# Patient Record
Sex: MALE | Race: WHITE | NOT HISPANIC OR LATINO | Employment: OTHER | URBAN - NONMETROPOLITAN AREA
[De-identification: names, ages, dates, MRNs, and addresses within clinical notes are randomized per-mention and may not be internally consistent; named-entity substitution may affect disease eponyms.]

---

## 2018-02-09 ENCOUNTER — HOSPITAL ENCOUNTER (EMERGENCY)
Facility: HOSPITAL | Age: 83
Discharge: HOME | End: 2018-02-09
Attending: FAMILY MEDICINE | Admitting: FAMILY MEDICINE

## 2018-02-09 VITALS
OXYGEN SATURATION: 99 % | BODY MASS INDEX: 21.22 KG/M2 | RESPIRATION RATE: 18 BRPM | TEMPERATURE: 98.2 F | HEART RATE: 93 BPM | WEIGHT: 140 LBS | SYSTOLIC BLOOD PRESSURE: 172 MMHG | HEIGHT: 68 IN | DIASTOLIC BLOOD PRESSURE: 89 MMHG

## 2018-02-09 DIAGNOSIS — I10 ESSENTIAL HYPERTENSION: Primary | ICD-10-CM

## 2018-02-09 DIAGNOSIS — Z91.14 NONCOMPLIANCE WITH MEDICATIONS: ICD-10-CM

## 2018-02-09 LAB
ALBUMIN SERPL-MCNC: 4.4 G/DL (ref 3.4–4.8)
ALBUMIN/GLOB SERPL: 1.4 G/DL (ref 1.5–2.5)
ALP SERPL-CCNC: 57 U/L (ref 40–129)
ALT SERPL W P-5'-P-CCNC: 21 U/L (ref 10–44)
ANION GAP SERPL CALCULATED.3IONS-SCNC: 9 MMOL/L (ref 3.6–11.2)
AST SERPL-CCNC: 25 U/L (ref 10–34)
BASOPHILS # BLD AUTO: 0.04 10*3/MM3 (ref 0–0.3)
BASOPHILS NFR BLD AUTO: 0.5 % (ref 0–2)
BILIRUB SERPL-MCNC: 0.9 MG/DL (ref 0.2–1.8)
BUN BLD-MCNC: 10 MG/DL (ref 7–21)
BUN/CREAT SERPL: 8.3 (ref 7–25)
CALCIUM SPEC-SCNC: 9.2 MG/DL (ref 7.7–10)
CHLORIDE SERPL-SCNC: 108 MMOL/L (ref 99–112)
CO2 SERPL-SCNC: 23 MMOL/L (ref 24.3–31.9)
CREAT BLD-MCNC: 1.21 MG/DL (ref 0.43–1.29)
DEPRECATED RDW RBC AUTO: 54.9 FL (ref 37–54)
EOSINOPHIL # BLD AUTO: 0.07 10*3/MM3 (ref 0–0.7)
EOSINOPHIL NFR BLD AUTO: 0.8 % (ref 0–7)
ERYTHROCYTE [DISTWIDTH] IN BLOOD BY AUTOMATED COUNT: 18.7 % (ref 11.5–14.5)
GFR SERPL CREATININE-BSD FRML MDRD: 57 ML/MIN/1.73
GLOBULIN UR ELPH-MCNC: 3.1 GM/DL
GLUCOSE BLD-MCNC: 138 MG/DL (ref 70–110)
HCT VFR BLD AUTO: 54.5 % (ref 42–52)
HGB BLD-MCNC: 18.1 G/DL (ref 14–18)
IMM GRANULOCYTES # BLD: 0.02 10*3/MM3 (ref 0–0.03)
IMM GRANULOCYTES NFR BLD: 0.2 % (ref 0–0.5)
LYMPHOCYTES # BLD AUTO: 3.06 10*3/MM3 (ref 1–3)
LYMPHOCYTES NFR BLD AUTO: 36.6 % (ref 16–46)
MCH RBC QN AUTO: 28.7 PG (ref 27–33)
MCHC RBC AUTO-ENTMCNC: 33.2 G/DL (ref 33–37)
MCV RBC AUTO: 86.4 FL (ref 80–94)
MONOCYTES # BLD AUTO: 0.84 10*3/MM3 (ref 0.1–0.9)
MONOCYTES NFR BLD AUTO: 10 % (ref 0–12)
NEUTROPHILS # BLD AUTO: 4.34 10*3/MM3 (ref 1.4–6.5)
NEUTROPHILS NFR BLD AUTO: 51.9 % (ref 40–75)
OSMOLALITY SERPL CALC.SUM OF ELEC: 280.6 MOSM/KG (ref 273–305)
PLATELET # BLD AUTO: 311 10*3/MM3 (ref 130–400)
PMV BLD AUTO: 10 FL (ref 6–10)
POTASSIUM BLD-SCNC: 3.5 MMOL/L (ref 3.5–5.3)
PROT SERPL-MCNC: 7.5 G/DL (ref 6–8)
RBC # BLD AUTO: 6.31 10*6/MM3 (ref 4.7–6.1)
SODIUM BLD-SCNC: 140 MMOL/L (ref 135–153)
WBC NRBC COR # BLD: 8.37 10*3/MM3 (ref 4.5–12.5)

## 2018-02-09 PROCEDURE — 99283 EMERGENCY DEPT VISIT LOW MDM: CPT

## 2018-02-09 PROCEDURE — 25010000002 HYDRALAZINE PER 20 MG: Performed by: FAMILY MEDICINE

## 2018-02-09 PROCEDURE — 80053 COMPREHEN METABOLIC PANEL: CPT | Performed by: FAMILY MEDICINE

## 2018-02-09 PROCEDURE — 85025 COMPLETE CBC W/AUTO DIFF WBC: CPT | Performed by: FAMILY MEDICINE

## 2018-02-09 PROCEDURE — 96374 THER/PROPH/DIAG INJ IV PUSH: CPT

## 2018-02-09 PROCEDURE — 93010 ELECTROCARDIOGRAM REPORT: CPT | Performed by: INTERNAL MEDICINE

## 2018-02-09 PROCEDURE — 36415 COLL VENOUS BLD VENIPUNCTURE: CPT

## 2018-02-09 PROCEDURE — 93005 ELECTROCARDIOGRAM TRACING: CPT | Performed by: FAMILY MEDICINE

## 2018-02-09 RX ORDER — CLONIDINE HYDROCHLORIDE 0.3 MG/1
0.3 TABLET ORAL 2 TIMES DAILY
Status: ON HOLD | COMMUNITY
End: 2019-11-10

## 2018-02-09 RX ORDER — TRIAMCINOLONE ACETONIDE 1 MG/G
CREAM TOPICAL 2 TIMES DAILY
Status: ON HOLD | COMMUNITY
End: 2019-11-10

## 2018-02-09 RX ORDER — HYDRALAZINE HYDROCHLORIDE 20 MG/ML
20 INJECTION INTRAMUSCULAR; INTRAVENOUS ONCE
Status: COMPLETED | OUTPATIENT
Start: 2018-02-09 | End: 2018-02-09

## 2018-02-09 RX ORDER — ENALAPRIL MALEATE 20 MG/1
20 TABLET ORAL DAILY
Status: ON HOLD | COMMUNITY
End: 2019-11-10

## 2018-02-09 RX ORDER — SODIUM CHLORIDE 0.9 % (FLUSH) 0.9 %
10 SYRINGE (ML) INJECTION AS NEEDED
Status: DISCONTINUED | OUTPATIENT
Start: 2018-02-09 | End: 2018-02-09 | Stop reason: HOSPADM

## 2018-02-09 RX ORDER — CLONIDINE HYDROCHLORIDE 0.1 MG/1
0.3 TABLET ORAL ONCE
Status: DISCONTINUED | OUTPATIENT
Start: 2018-02-09 | End: 2018-02-09

## 2018-02-09 RX ORDER — ATENOLOL 50 MG/1
50 TABLET ORAL DAILY
Status: ON HOLD | COMMUNITY
End: 2019-11-10

## 2018-02-09 RX ORDER — AMLODIPINE BESYLATE 5 MG/1
10 TABLET ORAL ONCE
Status: COMPLETED | OUTPATIENT
Start: 2018-02-09 | End: 2018-02-09

## 2018-02-09 RX ADMIN — HYDRALAZINE HYDROCHLORIDE 20 MG: 20 INJECTION INTRAMUSCULAR; INTRAVENOUS at 14:47

## 2018-02-09 RX ADMIN — AMLODIPINE BESYLATE 10 MG: 5 TABLET ORAL at 15:53

## 2018-02-09 NOTE — ED PROVIDER NOTES
Subjective   History of Present Illness  83 y/o M here from PCP's office after he was discovered to have elevated BP. Pt has been nonadherent to medication as an outpt. Pt denies any CP, HA, or vision changes. Pt on multiple antihypertensives but it is unclear what pt's most effective regimen would be given his noncompliance. Unclear if pt has been taking all of his prescribed medications per son.   Review of Systems   Constitutional: Negative for chills, fatigue and fever.   Eyes: Negative for photophobia and visual disturbance.   Respiratory: Negative for cough, shortness of breath and wheezing.    Cardiovascular: Negative for chest pain and palpitations.        Elevated BP   Gastrointestinal: Negative for abdominal distention and abdominal pain.   Genitourinary: Negative for difficulty urinating and dysuria.   Musculoskeletal: Negative for back pain and neck pain.   Skin: Negative for color change and pallor.   Neurological: Negative for dizziness, facial asymmetry, speech difficulty and light-headedness.   Hematological: Does not bruise/bleed easily.   All other systems reviewed and are negative.      Past Medical History:   Diagnosis Date   • Abnormal CT of the abdomen 6/19/2016   • Colitis 6/19/2016   • History of transfusion    • Hyperlipidemia    • Hypertension    • Pancreatitis        Allergies   Allergen Reactions   • Penicillins        Past Surgical History:   Procedure Laterality Date   • COLONOSCOPY N/A 11/9/2016    Procedure: COLONOSCOPY CPT CODE: 26688;  Surgeon: Lilliam Wilson DO;  Location: [facility] OR;  Service:    • ENDOSCOPY N/A 6/17/2016    Procedure: ESOPHAGOGASTRODUODENOSCOPY;  Surgeon: Alex Robles III, MD;  Location: [facility] OR;  Service:    • ENDOSCOPY N/A 11/9/2016    Procedure: ESOPHAGOGASTRODUODENOSCOPY WITH BIOPSY CPT CODE: 74710;  Surgeon: Lilliam Wilson DO;  Location: [facility] OR;  Service:    • ESOPHAGEAL DILATATION         Family History   Problem Relation Age of Onset    • Stroke Mother    • Heart disease Brother        Social History     Social History   • Marital status:      Spouse name: N/A   • Number of children: N/A   • Years of education: N/A     Social History Main Topics   • Smoking status: Never Smoker   • Smokeless tobacco: Never Used   • Alcohol use No   • Drug use: No   • Sexual activity: Defer     Other Topics Concern   • None     Social History Narrative   • None           Objective   Physical Exam   Constitutional: He is oriented to person, place, and time. He appears well-developed and well-nourished. He is active.   HENT:   Head: Normocephalic and atraumatic.   Right Ear: Hearing, external ear and ear canal normal.   Left Ear: Hearing, external ear and ear canal normal.   Nose: Nose normal.   Mouth/Throat: Uvula is midline, oropharynx is clear and moist and mucous membranes are normal.   Eyes: Conjunctivae, EOM and lids are normal. Pupils are equal, round, and reactive to light.   Neck: Trachea normal, normal range of motion, full passive range of motion without pain and phonation normal. Neck supple.   Cardiovascular: Normal rate, regular rhythm and normal heart sounds.    Pulmonary/Chest: Effort normal and breath sounds normal.   Abdominal: Soft. Normal appearance.   Neurological: He is alert and oriented to person, place, and time. GCS eye subscore is 4. GCS verbal subscore is 5. GCS motor subscore is 6.   Skin: Skin is warm, dry and intact.   Psychiatric: He has a normal mood and affect. His speech is normal and behavior is normal. Cognition and memory are normal.   Nursing note and vitals reviewed.      Procedures         ED Course  ED Course   Value Comment By Time   ECG 12 Lead NSR, 86 bpm. QTc 423ms. No ST segment abnormalities concerning for STEMI. Akin Marte MD 02/09 1551      Pt asymptomatic from increased BP. Pt given dose hydralazine and norvasc. Pt instructed to restart norvasc, atenolol, and hydralazine only and return to his  PCP before restarting other medications. Unclear which medication regimen would be effective given pt's noncompliance with medication regimen. Pt instructed to return to ED if any symptoms concerning for MI or CVA.            MDM  Number of Diagnoses or Management Options  Essential hypertension: new and requires workup  Noncompliance with medications: new and requires workup     Amount and/or Complexity of Data Reviewed  Clinical lab tests: reviewed and ordered  Tests in the medicine section of CPT®: reviewed and ordered  Independent visualization of images, tracings, or specimens: yes    Risk of Complications, Morbidity, and/or Mortality  Presenting problems: high  Diagnostic procedures: high  Management options: high    Patient Progress  Patient progress: stable      Final diagnoses:   Essential hypertension   Noncompliance with medications            Akin Marte MD  02/09/18 [id number]

## 2018-11-23 ENCOUNTER — APPOINTMENT (OUTPATIENT)
Dept: LAB | Facility: HOSPITAL | Age: 83
End: 2018-11-23

## 2018-11-23 ENCOUNTER — TRANSCRIBE ORDERS (OUTPATIENT)
Dept: ADMINISTRATIVE | Facility: HOSPITAL | Age: 83
End: 2018-11-23

## 2018-11-23 DIAGNOSIS — D75.1 POLYCYTHEMIA: Primary | ICD-10-CM

## 2018-11-23 DIAGNOSIS — D72.820 PERSISTENT LYMPHOCYTOSIS: ICD-10-CM

## 2018-11-23 PROCEDURE — 85060 BLOOD SMEAR INTERPRETATION: CPT | Performed by: NURSE PRACTITIONER

## 2018-11-23 PROCEDURE — 36415 COLL VENOUS BLD VENIPUNCTURE: CPT | Performed by: NURSE PRACTITIONER

## 2018-11-23 PROCEDURE — 88185 FLOWCYTOMETRY/TC ADD-ON: CPT

## 2018-11-23 PROCEDURE — 88189 FLOWCYTOMETRY/READ 16 & >: CPT

## 2018-11-23 PROCEDURE — 88184 FLOWCYTOMETRY/ TC 1 MARKER: CPT

## 2018-11-27 LAB — REF LAB TEST METHOD: NORMAL

## 2018-11-28 LAB
CYTOLOGIST CVX/VAG CYTO: NORMAL
PATH INTERP BLD-IMP: NORMAL

## 2019-11-10 ENCOUNTER — APPOINTMENT (OUTPATIENT)
Dept: GENERAL RADIOLOGY | Facility: HOSPITAL | Age: 84
End: 2019-11-10

## 2019-11-10 ENCOUNTER — APPOINTMENT (OUTPATIENT)
Dept: ULTRASOUND IMAGING | Facility: HOSPITAL | Age: 84
End: 2019-11-10

## 2019-11-10 ENCOUNTER — HOSPITAL ENCOUNTER (INPATIENT)
Facility: HOSPITAL | Age: 84
LOS: 3 days | Discharge: NURSING, REHAB OR LONG-TERM CARE | End: 2019-11-13
Attending: FAMILY MEDICINE | Admitting: HOSPITALIST

## 2019-11-10 ENCOUNTER — APPOINTMENT (OUTPATIENT)
Dept: CT IMAGING | Facility: HOSPITAL | Age: 84
End: 2019-11-10

## 2019-11-10 DIAGNOSIS — R07.9 CHEST PAIN, UNSPECIFIED TYPE: ICD-10-CM

## 2019-11-10 DIAGNOSIS — E87.20 LACTIC ACIDOSIS: ICD-10-CM

## 2019-11-10 DIAGNOSIS — R10.9 ABDOMINAL PAIN, UNSPECIFIED ABDOMINAL LOCATION: Primary | ICD-10-CM

## 2019-11-10 DIAGNOSIS — R77.8 ELEVATED TROPONIN: ICD-10-CM

## 2019-11-10 DIAGNOSIS — R31.0 GROSS HEMATURIA: ICD-10-CM

## 2019-11-10 LAB
ALBUMIN SERPL-MCNC: 3.97 G/DL (ref 3.5–5.2)
ALBUMIN/GLOB SERPL: 1 G/DL
ALP SERPL-CCNC: 70 U/L (ref 39–117)
ALT SERPL W P-5'-P-CCNC: 22 U/L (ref 1–41)
ANION GAP SERPL CALCULATED.3IONS-SCNC: 16 MMOL/L (ref 5–15)
APTT PPP: 25.5 SECONDS (ref 23.8–36.1)
AST SERPL-CCNC: 32 U/L (ref 1–40)
BACTERIA UR QL AUTO: ABNORMAL /HPF
BASOPHILS # BLD AUTO: 0.05 10*3/MM3 (ref 0–0.2)
BASOPHILS NFR BLD AUTO: 0.8 % (ref 0–1.5)
BILIRUB SERPL-MCNC: 0.7 MG/DL (ref 0.2–1.2)
BILIRUB UR QL STRIP: NEGATIVE
BUN BLD-MCNC: 12 MG/DL (ref 8–23)
BUN/CREAT SERPL: 9.1 (ref 7–25)
CALCIUM SPEC-SCNC: 9.7 MG/DL (ref 8.6–10.5)
CHLORIDE SERPL-SCNC: 104 MMOL/L (ref 98–107)
CK SERPL-CCNC: 144 U/L (ref 20–200)
CLARITY UR: CLEAR
CO2 SERPL-SCNC: 22 MMOL/L (ref 22–29)
COLOR UR: YELLOW
CREAT BLD-MCNC: 1.32 MG/DL (ref 0.76–1.27)
D-LACTATE SERPL-SCNC: 2.1 MMOL/L (ref 0.5–2)
D-LACTATE SERPL-SCNC: 3.6 MMOL/L (ref 0.5–2)
DEPRECATED RDW RBC AUTO: 47.8 FL (ref 37–54)
EOSINOPHIL # BLD AUTO: 0.05 10*3/MM3 (ref 0–0.4)
EOSINOPHIL NFR BLD AUTO: 0.8 % (ref 0.3–6.2)
ERYTHROCYTE [DISTWIDTH] IN BLOOD BY AUTOMATED COUNT: 14.4 % (ref 12.3–15.4)
GFR SERPL CREATININE-BSD FRML MDRD: 51 ML/MIN/1.73
GLOBULIN UR ELPH-MCNC: 3.8 GM/DL
GLUCOSE BLD-MCNC: 131 MG/DL (ref 65–99)
GLUCOSE UR STRIP-MCNC: NEGATIVE MG/DL
HCT VFR BLD AUTO: 49.9 % (ref 37.5–51)
HGB BLD-MCNC: 17 G/DL (ref 13–17.7)
HGB UR QL STRIP.AUTO: ABNORMAL
HOLD SPECIMEN: NORMAL
HYALINE CASTS UR QL AUTO: ABNORMAL /LPF
IMM GRANULOCYTES # BLD AUTO: 0.02 10*3/MM3 (ref 0–0.05)
IMM GRANULOCYTES NFR BLD AUTO: 0.3 % (ref 0–0.5)
INR PPP: 0.92 (ref 0.9–1.1)
KETONES UR QL STRIP: NEGATIVE
LEUKOCYTE ESTERASE UR QL STRIP.AUTO: NEGATIVE
LYMPHOCYTES # BLD AUTO: 1.86 10*3/MM3 (ref 0.7–3.1)
LYMPHOCYTES NFR BLD AUTO: 30.3 % (ref 19.6–45.3)
MAGNESIUM SERPL-MCNC: 2 MG/DL (ref 1.6–2.4)
MCH RBC QN AUTO: 30.7 PG (ref 26.6–33)
MCHC RBC AUTO-ENTMCNC: 34.1 G/DL (ref 31.5–35.7)
MCV RBC AUTO: 90.1 FL (ref 79–97)
MONOCYTES # BLD AUTO: 0.72 10*3/MM3 (ref 0.1–0.9)
MONOCYTES NFR BLD AUTO: 11.7 % (ref 5–12)
NEUTROPHILS # BLD AUTO: 3.43 10*3/MM3 (ref 1.7–7)
NEUTROPHILS NFR BLD AUTO: 56.1 % (ref 42.7–76)
NITRITE UR QL STRIP: NEGATIVE
NRBC BLD AUTO-RTO: 0 /100 WBC (ref 0–0.2)
NT-PROBNP SERPL-MCNC: 6150 PG/ML (ref 5–1800)
PH UR STRIP.AUTO: 7.5 [PH] (ref 5–8)
PLATELET # BLD AUTO: 290 10*3/MM3 (ref 140–450)
PMV BLD AUTO: 11.2 FL (ref 6–12)
POTASSIUM BLD-SCNC: 3.3 MMOL/L (ref 3.5–5.2)
PROT SERPL-MCNC: 7.8 G/DL (ref 6–8.5)
PROT UR QL STRIP: ABNORMAL
PROTHROMBIN TIME: 12.8 SECONDS (ref 11–15.4)
RBC # BLD AUTO: 5.54 10*6/MM3 (ref 4.14–5.8)
RBC # UR: ABNORMAL /HPF
REF LAB TEST METHOD: ABNORMAL
SODIUM BLD-SCNC: 142 MMOL/L (ref 136–145)
SP GR UR STRIP: 1.02 (ref 1–1.03)
SQUAMOUS #/AREA URNS HPF: ABNORMAL /HPF
TROPONIN T SERPL-MCNC: 0.04 NG/ML (ref 0–0.03)
TROPONIN T SERPL-MCNC: 0.04 NG/ML (ref 0–0.03)
TSH SERPL DL<=0.05 MIU/L-ACNC: 5.23 UIU/ML (ref 0.27–4.2)
UROBILINOGEN UR QL STRIP: ABNORMAL
WBC NRBC COR # BLD: 6.13 10*3/MM3 (ref 3.4–10.8)
WBC UR QL AUTO: ABNORMAL /HPF

## 2019-11-10 PROCEDURE — 99223 1ST HOSP IP/OBS HIGH 75: CPT | Performed by: INTERNAL MEDICINE

## 2019-11-10 PROCEDURE — 99222 1ST HOSP IP/OBS MODERATE 55: CPT | Performed by: INTERNAL MEDICINE

## 2019-11-10 PROCEDURE — 85610 PROTHROMBIN TIME: CPT | Performed by: FAMILY MEDICINE

## 2019-11-10 PROCEDURE — 71045 X-RAY EXAM CHEST 1 VIEW: CPT

## 2019-11-10 PROCEDURE — 36415 COLL VENOUS BLD VENIPUNCTURE: CPT

## 2019-11-10 PROCEDURE — 85025 COMPLETE CBC W/AUTO DIFF WBC: CPT | Performed by: FAMILY MEDICINE

## 2019-11-10 PROCEDURE — P9612 CATHETERIZE FOR URINE SPEC: HCPCS

## 2019-11-10 PROCEDURE — 93010 ELECTROCARDIOGRAM REPORT: CPT | Performed by: INTERNAL MEDICINE

## 2019-11-10 PROCEDURE — 80053 COMPREHEN METABOLIC PANEL: CPT | Performed by: FAMILY MEDICINE

## 2019-11-10 PROCEDURE — 93005 ELECTROCARDIOGRAM TRACING: CPT | Performed by: FAMILY MEDICINE

## 2019-11-10 PROCEDURE — 74176 CT ABD & PELVIS W/O CONTRAST: CPT

## 2019-11-10 PROCEDURE — 94799 UNLISTED PULMONARY SVC/PX: CPT

## 2019-11-10 PROCEDURE — 93005 ELECTROCARDIOGRAM TRACING: CPT | Performed by: INTERNAL MEDICINE

## 2019-11-10 PROCEDURE — 83735 ASSAY OF MAGNESIUM: CPT | Performed by: FAMILY MEDICINE

## 2019-11-10 PROCEDURE — 84484 ASSAY OF TROPONIN QUANT: CPT | Performed by: FAMILY MEDICINE

## 2019-11-10 PROCEDURE — 81001 URINALYSIS AUTO W/SCOPE: CPT | Performed by: FAMILY MEDICINE

## 2019-11-10 PROCEDURE — 85730 THROMBOPLASTIN TIME PARTIAL: CPT | Performed by: FAMILY MEDICINE

## 2019-11-10 PROCEDURE — 83880 ASSAY OF NATRIURETIC PEPTIDE: CPT | Performed by: FAMILY MEDICINE

## 2019-11-10 PROCEDURE — 25010000002 HYDROMORPHONE PER 4 MG: Performed by: FAMILY MEDICINE

## 2019-11-10 PROCEDURE — 76870 US EXAM SCROTUM: CPT

## 2019-11-10 PROCEDURE — 99285 EMERGENCY DEPT VISIT HI MDM: CPT

## 2019-11-10 PROCEDURE — 83605 ASSAY OF LACTIC ACID: CPT | Performed by: FAMILY MEDICINE

## 2019-11-10 PROCEDURE — 84443 ASSAY THYROID STIM HORMONE: CPT | Performed by: FAMILY MEDICINE

## 2019-11-10 PROCEDURE — 87040 BLOOD CULTURE FOR BACTERIA: CPT | Performed by: FAMILY MEDICINE

## 2019-11-10 PROCEDURE — 82550 ASSAY OF CK (CPK): CPT | Performed by: FAMILY MEDICINE

## 2019-11-10 RX ORDER — PANTOPRAZOLE SODIUM 40 MG/1
40 TABLET, DELAYED RELEASE ORAL NIGHTLY
Status: CANCELLED | OUTPATIENT
Start: 2019-11-11

## 2019-11-10 RX ORDER — SODIUM CHLORIDE 0.9 % (FLUSH) 0.9 %
10 SYRINGE (ML) INJECTION AS NEEDED
Status: DISCONTINUED | OUTPATIENT
Start: 2019-11-10 | End: 2019-11-13 | Stop reason: HOSPADM

## 2019-11-10 RX ORDER — PANTOPRAZOLE SODIUM 40 MG/1
40 TABLET, DELAYED RELEASE ORAL NIGHTLY
COMMUNITY

## 2019-11-10 RX ORDER — HYDROMORPHONE HYDROCHLORIDE 1 MG/ML
0.5 INJECTION, SOLUTION INTRAMUSCULAR; INTRAVENOUS; SUBCUTANEOUS ONCE
Status: COMPLETED | OUTPATIENT
Start: 2019-11-10 | End: 2019-11-10

## 2019-11-10 RX ORDER — ATORVASTATIN CALCIUM 40 MG/1
40 TABLET, FILM COATED ORAL DAILY
Status: CANCELLED | OUTPATIENT
Start: 2019-11-11

## 2019-11-10 RX ORDER — ATORVASTATIN CALCIUM 40 MG/1
40 TABLET, FILM COATED ORAL DAILY
COMMUNITY
End: 2020-09-11

## 2019-11-10 RX ORDER — NITROGLYCERIN 0.4 MG/1
0.4 TABLET SUBLINGUAL
Status: DISCONTINUED | OUTPATIENT
Start: 2019-11-10 | End: 2019-11-13 | Stop reason: HOSPADM

## 2019-11-10 RX ORDER — CLOPIDOGREL BISULFATE 75 MG/1
75 TABLET ORAL DAILY
COMMUNITY

## 2019-11-10 RX ORDER — THIAMINE MONONITRATE (VIT B1) 100 MG
100 TABLET ORAL DAILY
COMMUNITY
End: 2020-08-07 | Stop reason: ALTCHOICE

## 2019-11-10 RX ORDER — SODIUM CHLORIDE 0.9 % (FLUSH) 0.9 %
10 SYRINGE (ML) INJECTION EVERY 12 HOURS SCHEDULED
Status: DISCONTINUED | OUTPATIENT
Start: 2019-11-11 | End: 2019-11-13 | Stop reason: HOSPADM

## 2019-11-10 RX ORDER — HYDROXYZINE HYDROCHLORIDE 25 MG/1
25 TABLET, FILM COATED ORAL NIGHTLY
Status: CANCELLED | OUTPATIENT
Start: 2019-11-11

## 2019-11-10 RX ORDER — ASPIRIN 81 MG/1
81 TABLET ORAL DAILY
Status: CANCELLED | OUTPATIENT
Start: 2019-11-11

## 2019-11-10 RX ORDER — HEPARIN SODIUM 5000 [USP'U]/ML
5000 INJECTION, SOLUTION INTRAVENOUS; SUBCUTANEOUS EVERY 12 HOURS SCHEDULED
Status: DISCONTINUED | OUTPATIENT
Start: 2019-11-11 | End: 2019-11-13 | Stop reason: HOSPADM

## 2019-11-10 RX ORDER — THIAMINE MONONITRATE (VIT B1) 100 MG
100 TABLET ORAL DAILY
Status: CANCELLED | OUTPATIENT
Start: 2019-11-11

## 2019-11-10 RX ORDER — HYDROXYZINE HYDROCHLORIDE 25 MG/1
25 TABLET, FILM COATED ORAL NIGHTLY
COMMUNITY

## 2019-11-10 RX ORDER — ASPIRIN 325 MG
325 TABLET ORAL ONCE
Status: COMPLETED | OUTPATIENT
Start: 2019-11-10 | End: 2019-11-10

## 2019-11-10 RX ORDER — CLOPIDOGREL BISULFATE 75 MG/1
75 TABLET ORAL DAILY
Status: CANCELLED | OUTPATIENT
Start: 2019-11-11

## 2019-11-10 RX ORDER — ASPIRIN 81 MG/1
81 TABLET ORAL DAILY
COMMUNITY

## 2019-11-10 RX ADMIN — ASPIRIN 325 MG: 325 TABLET ORAL at 18:22

## 2019-11-10 RX ADMIN — HYDROMORPHONE HYDROCHLORIDE 0.5 MG: 1 INJECTION, SOLUTION INTRAMUSCULAR; INTRAVENOUS; SUBCUTANEOUS at 19:55

## 2019-11-10 RX ADMIN — HYDROMORPHONE HYDROCHLORIDE 0.5 MG: 1 INJECTION, SOLUTION INTRAMUSCULAR; INTRAVENOUS; SUBCUTANEOUS at 18:28

## 2019-11-10 RX ADMIN — SODIUM CHLORIDE, POTASSIUM CHLORIDE, SODIUM LACTATE AND CALCIUM CHLORIDE 1000 ML: 600; 310; 30; 20 INJECTION, SOLUTION INTRAVENOUS at 19:44

## 2019-11-10 RX ADMIN — METOPROLOL TARTRATE 25 MG: 25 TABLET, FILM COATED ORAL at 20:57

## 2019-11-10 NOTE — ED PROVIDER NOTES
Subjective   Patient is an 86-year-old male presents the emergency department from the nursing home for groin pain unspecified laterality and lower abdominal pain.  The patient is a very poor historian he seems to be unable to communicate his symptoms.  He is able to tell me that his groin is very sore and when I examined the area he is got a large lump above his left pubic bone.  The patient states that this is the area that is hurting him and is been there for a few days.  He has not had any nausea, vomiting or diarrhea.  He also mentions that he has some chest pain.  He is unable to tell me how long he has been having chest pain.  The patient reportedly has not had any fever, chills, diarrhea or any additional symptoms at this time.  It is unsure how long this abnormality in his abdomen has been there.  The patient denies any history of hernia to his knowledge.  No other symptoms are reported by the nursing home or from the patient.            Review of Systems   Constitutional: Negative for activity change, appetite change, chills, diaphoresis, fatigue and fever.   HENT: Negative for congestion, postnasal drip, rhinorrhea, sinus pressure, sinus pain, sneezing and sore throat.    Eyes: Negative for photophobia, pain, discharge, redness and itching.   Respiratory: Negative for apnea, cough, choking, chest tightness and shortness of breath.    Cardiovascular: Positive for chest pain. Negative for palpitations and leg swelling.   Gastrointestinal: Positive for abdominal pain. Negative for abdominal distention, constipation, diarrhea, nausea and vomiting.   Endocrine: Negative for cold intolerance and heat intolerance.   Genitourinary: Positive for testicular pain. Negative for difficulty urinating and dysuria.   Musculoskeletal: Negative for arthralgias and back pain.   Skin: Negative for color change and pallor.   Neurological: Negative for dizziness, facial asymmetry and headaches.   Psychiatric/Behavioral:  Negative for agitation, behavioral problems and confusion.       Past Medical History:   Diagnosis Date   • Abnormal CT of the abdomen 6/19/2016   • Colitis 6/19/2016   • History of transfusion    • Hyperlipidemia    • Hypertension    • Pancreatitis        Allergies   Allergen Reactions   • Penicillins        Past Surgical History:   Procedure Laterality Date   • COLONOSCOPY N/A 11/9/2016    Procedure: COLONOSCOPY CPT CODE: 34151;  Surgeon: Lilliam Wilson DO;  Location: [facility] OR;  Service:    • ENDOSCOPY N/A 6/17/2016    Procedure: ESOPHAGOGASTRODUODENOSCOPY;  Surgeon: Alex Robles III, MD;  Location: [facility] OR;  Service:    • ENDOSCOPY N/A 11/9/2016    Procedure: ESOPHAGOGASTRODUODENOSCOPY WITH BIOPSY CPT CODE: 61950;  Surgeon: Lilliam Wilson DO;  Location: [facility] OR;  Service:    • ESOPHAGEAL DILATATION         Family History   Problem Relation Age of Onset   • Stroke Mother    • Heart disease Brother        Social History     Socioeconomic History   • Marital status:      Spouse name: Not on file   • Number of children: Not on file   • Years of education: Not on file   • Highest education level: Not on file   Tobacco Use   • Smoking status: Never Smoker   • Smokeless tobacco: Never Used   Substance and Sexual Activity   • Alcohol use: No   • Drug use: No   • Sexual activity: Defer           Objective   Physical Exam   Constitutional: He appears well-developed and well-nourished.  Non-toxic appearance. He does not appear ill. No distress.   Elderly and frail in appearance   HENT:   Head: Normocephalic and atraumatic.   Eyes: EOM are normal. Pupils are equal, round, and reactive to light.   Neck: Normal range of motion. Neck supple. No hepatojugular reflux and no JVD present. No tracheal deviation present. No thyromegaly present.   Cardiovascular: Normal rate, regular rhythm, intact distal pulses and normal pulses.  No extrasystoles are present. PMI is not displaced. Exam reveals no gallop,  no S3, no S4, no distant heart sounds and no friction rub.   No murmur heard.   No systolic murmur is present.   No diastolic murmur is present.  Pulmonary/Chest: Effort normal and breath sounds normal. No accessory muscle usage or stridor. No tachypnea. No respiratory distress. He has no decreased breath sounds. He has no wheezes. He has no rhonchi. He has no rales.   Abdominal: Soft. He exhibits no distension, no ascites and no mass. Bowel sounds are decreased. There is no splenomegaly or hepatomegaly. There is no tenderness. There is no rebound and no guarding.   There is a red, swollen area above the patient's left pubic bone.  It is very tender to palpation.  It appears to be a nonreducible hernia.   Musculoskeletal:        Right lower leg: Normal. He exhibits no tenderness and no edema.        Left lower leg: Normal. He exhibits no tenderness and no edema.   Lymphadenopathy:     He has no cervical adenopathy.   Neurological: He is alert.   Skin: Skin is warm and dry. Capillary refill takes less than 2 seconds.   Psychiatric: He has a normal mood and affect. His behavior is normal. His mood appears not anxious. He is not agitated.   Nursing note and vitals reviewed.      Procedures           ED Course  ED Course as of Nov 10 2030   Sun Nov 10, 2019   1824 I spoke with the cardiologist on-call, Dr. Alexander, who advises doing a repeat EKG as there seems to be missed place lead in V2.  We will give the patient an aspirin and repeat the EKG.  Dr. Alarcon look at the repeat EKG and we will discuss plan of action from there.  Patient at this time has no chest pain, and states that he feels fine, he has no complaints and vital signs are stable.  [EG]   1913 Repeat EKG discussed with Dr. Alexander.  At this point the plan is to treat medically and conservatively/  [EG]   1915 Discussed CT findings with Dr. Leal who agrees to consult on this patient.  [EG]      ED Course User Index  [EG] Selina Boyer DO                   MDM  Number of Diagnoses or Management Options  Abdominal pain, unspecified abdominal location: new and requires workup  Chest pain, unspecified type: new and requires workup  Elevated troponin: new and requires workup  Lactic acidosis: new and requires workup     Amount and/or Complexity of Data Reviewed  Clinical lab tests: ordered and reviewed  Tests in the radiology section of CPT®: ordered and reviewed  Tests in the medicine section of CPT®: ordered and reviewed  Decide to obtain previous medical records or to obtain history from someone other than the patient: yes  Discuss the patient with other providers: yes  Independent visualization of images, tracings, or specimens: yes    Risk of Complications, Morbidity, and/or Mortality  Presenting problems: high  Diagnostic procedures: high    Critical Care  Total time providing critical care: < 30 minutes    Patient Progress  Patient progress: stable      Final diagnoses:   Abdominal pain, unspecified abdominal location   Chest pain, unspecified type   Lactic acidosis   Elevated troponin              Selina Boyer DO  11/10/19 2030

## 2019-11-11 ENCOUNTER — APPOINTMENT (OUTPATIENT)
Dept: CARDIOLOGY | Facility: HOSPITAL | Age: 84
End: 2019-11-11

## 2019-11-11 ENCOUNTER — APPOINTMENT (OUTPATIENT)
Dept: GENERAL RADIOLOGY | Facility: HOSPITAL | Age: 84
End: 2019-11-11

## 2019-11-11 LAB
ALBUMIN SERPL-MCNC: 3.06 G/DL (ref 3.5–5.2)
ALBUMIN/GLOB SERPL: 1 G/DL
ALP SERPL-CCNC: 53 U/L (ref 39–117)
ALT SERPL W P-5'-P-CCNC: 16 U/L (ref 1–41)
ANION GAP SERPL CALCULATED.3IONS-SCNC: 11.3 MMOL/L (ref 5–15)
AST SERPL-CCNC: 24 U/L (ref 1–40)
BASOPHILS # BLD AUTO: 0.05 10*3/MM3 (ref 0–0.2)
BASOPHILS NFR BLD AUTO: 0.6 % (ref 0–1.5)
BH CV ECHO MEAS - AO MAX PG: 4.1 MMHG
BH CV ECHO MEAS - AO MEAN PG: 2.1 MMHG
BH CV ECHO MEAS - AO V2 MAX: 101.8 CM/SEC
BH CV ECHO MEAS - AO V2 MEAN: 66.7 CM/SEC
BH CV ECHO MEAS - AO V2 VTI: 23.5 CM
BH CV ECHO MEAS - BSA(HAYCOCK): 1.6 M^2
BH CV ECHO MEAS - BSA: 1.7 M^2
BH CV ECHO MEAS - BZI_BMI: 19.6 KILOGRAMS/M^2
BH CV ECHO MEAS - BZI_METRIC_HEIGHT: 170.2 CM
BH CV ECHO MEAS - BZI_METRIC_WEIGHT: 56.7 KG
BH CV ECHO MEAS - EDV(MOD-SP4): 69 ML
BH CV ECHO MEAS - EF(MOD-SP4): 63.8 %
BH CV ECHO MEAS - ESV(MOD-SP4): 25 ML
BH CV ECHO MEAS - LV DIASTOLIC VOL/BSA (35-75): 41.7 ML/M^2
BH CV ECHO MEAS - LV SYSTOLIC VOL/BSA (12-30): 15.1 ML/M^2
BH CV ECHO MEAS - LVLD AP4: 7.6 CM
BH CV ECHO MEAS - LVLS AP4: 7.2 CM
BH CV ECHO MEAS - MV A MAX VEL: 107.1 CM/SEC
BH CV ECHO MEAS - MV E MAX VEL: 52.8 CM/SEC
BH CV ECHO MEAS - MV E/A: 0.49
BH CV ECHO MEAS - RAP SYSTOLE: 10 MMHG
BH CV ECHO MEAS - RVSP: 35.2 MMHG
BH CV ECHO MEAS - SI(MOD-SP4): 26.6 ML/M^2
BH CV ECHO MEAS - SV(MOD-SP4): 44 ML
BH CV ECHO MEAS - TR MAX VEL: 250.8 CM/SEC
BILIRUB SERPL-MCNC: 0.7 MG/DL (ref 0.2–1.2)
BUN BLD-MCNC: 10 MG/DL (ref 8–23)
BUN/CREAT SERPL: 8.6 (ref 7–25)
CALCIUM SPEC-SCNC: 8.7 MG/DL (ref 8.6–10.5)
CHLORIDE SERPL-SCNC: 108 MMOL/L (ref 98–107)
CHOLEST SERPL-MCNC: 90 MG/DL (ref 0–200)
CO2 SERPL-SCNC: 24.7 MMOL/L (ref 22–29)
CREAT BLD-MCNC: 1.16 MG/DL (ref 0.76–1.27)
D-LACTATE SERPL-SCNC: 2.3 MMOL/L (ref 0.5–2)
D-LACTATE SERPL-SCNC: 2.5 MMOL/L (ref 0.5–2)
D-LACTATE SERPL-SCNC: 2.5 MMOL/L (ref 0.5–2)
DEPRECATED RDW RBC AUTO: 49.7 FL (ref 37–54)
EOSINOPHIL # BLD AUTO: 0.11 10*3/MM3 (ref 0–0.4)
EOSINOPHIL NFR BLD AUTO: 1.3 % (ref 0.3–6.2)
ERYTHROCYTE [DISTWIDTH] IN BLOOD BY AUTOMATED COUNT: 14.6 % (ref 12.3–15.4)
GFR SERPL CREATININE-BSD FRML MDRD: 60 ML/MIN/1.73
GLOBULIN UR ELPH-MCNC: 2.9 GM/DL
GLUCOSE BLD-MCNC: 97 MG/DL (ref 65–99)
HCT VFR BLD AUTO: 44.1 % (ref 37.5–51)
HDLC SERPL-MCNC: 33 MG/DL (ref 40–60)
HGB BLD-MCNC: 14.8 G/DL (ref 13–17.7)
IMM GRANULOCYTES # BLD AUTO: 0.02 10*3/MM3 (ref 0–0.05)
IMM GRANULOCYTES NFR BLD AUTO: 0.2 % (ref 0–0.5)
LDLC SERPL CALC-MCNC: 45 MG/DL (ref 0–100)
LDLC/HDLC SERPL: 1.35 {RATIO}
LIPASE SERPL-CCNC: 252 U/L (ref 13–60)
LYMPHOCYTES # BLD AUTO: 3.05 10*3/MM3 (ref 0.7–3.1)
LYMPHOCYTES NFR BLD AUTO: 37 % (ref 19.6–45.3)
MCH RBC QN AUTO: 31 PG (ref 26.6–33)
MCHC RBC AUTO-ENTMCNC: 33.6 G/DL (ref 31.5–35.7)
MCV RBC AUTO: 92.5 FL (ref 79–97)
MONOCYTES # BLD AUTO: 1.03 10*3/MM3 (ref 0.1–0.9)
MONOCYTES NFR BLD AUTO: 12.5 % (ref 5–12)
NEUTROPHILS # BLD AUTO: 3.98 10*3/MM3 (ref 1.7–7)
NEUTROPHILS NFR BLD AUTO: 48.4 % (ref 42.7–76)
NRBC BLD AUTO-RTO: 0 /100 WBC (ref 0–0.2)
PLATELET # BLD AUTO: 231 10*3/MM3 (ref 140–450)
PMV BLD AUTO: 10.9 FL (ref 6–12)
POTASSIUM BLD-SCNC: 3.3 MMOL/L (ref 3.5–5.2)
POTASSIUM BLD-SCNC: 3.5 MMOL/L (ref 3.5–5.2)
PROT SERPL-MCNC: 6 G/DL (ref 6–8.5)
RBC # BLD AUTO: 4.77 10*6/MM3 (ref 4.14–5.8)
SODIUM BLD-SCNC: 144 MMOL/L (ref 136–145)
T4 FREE SERPL-MCNC: 0.98 NG/DL (ref 0.93–1.7)
TRIGL SERPL-MCNC: 62 MG/DL (ref 0–150)
TROPONIN T SERPL-MCNC: 0.04 NG/ML (ref 0–0.03)
TROPONIN T SERPL-MCNC: 0.05 NG/ML (ref 0–0.03)
TROPONIN T SERPL-MCNC: 0.06 NG/ML (ref 0–0.03)
VLDLC SERPL-MCNC: 12.4 MG/DL
WBC NRBC COR # BLD: 8.24 10*3/MM3 (ref 3.4–10.8)

## 2019-11-11 PROCEDURE — 84439 ASSAY OF FREE THYROXINE: CPT | Performed by: INTERNAL MEDICINE

## 2019-11-11 PROCEDURE — 80061 LIPID PANEL: CPT | Performed by: HOSPITALIST

## 2019-11-11 PROCEDURE — 93010 ELECTROCARDIOGRAM REPORT: CPT | Performed by: INTERNAL MEDICINE

## 2019-11-11 PROCEDURE — 83605 ASSAY OF LACTIC ACID: CPT | Performed by: HOSPITALIST

## 2019-11-11 PROCEDURE — 74018 RADEX ABDOMEN 1 VIEW: CPT | Performed by: RADIOLOGY

## 2019-11-11 PROCEDURE — 99231 SBSQ HOSP IP/OBS SF/LOW 25: CPT | Performed by: NURSE PRACTITIONER

## 2019-11-11 PROCEDURE — 85025 COMPLETE CBC W/AUTO DIFF WBC: CPT | Performed by: INTERNAL MEDICINE

## 2019-11-11 PROCEDURE — 25010000003 POTASSIUM CHLORIDE 10 MEQ/100ML SOLUTION: Performed by: HOSPITALIST

## 2019-11-11 PROCEDURE — 99232 SBSQ HOSP IP/OBS MODERATE 35: CPT | Performed by: HOSPITALIST

## 2019-11-11 PROCEDURE — 83690 ASSAY OF LIPASE: CPT | Performed by: HOSPITALIST

## 2019-11-11 PROCEDURE — 25010000002 HEPARIN (PORCINE) PER 1000 UNITS: Performed by: INTERNAL MEDICINE

## 2019-11-11 PROCEDURE — 25010000002 HYDROMORPHONE PER 4 MG: Performed by: HOSPITALIST

## 2019-11-11 PROCEDURE — 93306 TTE W/DOPPLER COMPLETE: CPT | Performed by: INTERNAL MEDICINE

## 2019-11-11 PROCEDURE — 84484 ASSAY OF TROPONIN QUANT: CPT | Performed by: INTERNAL MEDICINE

## 2019-11-11 PROCEDURE — 84132 ASSAY OF SERUM POTASSIUM: CPT | Performed by: HOSPITALIST

## 2019-11-11 PROCEDURE — 93005 ELECTROCARDIOGRAM TRACING: CPT | Performed by: INTERNAL MEDICINE

## 2019-11-11 PROCEDURE — 74018 RADEX ABDOMEN 1 VIEW: CPT

## 2019-11-11 PROCEDURE — 93306 TTE W/DOPPLER COMPLETE: CPT

## 2019-11-11 PROCEDURE — 25010000002 HYDROMORPHONE PER 4 MG

## 2019-11-11 PROCEDURE — 80053 COMPREHEN METABOLIC PANEL: CPT | Performed by: INTERNAL MEDICINE

## 2019-11-11 PROCEDURE — 25810000003 SODIUM CHLORIDE 0.9 % WITH KCL 20 MEQ 20-0.9 MEQ/L-% SOLUTION: Performed by: INTERNAL MEDICINE

## 2019-11-11 PROCEDURE — 51703 INSERT BLADDER CATH COMPLEX: CPT | Performed by: UROLOGY

## 2019-11-11 PROCEDURE — 25010000002 MORPHINE PER 10 MG: Performed by: HOSPITALIST

## 2019-11-11 PROCEDURE — 99222 1ST HOSP IP/OBS MODERATE 55: CPT | Performed by: SURGERY

## 2019-11-11 RX ORDER — HYDROXYZINE HYDROCHLORIDE 25 MG/1
25 TABLET, FILM COATED ORAL NIGHTLY
Status: DISCONTINUED | OUTPATIENT
Start: 2019-11-12 | End: 2019-11-13 | Stop reason: HOSPADM

## 2019-11-11 RX ORDER — BISACODYL 10 MG
10 SUPPOSITORY, RECTAL RECTAL DAILY
Status: DISCONTINUED | OUTPATIENT
Start: 2019-11-11 | End: 2019-11-13 | Stop reason: HOSPADM

## 2019-11-11 RX ORDER — POTASSIUM CHLORIDE 7.45 MG/ML
10 INJECTION INTRAVENOUS
Status: COMPLETED | OUTPATIENT
Start: 2019-11-11 | End: 2019-11-11

## 2019-11-11 RX ORDER — HYDROMORPHONE HYDROCHLORIDE 1 MG/ML
0.5 INJECTION, SOLUTION INTRAMUSCULAR; INTRAVENOUS; SUBCUTANEOUS ONCE
Status: COMPLETED | OUTPATIENT
Start: 2019-11-11 | End: 2019-11-11

## 2019-11-11 RX ORDER — THIAMINE MONONITRATE (VIT B1) 100 MG
100 TABLET ORAL DAILY
Status: DISCONTINUED | OUTPATIENT
Start: 2019-11-11 | End: 2019-11-13 | Stop reason: HOSPADM

## 2019-11-11 RX ORDER — CLOPIDOGREL BISULFATE 75 MG/1
75 TABLET ORAL DAILY
Status: DISCONTINUED | OUTPATIENT
Start: 2019-11-11 | End: 2019-11-13 | Stop reason: HOSPADM

## 2019-11-11 RX ORDER — HYDROMORPHONE HCL 110MG/55ML
PATIENT CONTROLLED ANALGESIA SYRINGE INTRAVENOUS
Status: COMPLETED
Start: 2019-11-11 | End: 2019-11-11

## 2019-11-11 RX ORDER — ASPIRIN 81 MG/1
81 TABLET ORAL DAILY
Status: DISCONTINUED | OUTPATIENT
Start: 2019-11-11 | End: 2019-11-13 | Stop reason: HOSPADM

## 2019-11-11 RX ORDER — PANTOPRAZOLE SODIUM 40 MG/1
40 TABLET, DELAYED RELEASE ORAL NIGHTLY
Status: DISCONTINUED | OUTPATIENT
Start: 2019-11-11 | End: 2019-11-13 | Stop reason: HOSPADM

## 2019-11-11 RX ORDER — LIDOCAINE HYDROCHLORIDE 20 MG/ML
JELLY TOPICAL ONCE
Status: DISCONTINUED | OUTPATIENT
Start: 2019-11-11 | End: 2019-11-11

## 2019-11-11 RX ORDER — SODIUM CHLORIDE AND POTASSIUM CHLORIDE 150; 900 MG/100ML; MG/100ML
75 INJECTION, SOLUTION INTRAVENOUS CONTINUOUS
Status: DISCONTINUED | OUTPATIENT
Start: 2019-11-11 | End: 2019-11-11

## 2019-11-11 RX ORDER — FINASTERIDE 5 MG/1
5 TABLET, FILM COATED ORAL DAILY
Status: DISCONTINUED | OUTPATIENT
Start: 2019-11-11 | End: 2019-11-13 | Stop reason: HOSPADM

## 2019-11-11 RX ORDER — LIDOCAINE HYDROCHLORIDE 20 MG/ML
JELLY TOPICAL ONCE
Status: COMPLETED | OUTPATIENT
Start: 2019-11-11 | End: 2019-11-11

## 2019-11-11 RX ORDER — MORPHINE SULFATE 2 MG/ML
0.5 INJECTION, SOLUTION INTRAMUSCULAR; INTRAVENOUS EVERY 4 HOURS PRN
Status: DISCONTINUED | OUTPATIENT
Start: 2019-11-11 | End: 2019-11-13 | Stop reason: HOSPADM

## 2019-11-11 RX ORDER — TAMSULOSIN HYDROCHLORIDE 0.4 MG/1
0.4 CAPSULE ORAL DAILY
Status: DISCONTINUED | OUTPATIENT
Start: 2019-11-11 | End: 2019-11-13 | Stop reason: HOSPADM

## 2019-11-11 RX ORDER — ATORVASTATIN CALCIUM 40 MG/1
40 TABLET, FILM COATED ORAL DAILY
Status: DISCONTINUED | OUTPATIENT
Start: 2019-11-11 | End: 2019-11-13 | Stop reason: HOSPADM

## 2019-11-11 RX ADMIN — POTASSIUM CHLORIDE 10 MEQ: 7.46 INJECTION, SOLUTION INTRAVENOUS at 16:45

## 2019-11-11 RX ADMIN — HYDROMORPHONE HYDROCHLORIDE 1 MG: 2 INJECTION, SOLUTION INTRAMUSCULAR; INTRAVENOUS; SUBCUTANEOUS at 16:04

## 2019-11-11 RX ADMIN — POTASSIUM CHLORIDE 10 MEQ: 7.46 INJECTION, SOLUTION INTRAVENOUS at 14:28

## 2019-11-11 RX ADMIN — HEPARIN SODIUM 5000 UNITS: 5000 INJECTION INTRAVENOUS; SUBCUTANEOUS at 20:09

## 2019-11-11 RX ADMIN — POTASSIUM CHLORIDE 10 MEQ: 7.46 INJECTION, SOLUTION INTRAVENOUS at 12:02

## 2019-11-11 RX ADMIN — PANTOPRAZOLE SODIUM 40 MG: 40 TABLET, DELAYED RELEASE ORAL at 20:09

## 2019-11-11 RX ADMIN — POTASSIUM CHLORIDE 10 MEQ: 7.46 INJECTION, SOLUTION INTRAVENOUS at 15:24

## 2019-11-11 RX ADMIN — TAMSULOSIN HYDROCHLORIDE 0.4 MG: 0.4 CAPSULE ORAL at 17:10

## 2019-11-11 RX ADMIN — POTASSIUM CHLORIDE AND SODIUM CHLORIDE 50 ML/HR: 900; 150 INJECTION, SOLUTION INTRAVENOUS at 01:42

## 2019-11-11 RX ADMIN — MORPHINE SULFATE 0.5 MG: 2 INJECTION, SOLUTION INTRAMUSCULAR; INTRAVENOUS at 13:25

## 2019-11-11 RX ADMIN — LIDOCAINE HYDROCHLORIDE: 20 JELLY TOPICAL at 15:36

## 2019-11-11 RX ADMIN — MORPHINE SULFATE 0.5 MG: 2 INJECTION, SOLUTION INTRAMUSCULAR; INTRAVENOUS at 22:23

## 2019-11-11 RX ADMIN — FINASTERIDE 5 MG: 5 TABLET, FILM COATED ORAL at 17:10

## 2019-11-11 RX ADMIN — HEPARIN SODIUM 5000 UNITS: 5000 INJECTION INTRAVENOUS; SUBCUTANEOUS at 01:42

## 2019-11-11 RX ADMIN — SODIUM CHLORIDE, PRESERVATIVE FREE 10 ML: 5 INJECTION INTRAVENOUS at 20:09

## 2019-11-11 RX ADMIN — METOPROLOL TARTRATE 12.5 MG: 25 TABLET, FILM COATED ORAL at 20:09

## 2019-11-11 RX ADMIN — HYDROMORPHONE HYDROCHLORIDE 0.5 MG: 1 INJECTION, SOLUTION INTRAMUSCULAR; INTRAVENOUS; SUBCUTANEOUS at 14:34

## 2019-11-11 RX ADMIN — Medication 10 MG: at 11:53

## 2019-11-11 NOTE — H&P
Hospitalist History and Physical    Patient Identification:  Name: Demarcus Pastor  Age/Sex: 86 y.o. male  :  1933  MRN: [id number]         Primary Care Physician: Marcus Adames MD    Chief Complaint   Patient presents with   • Chest Pain   • Groin Pain       History of Present Illness  Patient is a 86 y.o. 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    Apparently, [place] to the emergency department notes, the patient was very sore in his groin upon presentation.  The patient reportedly told the emergency department physician that he had left groin pain for a few days.  Otherwise the patient did not have any other complaints at that time.  Later on during the patient's emergency department visit, nursing staff reported that the patient complained of chest pain.    On my exam, the patient is resting comfortably in bed.  History is limited by his speech difficulties.  The patient currently denies chest pain.  He denies any abdominal pain and/or groin pain.  He denies recent cough.  He denies recent shortness of air.  He denies any recent vomiting.    In the emergency department, patient had a CT scan of the abdomen and pelvis without contrast that revealed bilateral fat-containing inguinal hernias.  There was no evidence for incarceration or strangulation.  He was also found to have some multiple air-fluid levels with prominent small bowel loops thought to be related to an ileus versus early or low-grade bowel obstruction.  It was also found to have sigmoid diverticulosis.  There was biliary air noted that was thought to be related to a prior sphincterotomy.  Patient is status post cholecystectomy.  Patient also had an  ultrasound of his testicles which revealed that both testicles were normal.  He had a 2.7 cm simple right epididymal head cyst.  And was noted small left-sided fat-containing inguinal hernia that appeared stable from 2016.  Patient had a lactic acid level of 3.6 upon arrival to the emergency department.  The initial troponin T level was 0.045.  proBNP was 6150.  CMP was remarkable for potassium of 3.3, creatinine 1.32 and an anion gap of 16.  TSH was mildly elevated at 5.230.  CBC was unremarkable with the exception of hemoglobin of 17 and hematocrit of 49.9.    The patient noted to have EKGs which revealed sinus rhythm with T wave changes mostly in the lateral leads.  Patient was evaluated by cardiology in the emergency department recommended an echocardiogram in the morning with ongoing medical management.    Patient has been admitted to the telemetry unit for further evaluation and management.    Present during visit: BREE Shah    Past History:  Past Medical History:   Diagnosis Date   • Abnormal CT of the abdomen 6/19/2016   • Colitis 6/19/2016   • CVA (cerebral vascular accident) (CMS/HCC)    • History of transfusion    • Hyperlipidemia    • Hypertension    • Pancreatitis      Past Surgical History:   Procedure Laterality Date   • COLONOSCOPY N/A 11/9/2016    Procedure: COLONOSCOPY CPT CODE: 93095;  Surgeon: Lilliam Wilson DO;  Location: [facility] OR;  Service:    • ENDOSCOPY N/A 6/17/2016    Procedure: ESOPHAGOGASTRODUODENOSCOPY;  Surgeon: Alex Robles III, MD;  Location: [facility] OR;  Service:    • ENDOSCOPY N/A 11/9/2016    Procedure: ESOPHAGOGASTRODUODENOSCOPY WITH BIOPSY CPT CODE: 72742;  Surgeon: Lilliam Wilson DO;  Location: [facility] OR;  Service:    • ESOPHAGEAL DILATATION       Family History   Problem Relation Age of Onset   • Stroke Mother    • Heart disease Brother      Social History     Tobacco Use   • Smoking status: Never Smoker   • Smokeless tobacco: Never Used   Substance Use Topics    • Alcohol use: No   • Drug use: No     Medications Prior to Admission   Medication Sig Dispense Refill Last Dose   • aspirin 81 MG EC tablet Take 81 mg by mouth Daily.   11/10/2019 at 0800   • atorvastatin (LIPITOR) 40 MG tablet Take 40 mg by mouth Daily.   11/9/2019 at 2000   • clopidogrel (PLAVIX) 75 MG tablet Take 75 mg by mouth Daily.   11/10/2019 at 0800   • hydrOXYzine (ATARAX) 25 MG tablet Take 25 mg by mouth Every Night.   11/9/2019 at 2000   • metoprolol tartrate (LOPRESSOR) 25 MG tablet Take 12.5 mg by mouth 2 (Two) Times a Day.   11/10/2019 at 1600   • pantoprazole (PROTONIX) 40 MG EC tablet Take 40 mg by mouth Every Night.   11/9/2019 at 2000   • sacubitril-valsartan (ENTRESTO) 24-26 MG tablet Take 1 tablet by mouth Daily.   11/10/2019 at 0800   • thiamine (VITAMIN B-1) 100 MG tablet Take 100 mg by mouth Daily.   11/10/2019 at 0800     Allergies: Penicillins    Review of Systems:  Review of Systems   Respiratory: Negative for shortness of breath.    Cardiovascular: Negative for chest pain.   Gastrointestinal: Negative for abdominal pain and vomiting.   Genitourinary: Negative for testicular pain.     Complete ROS difficult to obtain due to expressive aphasia.    Vital Signs  Temp:  [97.9 °F (36.6 °C)] 97.9 °F (36.6 °C)  Heart Rate:  [73-95] 95  Resp:  [20-22] 20  BP: (149-158)/() 152/100  Body mass index is 21.93 kg/m².    Physical Exam:  Physical Exam   Constitutional: He appears well-developed and well-nourished. No distress. Nasal cannula in place.   Thin, chronically ill-appearing and in no acute distress.   HENT:   Head: Normocephalic and atraumatic.   Mouth/Throat: Oropharynx is clear and moist.   Eyes: Conjunctivae and EOM are normal.   Neck: Neck supple. No tracheal deviation present.   Cardiovascular: Normal rate and regular rhythm. Exam reveals no gallop and no friction rub.   No murmur heard.  Pulmonary/Chest: Breath sounds normal. No respiratory distress. He has no wheezes. He has no  rales.   Abdominal: Soft. Bowel sounds are normal. He exhibits no distension. There is no hepatomegaly. There is no tenderness. There is no guarding. A hernia is present. Hernia confirmed positive in the left inguinal area (Not reducible, not painful to palpation.).   Musculoskeletal: Normal range of motion. He exhibits no tenderness.   Neurological: He is alert.   Right hand contracture.     Skin: Skin is warm and dry. No rash noted. There is erythema (Bilateral feet and distal extremities; likely due to crossing his legs.).   Psychiatric: He has a normal mood and affect.      Results Review:    Results from last 7 days   Lab Units 11/10/19  1724   WBC 10*3/mm3 6.13   HEMOGLOBIN g/dL 17.0   HEMATOCRIT % 49.9   PLATELETS 10*3/mm3 290     Results from last 7 days   Lab Units 11/10/19  1724   SODIUM mmol/L 142   POTASSIUM mmol/L 3.3*   CHLORIDE mmol/L 104   CO2 mmol/L 22.0   BUN mg/dL 12   CREATININE mg/dL 1.32*   CALCIUM mg/dL 9.7   GLUCOSE mg/dL 131*     Results from last 7 days   Lab Units 11/10/19  1724   BILIRUBIN mg/dL 0.7   ALK PHOS U/L 70   AST (SGOT) U/L 32   ALT (SGPT) U/L 22         Results from last 7 days   Lab Units 11/10/19  1724   MAGNESIUM mg/dL 2.0     Results from last 7 days   Lab Units 11/10/19  1940 11/10/19  1724   CK TOTAL U/L  --  144   TROPONIN T ng/mL 0.037* 0.045*     Results from last 7 days   Lab Units 11/10/19  1724   INR  0.92       Imaging:    I have personally reviewed the EKG. Multiple EKGs reviewed; SR with prolonged QTc, mild ST elevation in anterior leads; T wave abnormalities noted in the lateral leads.    CT images reviewed; left inguinal hernia noted; otherwise no acute abnormalities noted.    Imaging Results (Most Recent)     Procedure Component Value Units Date/Time    US Scrotum & Testicles [[id number]] Collected:  11/10/19 1916     Updated:  11/10/19 1918    Narrative:       US Scrotum and Contents    INDICATION:  Left-sided groin and scrotal pain. Suspected left-sided  hernia    TECHNIQUE:   Sonographic evaluation of the scrotum. Color and pulsed wave doppler ultrasound was used to assess testicular vasculature.    COMPARISON:   CT abdomen and pelvis 11/7/2016 and 11/10/2019    FINDINGS:  The right testicle is 2.6 x 4.1 x 2.5 cm. It is normal in appearance with some dilated rete testes visible. There is a cyst superior to the testicle consistent with an epididymal cyst. It measures 2.7 cm in diameter. This is a simple cyst. There is  normal flow in the right testicle. Left testicle measures 2.2 x 3.8 x 3.1 cm and has normal flow and normal echotexture. The left groin was evaluated and some hypoechoic longitudinally oriented material is visible. The CT scan done approximately same  time may show some fat within the inguinal canal hernia. No bowel involvement is noted.      Impression:       Both testicles are normal    2.7 cm simple right epididymal head cyst    The ultrasound suggests that there is some fatty material in the inguinal canal with different echotexture than the adjacent fatty tissue and on the CT scan there may be a small amount of fat extending down the inguinal canal to the patient may have a  small fat-containing inguinal hernia which appears unchanged from the 2016 CT scan.    Please note that the initial images on the study are incorrectly labeled right groin when they actually represent the left groin according to the technologist. She is going to re-labeled those images    Signer Name: Oleg Garcia MD   Signed: 11/10/2019 7:16 PM   Workstation Name: [email or web address]    [facility] Chest 1 View [[id number]] Collected:  11/10/19 1839     Updated:  11/10/19 1842    Narrative:       CR Chest 1 Vw    INDICATION:   Chest pain     COMPARISON:    Chest x-ray 11/7/2016    FINDINGS:  Single portable AP view(s) of the chest.  Postoperative changes left upper quadrant. Cardiac silhouette size is top normal. Atherosclerotic carotid vascular  calcification noted at the aortic knob. The thoracic aorta is at least ectatic and the patient is  mildly progressed from the prior study. There is some bibasilar atelectasis and mild prominence of interstitial markings in general. No pneumothorax.      Impression:         1. Ectasia of the thoracic aorta mildly progressed from prior study.  2. Mild bibasilar atelectasis and prominence of interstitial markings in general.    Signer Name: Dorothy Coronado MD   Signed: 11/10/2019 6:39 PM   Workstation Name: [email or web address]    [facility]    CT Abdomen Pelvis Without Contrast [[id number]] Collected:  11/10/19 1838     Updated:  11/10/19 1840    Narrative:       INDICATION:   Concern for strangulated inguinal hernia. Patient is unable to provide a history.    TECHNIQUE:   CT of the abdomen and pelvis without contrast. Coronal and sagittal reconstructions were obtained.  Radiation dose reduction techniques included automated exposure control or exposure modulation based on body size. Radiation audit for number of CT and  nuclear cardiology exams performed in the last year: 0.      COMPARISON:   Abdomen pelvis CT scan 11/2/2016.    FINDINGS:  Lung bases: Dependent atelectasis. There are again densities left lung base/chest wall consistent with old surgical clips. This is unchanged and results in some artifact. There are coronary artery calcifications. There is a small hiatal hernia.    Abdomen: Lack of intravenous contrast media limits the study.    There is a streak artifact since the patient is unable to raise his arms. Intrahepatic biliary air is present as is air in the common bile duct. Please correlate for history of sphincterotomy. The patient is postcholecystectomy. The noncontrast liver is  otherwise grossly unremarkable allowing for the artifact from the arms. There is apparently been a splenectomy previously. The right adrenal gland is unremarkable. There is stable thickening of the left adrenal  gland. There is no intrarenal calculus or  hydronephrosis. There are vascular calcifications involving the abdominal aorta with ectasia but no abdominal aortic aneurysm. 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  Noncontrast pancreas is not well seen and likely atrophic distally.    Visualized appendix is unremarkable.    There are multiple air-fluid levels within prominent small bowel loops but there is no definite transition point. Etiology not clear. This could reflect mild ileus. Early or low-grade obstruction not excluded. Follow-up recommended.    There are degenerative changes in the lumbar spine. There is no percutaneously drainable fluid collection or free intraperitoneal air appreciated.    Pelvis: Prostate gland is large measuring about 6 cm diameter. There are subtle densities dependently in the bladder concerning for small stones. Stones were also seen previously.    There is a large amount of gas and stool in the rectum and there is sigmoid diverticulosis. There is no CT evidence for acute diverticulitis.     There are fat-containing inguinal hernias bilaterally. There is no bowel appreciated within either the right or left inguinal hernia. Sigmoid colon protrudes to the orifice of the left inguinal hernia area      Impression:         1. There are fat-containing inguinal hernias bilaterally. There is no evidence for incarcerated bowel.  2. Multiple air-fluid levels within prominent small bowel loops. No transition point is appreciated. This is quite nonspecific and likely related to ileus. Early or low-grade obstruction not excluded. Follow-up recommended.  3. Please note the study is limited by technical factors and lack of contrast media. Prostate gland is enlarged and small bladder calculi are again seen.  5. Patient's post cholecystectomy and there is biliary air probably due to prior sphincterotomy. Please  confirm clinically.  6. Post splenectomy.  7. Sigmoid diverticulosis without evidence for diverticulitis.  8. Atherosclerotic vascular disease.        Signer Name: Dorothy Coronado MD   Signed: 11/10/2019 6:38 PM   Workstation Name: [email or web address]    [facility]          Assessment/Plan     -Reported abdominal pain/left groin pain that may be related to an inguinal hernia: No strangulation or ulceration noted per CT imaging.  She is currently nothing by mouth.  I will place him on gentle IV fluids and as needed pain and nausea medication.  General surgery was contacted from the emergency department; will make an official consultation for further evaluation.    -SIRS criteria with lactic acidosis but no obvious source of infection: Will repeat lactic acid level after IV fluids. Will hold on IV antibiotics. Repeat CBC and lactic acid level again in am. Monitor temperature curve.     -Mild troponin T elevation with nonspecific EKG changes: Patient seen by cardiology in the emergency department.  She has received aspirin.  Patient has been placed on telemetry.  Continue to trend his troponin T levels.  Obtain an echocardiogram.  Currently awaiting patient's home medication list.    -Mild TSH elevation: Obtain free T4.    -History of cerebrovascular accident with expressive aphasia: Supportive care.  Currently awaiting patient's home medication list.  Patient is on a puréed diet at the nursing home with no milk or tea.    -Elevated anion gap: Patient currently n.p.o.  Continue gentle IV fluid hydration.  Repeat chemistry panel in a.m.    -Elevated proBNP level: Clinically patient does not appear volume overloaded.  No documented history of CHF.  I will order an echocardiogram in a.m.    DVT prophylaxis: Subcutaneous heparin    Estimated Length of Stay: > 2 MNs    Code: DNR per review of nursing home records    Patient is considered to be a high risk patient due to: Advanced age, history of CVA and  hypertension EKG changes and lactic acidosis    I discussed the patients findings and my recommendations with patient and nursing staff    Mallorie Oliver DO  11/11/19  12:01 AM

## 2019-11-11 NOTE — CONSULTS
Consults    Patient Identification:    Name:  Demarcus Pastor  Age:  86 y.o.  Sex:  male  :  1933  MRN:  [id number]  Visit Number:  [id number]  Primary care provider:  Marcus Adames MD    Chief complaint:   Rt groin pain  Abnormal ekg  St elevation in anterior leads    History of presenting illness:   Transferred fron NH due to groin pain  Pt has inguinal hernia history  Awake ,alert  .Denies any chest pain  ---------------------------------------------------------------------------------------------------------------------  ROS: All systems reviewed and negative except noted above.  ---------------------------------------------------------------------------------------------------------------------   Past History:   Family History   Problem Relation Age of Onset   • Stroke Mother    • Heart disease Brother      Past Medical History:   Diagnosis Date   • Abnormal CT of the abdomen 2016   • Colitis 2016   • History of transfusion    • Hyperlipidemia    • Hypertension    • Pancreatitis      Past Surgical History:   Procedure Laterality Date   • COLONOSCOPY N/A 2016    Procedure: COLONOSCOPY CPT CODE: 59568;  Surgeon: Lilliam Wilson DO;  Location: [facility];  Service:    • ENDOSCOPY N/A 2016    Procedure: ESOPHAGOGASTRODUODENOSCOPY;  Surgeon: Alex Robles III, MD;  Location: [facility] OR;  Service:    • ENDOSCOPY N/A 2016    Procedure: ESOPHAGOGASTRODUODENOSCOPY WITH BIOPSY CPT CODE: 01831;  Surgeon: Lilliam Wilson DO;  Location: [facility];  Service:    • ESOPHAGEAL DILATATION       Social History     Socioeconomic History   • Marital status:      Spouse name: Not on file   • Number of children: Not on file   • Years of education: Not on file   • Highest education level: Not on file   Tobacco Use   • Smoking status: Never Smoker   • Smokeless tobacco: Never Used   Substance and Sexual Activity   • Alcohol use: No   • Drug use: No   • Sexual activity: Defer      ---------------------------------------------------------------------------------------------------------------------   Allergies:  Penicillins  ---------------------------------------------------------------------------------------------------------------------   Prior to Admission Medications     Prescriptions Last Dose Informant Patient Reported? Taking?    amLODIPine (NORVASC) 10 MG tablet  Pharmacy Yes No    Take 10 mg by mouth daily.    atenolol (TENORMIN) 50 MG tablet   Yes No    Take 50 mg by mouth Daily.    carbamide peroxide (DEBROX) 6.5 % otic solution   Yes No    5 drops 2 (Two) Times a Day.    carbidopa-levodopa (SINEMET)  MG per tablet  Pharmacy Yes No    Take 1 tablet by mouth 2 (two) times a day.    CloNIDine (CATAPRES) 0.3 MG tablet   Yes No    Take 0.3 mg by mouth 2 (Two) Times a Day.    enalapril (VASOTEC) 20 MG tablet   Yes No    Take 20 mg by mouth Daily.    ferrous sulfate 325 (65 FE) MG tablet   No No    Take 1 tablet by mouth 2 (Two) Times a Day With Meals.    finasteride (PROSCAR) 5 MG tablet  Pharmacy No No    Take 1 tablet by mouth Daily.    hydrALAZINE (APRESOLINE) 25 MG tablet  Pharmacy Yes No    Take 25 mg by mouth 4 (four) times a day.    Omega-3 Fatty Acids (FISH OIL) 1000 MG capsule capsule   Yes No    Take  by mouth Daily With Breakfast.    pantoprazole (PROTONIX) 40 MG EC tablet   No No    Take 1 tablet by mouth 2 (Two) Times a Day Before Meals. For stomach    tamsulosin (FLOMAX) 0.4 MG capsule 24 hr capsule  Pharmacy No No    Take 1 capsule by mouth Every Night.    Patient taking differently:  Take 1 capsule by mouth Daily.    triamcinolone (KENALOG) 0.1 % cream   Yes No    Apply  topically 2 (Two) Times a Day.        [facility] Meds:        ---------------------------------------------------------------------------------------------------------------------   Vital Signs:  Temp:  [97.9 °F (36.6 °C)] 97.9 °F (36.6 °C)  Heart Rate:  [73-90] 90  Resp:  [20-22] 20  BP:  (149-158)/() 149/115      11/10/19  1721   Weight: 63.5 kg (140 lb)     Body mass index is 21.93 kg/m².  ---------------------------------------------------------------------------------------------------------------------   Physical exam:   Constitutional:  well-nourished.  No respiratory distress.      HENT:  Head: Normocephalic and atraumatic.  Mouth:  Moist mucous membranes.    Eyes:  Conjunctivae and EOM are normal.  Pupils are equal, round, and reactive to light.  No scleral icterus.  Neck:  Neck supple.  No JVD present.    Cardiovascular:  Normal rate, regular rhythm and normal heart sounds with no murmur.  Pulmonary/Chest:  No respiratory distress, no wheezes, no crackles, with normal breath sounds and good air movement.  Abdominal:  Soft.  Bowel sounds are normal.  No distension and no tenderness.   Musculoskeletal:  No edema, no tenderness, and no deformity.  No red or swollen joints anywhere.    Neurological:  Alert and oriented to person, place, and time.  No cranial nerve deficit.  No tongue deviation.  No facial droop.  No slurred speech.   Skin:  Skin is warm and dry.  No rash noted.  No pallor.      Peripheral vascular:  No edema and strong pulses on all 4 extremities.    ---------------------------------------------------------------------------------------------------------------------   EKG: nsr  Telemetry: nsr  I have personally looked at both the EKG and the telemetry strips.  Echo:     ---------------------------------------------------------------------------------------------------------------------   Results from last 7 days   Lab Units 11/10/19  1741 11/10/19  1724   LACTATE mmol/L 3.6*  --    WBC 10*3/mm3  --  6.13   HEMOGLOBIN g/dL  --  17.0   HEMATOCRIT %  --  49.9   MCV fL  --  90.1   MCHC g/dL  --  34.1   PLATELETS 10*3/mm3  --  290   INR   --  0.92         Results from last 7 days   Lab Units 11/10/19  1724   SODIUM mmol/L 142   POTASSIUM mmol/L 3.3*   MAGNESIUM mg/dL 2.0    CHLORIDE mmol/L 104   CO2 mmol/L 22.0   BUN mg/dL 12   CREATININE mg/dL 1.32*   EGFR IF NONAFRICN AM mL/min/1.73 51*   CALCIUM mg/dL 9.7   GLUCOSE mg/dL 131*   ALBUMIN g/dL 3.97   BILIRUBIN mg/dL 0.7   ALK PHOS U/L 70   AST (SGOT) U/L 32   ALT (SGPT) U/L 22   Estimated Creatinine Clearance: 36.1 mL/min (A) (by C-G formula based on SCr of 1.32 mg/dL (H)).  No results found for: AMMONIA  Results from last 7 days   Lab Units 11/10/19  1940 11/10/19  1724   CK TOTAL U/L  --  144   TROPONIN T ng/mL 0.037* 0.045*     Results from last 7 days   Lab Units 11/10/19  1724   PROBNP pg/mL 6,150.0*     No results found for: HGBA1C  Lab Results   Component Value Date    TSH 5.230 (H) 11/10/2019     No results found for: PREGTESTUR, PREGSERUM, HCG, HCGQUANT  Pain Management Panel     Pain Management Panel Latest Ref Rng & Units 1/13/2014    AMPHETAMINES SCREEN, URINE NEGATIVE Negative    BARBITURATES SCREEN NEGATIVE Negative    BENZODIAZEPINE SCREEN, URINE NEGATIVE Negative    COCAINE SCREEN, URINE NEGATIVE Negative    METHADONE SCREEN, URINE NEGATIVE Negative                          ---------------------------------------------------------------------------------------------------------------------   Imaging Results (Last 7 Days)     Procedure Component Value Units Date/Time    US Scrotum & Testicles [[id number]] Collected:  11/10/19 1916     Updated:  11/10/19 1918    Narrative:       US Scrotum and Contents    INDICATION:  Left-sided groin and scrotal pain. Suspected left-sided hernia    TECHNIQUE:   Sonographic evaluation of the scrotum. Color and pulsed wave doppler ultrasound was used to assess testicular vasculature.    COMPARISON:   CT abdomen and pelvis 11/7/2016 and 11/10/2019    FINDINGS:  The right testicle is 2.6 x 4.1 x 2.5 cm. It is normal in appearance with some dilated rete testes visible. There is a cyst superior to the testicle consistent with an epididymal cyst. It measures 2.7 cm in diameter. This is a  simple cyst. There is  normal flow in the right testicle. Left testicle measures 2.2 x 3.8 x 3.1 cm and has normal flow and normal echotexture. The left groin was evaluated and some hypoechoic longitudinally oriented material is visible. The CT scan done approximately same  time may show some fat within the inguinal canal hernia. No bowel involvement is noted.      Impression:       Both testicles are normal    2.7 cm simple right epididymal head cyst    The ultrasound suggests that there is some fatty material in the inguinal canal with different echotexture than the adjacent fatty tissue and on the CT scan there may be a small amount of fat extending down the inguinal canal to the patient may have a  small fat-containing inguinal hernia which appears unchanged from the 2016 CT scan.    Please note that the initial images on the study are incorrectly labeled right groin when they actually represent the left groin according to the technologist. She is going to re-labeled those images    Signer Name: Oleg Garcia MD   Signed: 11/10/2019 7:16 PM   Workstation Name: [email or web address]    [facility]    XR Chest 1 View [[id number]] Collected:  11/10/19 1839     Updated:  11/10/19 1842    Narrative:       CR Chest 1 Vw    INDICATION:   Chest pain     COMPARISON:    Chest x-ray 11/7/2016    FINDINGS:  Single portable AP view(s) of the chest.  Postoperative changes left upper quadrant. Cardiac silhouette size is top normal. Atherosclerotic carotid vascular calcification noted at the aortic knob. The thoracic aorta is at least ectatic and the patient is  mildly progressed from the prior study. There is some bibasilar atelectasis and mild prominence of interstitial markings in general. No pneumothorax.      Impression:         1. Ectasia of the thoracic aorta mildly progressed from prior study.  2. Mild bibasilar atelectasis and prominence of interstitial markings in general.    Signer Name: Dorothy Coronado MD    Signed: 11/10/2019 6:39 PM   Workstation Name: [email or web address]    [facility] [place]    CT Abdomen Pelvis Without Contrast [[id number]] Collected:  11/10/19 1838     Updated:  11/10/19 1840    Narrative:       INDICATION:   Concern for strangulated inguinal hernia. Patient is unable to provide a history.    TECHNIQUE:   CT of the abdomen and pelvis without contrast. Coronal and sagittal reconstructions were obtained.  Radiation dose reduction techniques included automated exposure control or exposure modulation based on body size. Radiation audit for number of CT and  nuclear cardiology exams performed in the last year: 0.      COMPARISON:   Abdomen pelvis CT scan 11/2/2016.    FINDINGS:  Lung bases: Dependent atelectasis. There are again densities left lung base/chest wall consistent with old surgical clips. This is unchanged and results in some artifact. There are coronary artery calcifications. There is a small hiatal hernia.    Abdomen: Lack of intravenous contrast media limits the study.    There is a streak artifact since the patient is unable to raise his arms. Intrahepatic biliary air is present as is air in the common bile duct. Please correlate for history of sphincterotomy. The patient is postcholecystectomy. The noncontrast liver is  otherwise grossly unremarkable allowing for the artifact from the arms. There is apparently been a splenectomy previously. The right adrenal gland is unremarkable. There is stable thickening of the left adrenal gland. There is no intrarenal calculus or  hydronephrosis. There are vascular calcifications involving the abdominal aorta with ectasia but no abdominal aortic aneurysm. 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  Noncontrast pancreas is not well seen and likely atrophic distally.    Visualized appendix is unremarkable.    There are multiple air-fluid levels within prominent  small bowel loops but there is no definite transition point. Etiology not clear. This could reflect mild ileus. Early or low-grade obstruction not excluded. Follow-up recommended.    There are degenerative changes in the lumbar spine. There is no percutaneously drainable fluid collection or free intraperitoneal air appreciated.    Pelvis: Prostate gland is large measuring about 6 cm diameter. There are subtle densities dependently in the bladder concerning for small stones. Stones were also seen previously.    There is a large amount of gas and stool in the rectum and there is sigmoid diverticulosis. There is no CT evidence for acute diverticulitis.     There are fat-containing inguinal hernias bilaterally. There is no bowel appreciated within either the right or left inguinal hernia. Sigmoid colon protrudes to the orifice of the left inguinal hernia area      Impression:         1. There are fat-containing inguinal hernias bilaterally. There is no evidence for incarcerated bowel.  2. Multiple air-fluid levels within prominent small bowel loops. No transition point is appreciated. This is quite nonspecific and likely related to ileus. Early or low-grade obstruction not excluded. Follow-up recommended.  3. Please note the study is limited by technical factors and lack of contrast media. Prostate gland is enlarged and small bladder calculi are again seen.  5. Patient's post cholecystectomy and there is biliary air probably due to prior sphincterotomy. Please confirm clinically.  6. Post splenectomy.  7. Sigmoid diverticulosis without evidence for diverticulitis.  8. Atherosclerotic vascular disease.        Signer Name: Dorothy Coronado MD   Signed: 11/10/2019 6:38 PM   Workstation Name: [email or web address]    [facility] [place]        ----------------------------------------------------------------------------------------------------------------------  Assessment:   Trace positive troponin  Elevated lactic acid  St  elevation with Q wave in anterior leads with no active c/p,old MI  PT is DNR  Inguinal hernia      Plan:   ECHO in am  Cont home meds  Medical management  D/w ER physician  Thank you for the consult.    Fran Rivera MD  11/10/19  8:31 PM

## 2019-11-11 NOTE — PLAN OF CARE
Problem: Fall Risk (Adult)  Goal: Identify Related Risk Factors and Signs and Symptoms  Outcome: Ongoing (interventions implemented as appropriate)    Goal: Absence of Fall  Outcome: Ongoing (interventions implemented as appropriate)      Problem: Skin Injury Risk (Adult)  Goal: Identify Related Risk Factors and Signs and Symptoms  Outcome: Ongoing (interventions implemented as appropriate)    Goal: Skin Health and Integrity  Outcome: Ongoing (interventions implemented as appropriate)      Problem: Pain, Chronic (Adult)  Goal: Identify Related Risk Factors and Signs and Symptoms  Outcome: Ongoing (interventions implemented as appropriate)    Goal: Acceptable Pain/Comfort Level and Functional Ability  Outcome: Ongoing (interventions implemented as appropriate)

## 2019-11-11 NOTE — PROGRESS NOTES
"   LOS: 1 day     Name: Demarcus Pastor  Age/Sex: 86 y.o. male  :  1933        PCP: Marcus Adames MD    Active Problems:    Abdominal pain      Admission Information: Demarcus Pastor is a 86 y.o. male nursing home resident with a past medical history significant for CVA with expressive aphasia, systolic heart failure, hypertension and hyperlipidemia.  He presented to the ED with report of abdominal pain and groin pain.     Chief Complaint: Follow up elevated troponin and abnormal EKG    Interval history: Patient was seen and examined.  He answers \"yes and no\" questions easily.  He denies chest pain.  He denies past medical history of coronary artery disease.  He denies seeing a cardiologist regularly.    Subjective     Review of Systems   Constitution: Negative for weakness and malaise/fatigue.   Cardiovascular: Negative for chest pain, dyspnea on exertion, irregular heartbeat, leg swelling, near-syncope, orthopnea, palpitations, paroxysmal nocturnal dyspnea and syncope.   Respiratory: Negative for shortness of breath.    Hematologic/Lymphatic: Does not bruise/bleed easily.   Neurological: Negative for dizziness and light-headedness.       Vital Signs  Vital Signs (last 72 hrs)        07  -   0659 700  -  11/10 0659 11/10 0700  -   0659 700  -   1739   Most Recent    Temp (°F)     96.9 -  98       97.6 (36.4)    Heart Rate     54 -  95      90     90    Resp     18 -         18    BP     100/62 -  162/98      126/84     126/84    SpO2 (%)     94 -  98      97     97        Temp:  [96.9 °F (36.1 °C)-98 °F (36.7 °C)] 97.6 °F (36.4 °C)  Heart Rate:  [54-95] 90  Resp:  [18-] 18  BP: (100-162)/() 126/84  Body mass index is 19.71 kg/m².      Intake/Output Summary (Last 24 hours) at 2019 173  Last data filed at 2019 0900  Gross per 24 hour   Intake --   Output 200 ml   Net -200 ml       Physical Exam   Constitutional: He is oriented to person, place, and " time. He appears well-developed and well-nourished.   Neck: No JVD present. Carotid bruit is not present.   Cardiovascular: Normal rate and regular rhythm.   No lower extremity edema   Pulmonary/Chest: Effort normal and breath sounds normal. No respiratory distress. He has no wheezes. He has no rales.   Abdominal: Soft. Bowel sounds are normal. He exhibits no distension. There is tenderness.   Neurological: He is alert and oriented to person, place, and time.   Skin: Skin is warm and dry.   Psychiatric: He has a normal mood and affect. Cognition and memory are normal.   Vitals reviewed.      Telemetry:  Sinus 70s       Results Review:     Results from last 7 days   Lab Units 11/11/19  0106 11/10/19  1724   WBC 10*3/mm3 8.24 6.13   HEMOGLOBIN g/dL 14.8 17.0   PLATELETS 10*3/mm3 231 290     Results from last 7 days   Lab Units 11/11/19  0106 11/10/19  1724   SODIUM mmol/L 144 142   POTASSIUM mmol/L 3.3* 3.3*   CHLORIDE mmol/L 108* 104   CO2 mmol/L 24.7 22.0   BUN mg/dL 10 12   CREATININE mg/dL 1.16 1.32*   CALCIUM mg/dL 8.7 9.7   GLUCOSE mg/dL 97 131*     Results from last 7 days   Lab Units 11/11/19  1042 11/11/19  0730 11/11/19  0106 11/10/19  1940 11/10/19  1724   CK TOTAL U/L  --   --   --   --  144   TROPONIN T ng/mL 0.058* 0.050* 0.041* 0.037* 0.045*       Results from last 7 days   Lab Units 11/10/19  1724   INR  0.92       I reviewed the patient's new clinical results.  I reviewed the patient's new imaging results and agree with the interpretation.  I personally viewed and interpreted the patient's EKG/Telemetry data      Medication Review:     aspirin 81 mg Oral Daily   atorvastatin 40 mg Oral Daily   bisacodyl 10 mg Rectal Daily   clopidogrel 75 mg Oral Daily   finasteride 5 mg Oral Daily   heparin (porcine) 5,000 Units Subcutaneous Q12H   [START ON 11/12/2019] hydrOXYzine 25 mg Oral Nightly   metoprolol tartrate 12.5 mg Oral BID   pantoprazole 40 mg Oral Nightly   potassium chloride 10 mEq Intravenous Q1H    sacubitril-valsartan 1 tablet Oral Daily   sodium chloride 10 mL Intravenous Q12H   tamsulosin 0.4 mg Oral Daily   thiamine 100 mg Oral Daily       sodium chloride 0.9 % with KCl 20 mEq 75 mL/hr Last Rate: 75 mL/hr (11/11/19 1153)       Assessment:  1. Elevated troponin  2. ST elevation with Q waves in anterior leads, without reciprocal changes, representative of an old MI  3. History of CAD  4. Ischemic cardiomyopathy with LVEF of 36 to 40% per echocardiogram of 11/11/2019, appears compensated  5. Inguinal hernia      Recommendations:  1. Patient did have mild elevation of troponins however they do appear to be flat and are likely representative of ischemic demand versus acute kidney injury, patient's admitting creatinine was 1.32.  EKG showed ST elevation and Q waves in the anterior leads with no reciprocal changes and no active chest pain, this is likely chronic and due to an old MI.  Echocardiogram shows decreased LVEF of 36 to 40% and akinetic left ventricle.  There is no earlier echocardiogram for comparison.  This does not seem to be a new finding for the patient as he is on Entresto already.  Although not noted in the record, the patient likely has history of coronary artery disease or perhaps even a past diagnosis/treatment of MI, he is on both aspirin and Plavix at home.    2. Recommend medical management.  Patient is on guideline directed medical therapy for coronary artery disease and ischemic cardiomyopathy including aspirin, Plavix, atorvastatin, Lopressor, and Entresto.  3. With regard to patient's diastolic heart failure, he does appear compensated at this time.  BNP elevation may be falsely elevated due to elevated creatinine.  Will continue to monitor patient and adjust medications as needed.  His creatinine has now recovered.      I discussed the patients findings and my recommendations with patient and family    Sofya Damon, APRN  11/11/19  5:39 PM    Addendum:

## 2019-11-11 NOTE — PROGRESS NOTES
[facility] HOSPITALIST PROGRESS NOTE     Patient Identification:  Name:  Demarcus Pastor  Age:  86 y.o.  Sex:  male  :  1933  MRN:  [id number]  Visit Number:  [id number]  ROOM: [address] [place] [address]     Primary Care Provider:  Marcus Adames MD    Length of stay:  1    Subjective        Chief Compliant Follow up for the abdominal pain    Patient seen and examined with the RN Sal at the bedside. Patient currently complaints of abdominal pain. History is limited because of the aphasia. Answers mostly in yes and no. Denies chest pain. Had dulcolax supp and had BM after that. Also noted to have urinary rentention so urology consulted and the portillo catheter placed. Afebrile and no events overnight.       Objective     Current Hospital Meds:  aspirin 81 mg Oral Daily   atorvastatin 40 mg Oral Daily   bisacodyl 10 mg Rectal Daily   clopidogrel 75 mg Oral Daily   finasteride 5 mg Oral Daily   heparin (porcine) 5,000 Units Subcutaneous Q12H   [START ON 2019] hydrOXYzine 25 mg Oral Nightly   metoprolol tartrate 12.5 mg Oral BID   pantoprazole 40 mg Oral Nightly   potassium chloride 10 mEq Intravenous Q1H   sacubitril-valsartan 1 tablet Oral Daily   sodium chloride 10 mL Intravenous Q12H   tamsulosin 0.4 mg Oral Daily   thiamine 100 mg Oral Daily     sodium chloride 0.9 % with KCl 20 mEq 75 mL/hr Last Rate: 75 mL/hr (19 1153)     ----------------------------------------------------------------------------------------------------------------------  Vital Signs:  Temp:  [96.9 °F (36.1 °C)-98 °F (36.7 °C)] 97.6 °F (36.4 °C)  Heart Rate:  [54-95] 90  Resp:  [18-22] 18  BP: (100-162)/() 126/84  SpO2:  [94 %-98 %] 97 %  on  Flow (L/min):  [2] 2;   Device (Oxygen Therapy): nasal cannula  Body mass index is 19.71 kg/m².    Wt Readings from Last 3 Encounters:   19 57 kg (125 lb 9.6 oz)   18 63.5 kg (140 lb)   16 58 kg (127 lb 12.8 oz)       Intake/Output Summary (Last 24 hours) at  11/11/2019 1743  Last data filed at 11/11/2019 0900  Gross per 24 hour   Intake --   Output 200 ml   Net -200 ml     Diet Clear Liquid  ----------------------------------------------------------------------------------------------------------------------  Physical exam:  General: Comfortable, Not in distress.  Well-developed and well-nourished.   HENT:  Head:  Normocephalic and atraumatic.  Mouth:  Moist mucous membranes.    Eyes:  Conjunctivae and EOM are normal.  Pupils are equal, round, and reactive to light.  No scleral icterus.    Neck:  Neck supple.  No JVD present.    Cardiovascular:  Normal rate, regular rhythm with no murmur.  Pulmonary/Chest:  No respiratory distress, no wheezes, no crackles, with normal breath sounds and good air movement.  Abdomen:  Soft.  Bowel sounds are normal.  No distension and mild diffuse tenderness.   Musculoskeletal:  No edema, no tenderness, and no deformity.  No red or swollen joints anywhere.    Neurological:  Alert.  No cranial nerve deficit. No focal deficits. No facial droop.  Aphasia +  Skin:  Skin is warm and dry. No rash noted. No pallor.   Peripheral vascular:  pulses in all 4 extremities with no clubbing, no cyanosis, no edema.  Genitourinary: portillo +  ----------------------------------------------------------------------------------------------------------------------  ----------------------------------------------------------------------------------------------------------------------Results from last 7 days   Lab Units 11/11/19  1607 11/11/19  1042 11/11/19  0106 11/10/19  2208  11/10/19  1724   LACTATE mmol/L 2.3* 2.5*  --  2.1*   < >  --    WBC 10*3/mm3  --   --  8.24  --   --  6.13   HEMOGLOBIN g/dL  --   --  14.8  --   --  17.0   HEMATOCRIT %  --   --  44.1  --   --  49.9   MCV fL  --   --  92.5  --   --  90.1   MCHC g/dL  --   --  33.6  --   --  34.1   PLATELETS 10*3/mm3  --   --  231  --   --  290   INR   --   --   --   --   --  0.92    < > = values in this  interval not displayed.     Results from last 7 days   Lab Units 11/11/19  0106 11/10/19  1724   SODIUM mmol/L 144 142   POTASSIUM mmol/L 3.3* 3.3*   MAGNESIUM mg/dL  --  2.0   CHLORIDE mmol/L 108* 104   CO2 mmol/L 24.7 22.0   BUN mg/dL 10 12   CREATININE mg/dL 1.16 1.32*   EGFR IF NONAFRICN AM mL/min/1.73 60* 51*   CALCIUM mg/dL 8.7 9.7   GLUCOSE mg/dL 97 131*   ALBUMIN g/dL 3.06* 3.97   BILIRUBIN mg/dL 0.7 0.7   ALK PHOS U/L 53 70   AST (SGOT) U/L 24 32   ALT (SGPT) U/L 16 22   Estimated Creatinine Clearance: 36.9 mL/min (by C-G formula based on SCr of 1.16 mg/dL).  Results from last 7 days   Lab Units 11/11/19  1042 11/11/19  0730 11/11/19  0106  11/10/19  1724   CK TOTAL U/L  --   --   --   --  144   TROPONIN T ng/mL 0.058* 0.050* 0.041*   < > 0.045*    < > = values in this interval not displayed.     No results found for: HGBA1C, POCGLU  No results found for: AMMONIA  Results from last 7 days   Lab Units 11/11/19  0730   CHOLESTEROL mg/dL 90   TRIGLYCERIDES mg/dL 62   HDL CHOL mg/dL 33*   LDL CHOL mg/dL 45     Results from last 7 days   Lab Units 11/10/19  2037   NITRITE UA  Negative   WBC UA /HPF 0-2   BACTERIA UA /HPF None Seen   SQUAM EPITHEL UA /HPF 0-2             I have personally looked at the labs and they are summarized above.  ----------------------------------------------------------------------------------------------------------------------  Imaging Results (Last 24 Hours)     Procedure Component Value Units Date/Time    XR Abdomen KUB [[id number]] Updated:  11/11/19 1438    US Scrotum & Testicles [[id number]] Collected:  11/10/19 1916     Updated:  11/10/19 1918    Narrative:       US Scrotum and Contents    INDICATION:  Left-sided groin and scrotal pain. Suspected left-sided hernia    TECHNIQUE:   Sonographic evaluation of the scrotum. Color and pulsed wave doppler ultrasound was used to assess testicular vasculature.    COMPARISON:   CT abdomen and pelvis 11/7/2016 and 11/10/2019    FINDINGS:  The  right testicle is 2.6 x 4.1 x 2.5 cm. It is normal in appearance with some dilated rete testes visible. There is a cyst superior to the testicle consistent with an epididymal cyst. It measures 2.7 cm in diameter. This is a simple cyst. There is  normal flow in the right testicle. Left testicle measures 2.2 x 3.8 x 3.1 cm and has normal flow and normal echotexture. The left groin was evaluated and some hypoechoic longitudinally oriented material is visible. The CT scan done approximately same  time may show some fat within the inguinal canal hernia. No bowel involvement is noted.      Impression:       Both testicles are normal    2.7 cm simple right epididymal head cyst    The ultrasound suggests that there is some fatty material in the inguinal canal with different echotexture than the adjacent fatty tissue and on the CT scan there may be a small amount of fat extending down the inguinal canal to the patient may have a  small fat-containing inguinal hernia which appears unchanged from the 2016 CT scan.    Please note that the initial images on the study are incorrectly labeled right groin when they actually represent the left groin according to the technologist. She is going to re-labeled those images    Signer Name: Oleg Garcia MD   Signed: 11/10/2019 7:16 PM   Workstation Name: [facility]    [facility]    XR Chest 1 View [[id number]] Collected:  11/10/19 1839     Updated:  11/10/19 1842    Narrative:       CR Chest 1 Vw    INDICATION:   Chest pain     COMPARISON:    Chest x-ray 11/7/2016    FINDINGS:  Single portable AP view(s) of the chest.  Postoperative changes left upper quadrant. Cardiac silhouette size is top normal. Atherosclerotic carotid vascular calcification noted at the aortic knob. The thoracic aorta is at least ectatic and the patient is  mildly progressed from the prior study. There is some bibasilar atelectasis and mild prominence of interstitial markings in general. No  pneumothorax.      Impression:         1. Ectasia of the thoracic aorta mildly progressed from prior study.  2. Mild bibasilar atelectasis and prominence of interstitial markings in general.    Signer Name: Dorothy Coronado MD   Signed: 11/10/2019 6:39 PM   Workstation Name: [email or web address]    [facility]    CT Abdomen Pelvis Without Contrast [[id number]] Collected:  11/10/19 1838     Updated:  11/10/19 1840    Narrative:       INDICATION:   Concern for strangulated inguinal hernia. Patient is unable to provide a history.    TECHNIQUE:   CT of the abdomen and pelvis without contrast. Coronal and sagittal reconstructions were obtained.  Radiation dose reduction techniques included automated exposure control or exposure modulation based on body size. Radiation audit for number of CT and  nuclear cardiology exams performed in the last year: 0.      COMPARISON:   Abdomen pelvis CT scan 11/2/2016.    FINDINGS:  Lung bases: Dependent atelectasis. There are again densities left lung base/chest wall consistent with old surgical clips. This is unchanged and results in some artifact. There are coronary artery calcifications. There is a small hiatal hernia.    Abdomen: Lack of intravenous contrast media limits the study.    There is a streak artifact since the patient is unable to raise his arms. Intrahepatic biliary air is present as is air in the common bile duct. Please correlate for history of sphincterotomy. The patient is postcholecystectomy. The noncontrast liver is  otherwise grossly unremarkable allowing for the artifact from the arms. There is apparently been a splenectomy previously. The right adrenal gland is unremarkable. There is stable thickening of the left adrenal gland. There is no intrarenal calculus or  hydronephrosis. There are vascular calcifications involving the abdominal aorta with ectasia but no abdominal aortic aneurysm. 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  Noncontrast pancreas is not well seen and likely atrophic distally.    Visualized appendix is unremarkable.    There are multiple air-fluid levels within prominent small bowel loops but there is no definite transition point. Etiology not clear. This could reflect mild ileus. Early or low-grade obstruction not excluded. Follow-up recommended.    There are degenerative changes in the lumbar spine. There is no percutaneously drainable fluid collection or free intraperitoneal air appreciated.    Pelvis: Prostate gland is large measuring about 6 cm diameter. There are subtle densities dependently in the bladder concerning for small stones. Stones were also seen previously.    There is a large amount of gas and stool in the rectum and there is sigmoid diverticulosis. There is no CT evidence for acute diverticulitis.     There are fat-containing inguinal hernias bilaterally. There is no bowel appreciated within either the right or left inguinal hernia. Sigmoid colon protrudes to the orifice of the left inguinal hernia area      Impression:         1. There are fat-containing inguinal hernias bilaterally. There is no evidence for incarcerated bowel.  2. Multiple air-fluid levels within prominent small bowel loops. No transition point is appreciated. This is quite nonspecific and likely related to ileus. Early or low-grade obstruction not excluded. Follow-up recommended.  3. Please note the study is limited by technical factors and lack of contrast media. Prostate gland is enlarged and small bladder calculi are again seen.  5. Patient's post cholecystectomy and there is biliary air probably due to prior sphincterotomy. Please confirm clinically.  6. Post splenectomy.  7. Sigmoid diverticulosis without evidence for diverticulitis.  8. Atherosclerotic vascular disease.        Signer Name: Dorothy Coronado MD   Signed: 11/10/2019 6:38 PM   Workstation Name:  [facility]    [facility] have personally reviewed the radiology images and read the final radiology report.    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 continue with ASA, plavix statin and lopressor. Cardiology consulted. 2D echo with EF of 36-40%, likely chronic since on entresto at home. Chart review done and no PCP or consultation note found. Check lipids profile.     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    Abdominal pain likely multifactorial because of Ileus and urinary retention.For Ileus, will get xray KUB and on dulcolax supp.  Inguinal Hernia, no work up needed now. Had bm.     Acute Urinary retention with BPH, s/p Bajwa. Urology consulted and started on proscar.     Lactic acidosis, monitor. Trending down. Etiology unknown. DC IVF since EF of 36% and elevated BNP. Afebrile and VSS. UA negative.     Hypokalemia, replace and monitor.     History of cerebrovascular accident with expressive aphasia, continue with ASA, plavix and statin.     DIET: Clear liquid diet.     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

## 2019-11-11 NOTE — NURSING NOTE
Consult received for pressure injury  No evidence of pressure injury this am.  Wound to left ear is clean and dry   Dry intact brown scab to left ankle  Pressure injury prevention is in place.

## 2019-11-11 NOTE — ED NOTES
Patient has complained of left lower side pain today from the nursing home. Upon arrival patient had started to have chest pain. Patient has large swollen area to lower left side with tenderness. Patient has a history of strokes and expressive  Aphasia. Patient is alert and aware at this time.     Santo Cummings RN  11/10/19 1906       Santo Cummings RN  11/10/19 1914

## 2019-11-11 NOTE — CONSULTS
Referring Provider:  Marcus Adames MD       Chief complaint abdominal pain    Subjective     Patient is a 86 y.o. male presents with abdominal pain and expressive a aphasia.  Patient is unable to give an adequate history due to his stroke caused expressive aphasia.  The nurses were unable to get a catheter in and the patient was scanned for over 200 cc.  Urological consultation was obtained for this patient of retention.      Review of Systems   Pertinent items are noted in HPI, all other systems reviewed and negative    History  Past Medical History:   Diagnosis Date   • Abnormal CT of the abdomen 6/19/2016   • Colitis 6/19/2016   • CVA (cerebral vascular accident) (CMS/HCC)    • History of transfusion    • Hyperlipidemia    • Hypertension    • Pancreatitis      Past Surgical History:   Procedure Laterality Date   • COLONOSCOPY N/A 11/9/2016    Procedure: COLONOSCOPY CPT CODE: 06199;  Surgeon: Lilliam Wilson DO;  Location: [facility] OR;  Service:    • ENDOSCOPY N/A 6/17/2016    Procedure: ESOPHAGOGASTRODUODENOSCOPY;  Surgeon: Alex Robles III, MD;  Location: [facility] OR;  Service:    • ENDOSCOPY N/A 11/9/2016    Procedure: ESOPHAGOGASTRODUODENOSCOPY WITH BIOPSY CPT CODE: 35994;  Surgeon: Lilliam Wilson DO;  Location: [facility] OR;  Service:    • ESOPHAGEAL DILATATION       Family History   Problem Relation Age of Onset   • Stroke Mother    • Heart disease Brother      Social History     Tobacco Use   • Smoking status: Never Smoker   • Smokeless tobacco: Never Used   Substance Use Topics   • Alcohol use: No   • Drug use: No     Medications Prior to Admission   Medication Sig Dispense Refill Last Dose   • aspirin 81 MG EC tablet Take 81 mg by mouth Daily.   11/10/2019 at 0800   • atorvastatin (LIPITOR) 40 MG tablet Take 40 mg by mouth Daily.   11/9/2019 at 2000   • clopidogrel (PLAVIX) 75 MG tablet Take 75 mg by mouth Daily.   11/10/2019 at 0800   • hydrOXYzine (ATARAX) 25 MG tablet Take 25 mg by  mouth Every Night.   11/9/2019 at 2000   • metoprolol tartrate (LOPRESSOR) 25 MG tablet Take 12.5 mg by mouth 2 (Two) Times a Day.   11/10/2019 at 1600   • pantoprazole (PROTONIX) 40 MG EC tablet Take 40 mg by mouth Every Night.   11/9/2019 at 2000   • sacubitril-valsartan (ENTRESTO) 24-26 MG tablet Take 1 tablet by mouth Daily.   11/10/2019 at 0800   • thiamine (VITAMIN B-1) 100 MG tablet Take 100 mg by mouth Daily.   11/10/2019 at 0800     Allergies:  Penicillins    Objective     Vital Signs  Temp:  [96.9 °F (36.1 °C)-98 °F (36.7 °C)] 97.6 °F (36.4 °C)  Heart Rate:  [54-95] 90  Resp:  [18-22] 18  BP: (100-162)/() 126/84    Physical Exam:    General Appearance alert, fatigued, cooperative, cachectic and Chronically ill  Head normocephalic, without obvious abnormality and atraumatic  Eyes lids and lashes normal, conjunctivae and sclerae normal, no icterus, no pallor, corneas clear and PERRLA  Neck no adenopathy, supple, trachea midline, no thyromegaly, no carotid bruit and no JVD  Lungs clear to auscultation, respirations regular, respirations even and respirations unlabored  Heart regular rhythm & normal rate, normal S1, S2, no murmur, no gallop, no rub and no click  Abdomen Bilateral inguinal hernias that are reducible and slightly tender  Male Genitalia circumcised  Rectal Sensation Normal, Tone Normal and enlarged  Skin no bleeding, bruising or rash    Results Review:    I reviewed the patient's new clinical results.    Assessment/Plan       Abdominal pain  Procedure the patient had Urojet lidocaine used as a local anesthetic and a 16 Estonian straight catheter was placed without difficulty and drainage bag connected.    I would leave the catheter they placed.  In place until the general surgical problem problems have been addressed.    I discussed the patients findings and my recommendations with patient and nursing staff.     Austin Francisco MD  11/11/19  4:03 PM

## 2019-11-11 NOTE — PLAN OF CARE
Problem: Patient Care Overview  Goal: Plan of Care Review  Outcome: Ongoing (interventions implemented as appropriate)    Goal: Individualization and Mutuality  Outcome: Ongoing (interventions implemented as appropriate)    Goal: Discharge Needs Assessment  Outcome: Ongoing (interventions implemented as appropriate)    Goal: Interprofessional Rounds/Family Conf  Outcome: Ongoing (interventions implemented as appropriate)      Problem: Fall Risk (Adult)  Goal: Identify Related Risk Factors and Signs and Symptoms  Outcome: Ongoing (interventions implemented as appropriate)    Goal: Absence of Fall  Outcome: Ongoing (interventions implemented as appropriate)      Problem: Skin Injury Risk (Adult)  Goal: Identify Related Risk Factors and Signs and Symptoms  Outcome: Ongoing (interventions implemented as appropriate)    Goal: Skin Health and Integrity  Outcome: Ongoing (interventions implemented as appropriate)      Problem: Pain, Chronic (Adult)  Goal: Identify Related Risk Factors and Signs and Symptoms  Outcome: Ongoing (interventions implemented as appropriate)    Goal: Acceptable Pain/Comfort Level and Functional Ability  Outcome: Ongoing (interventions implemented as appropriate)

## 2019-11-11 NOTE — CONSULTS
Consults    Patient Care Team:  Marcus Adames MD as PCP - General (Internal Medicine)    Chief complaint: bilateral inguinal hernias    Subjective     History of Present Illness He is a 87 yo male nursing home resident who is not able to give much history. Apparently he was brought in due to abdominal pain and groin pain. He has had a CVA in the past with some neurologic deficits, and has also had some chest pain this visit. He is to get a stress test this morning. He currently has no abdominal pain nausea or vomiting. He had a CT showing bilateral inguinal hernias with fat in them. He has some diverticulosis as well. His lactated initially was 3.6 but is down to 2.1 today. His troponin T was 0.037. ProBNP was 6,150. CBC was normal.     Review of Systems   Constitutional: Negative for activity change, appetite change, chills and fever.   HENT: Negative for congestion, drooling and rhinorrhea.    Eyes: Negative for pain, discharge and visual disturbance.   Respiratory: Negative for apnea and shortness of breath.    Cardiovascular: Positive for chest pain. Negative for leg swelling.   Gastrointestinal: Positive for abdominal pain. Negative for abdominal distention, anal bleeding, diarrhea, rectal pain and vomiting.   Genitourinary: Negative for difficulty urinating, dysuria and genital sores.   Musculoskeletal: Positive for gait problem. Negative for arthralgias and neck pain.   Neurological: Positive for speech difficulty. Negative for seizures.   Psychiatric/Behavioral: Positive for confusion. Negative for agitation and behavioral problems.        Past Medical History:   Diagnosis Date   • Abnormal CT of the abdomen 6/19/2016   • Colitis 6/19/2016   • CVA (cerebral vascular accident) (CMS/[facility]ly [facility])    • History of transfusion    • Hyperlipidemia    • Hypertension    • Pancreatitis    ,   Past Surgical History:   Procedure Laterality Date   • COLONOSCOPY N/A 11/9/2016    Procedure: COLONOSCOPY CPT CODE: 21465;   Surgeon: Lilliam Wilson DO;  Location:  [facility];  Service:    • ENDOSCOPY N/A 6/17/2016    Procedure: ESOPHAGOGASTRODUODENOSCOPY;  Surgeon: Alex Robles III, MD;  Location:  [facility];  Service:    • ENDOSCOPY N/A 11/9/2016    Procedure: ESOPHAGOGASTRODUODENOSCOPY WITH BIOPSY CPT CODE: 49890;  Surgeon: Lilliam Wilson DO;  Location:  [facility];  Service:    • ESOPHAGEAL DILATATION     ,   Family History   Problem Relation Age of Onset   • Stroke Mother    • Heart disease Brother    ,   Social History     Tobacco Use   • Smoking status: Never Smoker   • Smokeless tobacco: Never Used   Substance Use Topics   • Alcohol use: No   • Drug use: No   ,   Medications Prior to Admission   Medication Sig Dispense Refill Last Dose   • aspirin 81 MG EC tablet Take 81 mg by mouth Daily.   11/10/2019 at 0800   • atorvastatin (LIPITOR) 40 MG tablet Take 40 mg by mouth Daily.   11/9/2019 at 2000   • clopidogrel (PLAVIX) 75 MG tablet Take 75 mg by mouth Daily.   11/10/2019 at 0800   • hydrOXYzine (ATARAX) 25 MG tablet Take 25 mg by mouth Every Night.   11/9/2019 at 2000   • metoprolol tartrate (LOPRESSOR) 25 MG tablet Take 12.5 mg by mouth 2 (Two) Times a Day.   11/10/2019 at 1600   • pantoprazole (PROTONIX) 40 MG EC tablet Take 40 mg by mouth Every Night.   11/9/2019 at 2000   • sacubitril-valsartan (ENTRESTO) 24-26 MG tablet Take 1 tablet by mouth Daily.   11/10/2019 at 0800   • thiamine (VITAMIN B-1) 100 MG tablet Take 100 mg by mouth Daily.   11/10/2019 at 0800   , Scheduled Meds:    aspirin 81 mg Oral Daily   atorvastatin 40 mg Oral Daily   clopidogrel 75 mg Oral Daily   heparin (porcine) 5,000 Units Subcutaneous Q12H   [START ON 11/12/2019] hydrOXYzine 25 mg Oral Nightly   metoprolol tartrate 12.5 mg Oral BID   pantoprazole 40 mg Oral Nightly   sacubitril-valsartan 1 tablet Oral Daily   sodium chloride 10 mL Intravenous Q12H   thiamine 100 mg Oral Daily   , Continuous Infusions:    sodium chloride 0.9 % with KCl  20 mEq 50 mL/hr Last Rate: 50 mL/hr (11/11/19 0142)   , PRN Meds:  nitroglycerin  •  [COMPLETED] Insert peripheral IV **AND** sodium chloride  •  sodium chloride and Allergies:  Penicillins    Objective      Vital Signs  Temp:  [96.9 °F (36.1 °C)-98 °F (36.7 °C)] 97.6 °F (36.4 °C)  Heart Rate:  [54-95] 55  Resp:  [18-22] 18  BP: (100-162)/() 100/62    Physical Exam   Constitutional: He is oriented to person, place, and time. He appears well-developed. He does not appear ill. No distress.       HENT:   Head: Normocephalic. Head is without laceration. Hair is normal.   Right Ear: Hearing and ear canal normal.   Left Ear: Hearing and ear canal normal.   Nose: Nose normal. No sinus tenderness. No epistaxis. Right sinus exhibits no maxillary sinus tenderness and no frontal sinus tenderness. Left sinus exhibits no maxillary sinus tenderness and no frontal sinus tenderness.   Eyes: Conjunctivae and lids are normal. Pupils are equal, round, and reactive to light.   Neck: Normal range of motion. No JVD present. No tracheal tenderness present. No tracheal deviation present. No thyroid mass and no thyromegaly present.   Cardiovascular: Normal rate and regular rhythm. Exam reveals no gallop.   No murmur heard.  Pulmonary/Chest: Effort normal and breath sounds normal. No stridor. He has no wheezes. He exhibits no tenderness.   Abdominal: Soft. Bowel sounds are normal. He exhibits no distension, no ascites and no mass. There is tenderness. There is no rebound and no guarding. A hernia is present.   Musculoskeletal: He exhibits no edema or deformity.   Lymphadenopathy:     He has no cervical adenopathy.     He has no axillary adenopathy.        Right: No inguinal and no supraclavicular adenopathy present.        Left: No inguinal and no supraclavicular adenopathy present.   Neurological: He is alert and oriented to person, place, and time. He exhibits normal muscle tone.   Skin: Skin is warm, dry and intact. No rash noted.  No erythema. No pallor.   Psychiatric: He has a normal mood and affect. His behavior is normal. Thought content normal.   Vitals reviewed.      Results Review:    I reviewed the patient's new clinical results.  I reviewed the patient's new imaging results and agree with the interpretation.  I reviewed the patient's other test results and agree with the interpretation        Assessment/Plan bilateral inguinal hernias that are not incarcerated or emergent. Given his elevated BNP and possible cardiac issues that needs evalluated thuroughly first. His risk for surgery is significant due to the cardiac issues.      Abdominal pain      Assessment & Plan    I discussed the patients findings and my recommendations with patient, nursing staff and consulting provider    Flex Clark MD  11/11/19  9:07 AM    Time:

## 2019-11-12 ENCOUNTER — APPOINTMENT (OUTPATIENT)
Dept: GENERAL RADIOLOGY | Facility: HOSPITAL | Age: 84
End: 2019-11-12

## 2019-11-12 LAB
ANION GAP SERPL CALCULATED.3IONS-SCNC: 11.5 MMOL/L (ref 5–15)
BASOPHILS # BLD AUTO: 0.07 10*3/MM3 (ref 0–0.2)
BASOPHILS NFR BLD AUTO: 0.8 % (ref 0–1.5)
BUN BLD-MCNC: 11 MG/DL (ref 8–23)
BUN/CREAT SERPL: 10.6 (ref 7–25)
CALCIUM SPEC-SCNC: 8.4 MG/DL (ref 8.6–10.5)
CHLORIDE SERPL-SCNC: 110 MMOL/L (ref 98–107)
CO2 SERPL-SCNC: 17.5 MMOL/L (ref 22–29)
CREAT BLD-MCNC: 1.04 MG/DL (ref 0.76–1.27)
D-LACTATE SERPL-SCNC: 0.9 MMOL/L (ref 0.5–2)
DEPRECATED RDW RBC AUTO: 51 FL (ref 37–54)
EOSINOPHIL # BLD AUTO: 0.05 10*3/MM3 (ref 0–0.4)
EOSINOPHIL NFR BLD AUTO: 0.6 % (ref 0.3–6.2)
ERYTHROCYTE [DISTWIDTH] IN BLOOD BY AUTOMATED COUNT: 14.8 % (ref 12.3–15.4)
GFR SERPL CREATININE-BSD FRML MDRD: 68 ML/MIN/1.73
GLUCOSE BLD-MCNC: 94 MG/DL (ref 65–99)
HCT VFR BLD AUTO: 42.9 % (ref 37.5–51)
HGB BLD-MCNC: 14.2 G/DL (ref 13–17.7)
IMM GRANULOCYTES # BLD AUTO: 0.03 10*3/MM3 (ref 0–0.05)
IMM GRANULOCYTES NFR BLD AUTO: 0.4 % (ref 0–0.5)
LYMPHOCYTES # BLD AUTO: 2.07 10*3/MM3 (ref 0.7–3.1)
LYMPHOCYTES NFR BLD AUTO: 24.3 % (ref 19.6–45.3)
MAGNESIUM SERPL-MCNC: 2.2 MG/DL (ref 1.6–2.4)
MCH RBC QN AUTO: 30.9 PG (ref 26.6–33)
MCHC RBC AUTO-ENTMCNC: 33.1 G/DL (ref 31.5–35.7)
MCV RBC AUTO: 93.5 FL (ref 79–97)
MONOCYTES # BLD AUTO: 1 10*3/MM3 (ref 0.1–0.9)
MONOCYTES NFR BLD AUTO: 11.8 % (ref 5–12)
NEUTROPHILS # BLD AUTO: 5.29 10*3/MM3 (ref 1.7–7)
NEUTROPHILS NFR BLD AUTO: 62.1 % (ref 42.7–76)
NRBC BLD AUTO-RTO: 0 /100 WBC (ref 0–0.2)
NT-PROBNP SERPL-MCNC: 5901 PG/ML (ref 5–1800)
PLATELET # BLD AUTO: 225 10*3/MM3 (ref 140–450)
PMV BLD AUTO: 11.4 FL (ref 6–12)
POTASSIUM BLD-SCNC: 4.2 MMOL/L (ref 3.5–5.2)
RBC # BLD AUTO: 4.59 10*6/MM3 (ref 4.14–5.8)
SODIUM BLD-SCNC: 139 MMOL/L (ref 136–145)
WBC NRBC COR # BLD: 8.51 10*3/MM3 (ref 3.4–10.8)

## 2019-11-12 PROCEDURE — 83605 ASSAY OF LACTIC ACID: CPT | Performed by: HOSPITALIST

## 2019-11-12 PROCEDURE — 25010000002 HEPARIN (PORCINE) PER 1000 UNITS: Performed by: INTERNAL MEDICINE

## 2019-11-12 PROCEDURE — 25010000002 MORPHINE PER 10 MG: Performed by: HOSPITALIST

## 2019-11-12 PROCEDURE — 85025 COMPLETE CBC W/AUTO DIFF WBC: CPT | Performed by: HOSPITALIST

## 2019-11-12 PROCEDURE — 94799 UNLISTED PULMONARY SVC/PX: CPT

## 2019-11-12 PROCEDURE — 80048 BASIC METABOLIC PNL TOTAL CA: CPT | Performed by: HOSPITALIST

## 2019-11-12 PROCEDURE — 83880 ASSAY OF NATRIURETIC PEPTIDE: CPT | Performed by: HOSPITALIST

## 2019-11-12 PROCEDURE — 74018 RADEX ABDOMEN 1 VIEW: CPT | Performed by: RADIOLOGY

## 2019-11-12 PROCEDURE — 83735 ASSAY OF MAGNESIUM: CPT | Performed by: HOSPITALIST

## 2019-11-12 PROCEDURE — 99232 SBSQ HOSP IP/OBS MODERATE 35: CPT | Performed by: HOSPITALIST

## 2019-11-12 PROCEDURE — 99232 SBSQ HOSP IP/OBS MODERATE 35: CPT | Performed by: NURSE PRACTITIONER

## 2019-11-12 PROCEDURE — 74018 RADEX ABDOMEN 1 VIEW: CPT

## 2019-11-12 PROCEDURE — 99231 SBSQ HOSP IP/OBS SF/LOW 25: CPT | Performed by: SURGERY

## 2019-11-12 PROCEDURE — 25010000002 FUROSEMIDE PER 20 MG: Performed by: HOSPITALIST

## 2019-11-12 RX ORDER — FUROSEMIDE 10 MG/ML
20 INJECTION INTRAMUSCULAR; INTRAVENOUS ONCE
Status: COMPLETED | OUTPATIENT
Start: 2019-11-12 | End: 2019-11-12

## 2019-11-12 RX ADMIN — SODIUM CHLORIDE, PRESERVATIVE FREE 10 ML: 5 INJECTION INTRAVENOUS at 21:13

## 2019-11-12 RX ADMIN — MORPHINE SULFATE 0.5 MG: 2 INJECTION, SOLUTION INTRAMUSCULAR; INTRAVENOUS at 16:51

## 2019-11-12 RX ADMIN — Medication 100 MG: at 07:57

## 2019-11-12 RX ADMIN — TAMSULOSIN HYDROCHLORIDE 0.4 MG: 0.4 CAPSULE ORAL at 07:57

## 2019-11-12 RX ADMIN — SACUBITRIL AND VALSARTAN 1 TABLET: 24; 26 TABLET, FILM COATED ORAL at 07:57

## 2019-11-12 RX ADMIN — MORPHINE SULFATE 0.5 MG: 2 INJECTION, SOLUTION INTRAMUSCULAR; INTRAVENOUS at 21:12

## 2019-11-12 RX ADMIN — METOPROLOL TARTRATE 12.5 MG: 25 TABLET, FILM COATED ORAL at 21:12

## 2019-11-12 RX ADMIN — MORPHINE SULFATE 0.5 MG: 2 INJECTION, SOLUTION INTRAMUSCULAR; INTRAVENOUS at 13:01

## 2019-11-12 RX ADMIN — HEPARIN SODIUM 5000 UNITS: 5000 INJECTION INTRAVENOUS; SUBCUTANEOUS at 21:12

## 2019-11-12 RX ADMIN — FUROSEMIDE 20 MG: 10 INJECTION, SOLUTION INTRAMUSCULAR; INTRAVENOUS at 11:17

## 2019-11-12 RX ADMIN — HEPARIN SODIUM 5000 UNITS: 5000 INJECTION INTRAVENOUS; SUBCUTANEOUS at 07:56

## 2019-11-12 RX ADMIN — METOPROLOL TARTRATE 12.5 MG: 25 TABLET, FILM COATED ORAL at 07:58

## 2019-11-12 RX ADMIN — FINASTERIDE 5 MG: 5 TABLET, FILM COATED ORAL at 07:57

## 2019-11-12 RX ADMIN — MORPHINE SULFATE 0.5 MG: 2 INJECTION, SOLUTION INTRAMUSCULAR; INTRAVENOUS at 08:12

## 2019-11-12 RX ADMIN — CLOPIDOGREL 75 MG: 75 TABLET, FILM COATED ORAL at 07:57

## 2019-11-12 RX ADMIN — ASPIRIN 81 MG: 81 TABLET, COATED ORAL at 07:57

## 2019-11-12 RX ADMIN — Medication 10 MG: at 07:56

## 2019-11-12 RX ADMIN — HYDROXYZINE HYDROCHLORIDE 25 MG: 25 TABLET, FILM COATED ORAL at 21:12

## 2019-11-12 RX ADMIN — PANTOPRAZOLE SODIUM 40 MG: 40 TABLET, DELAYED RELEASE ORAL at 21:12

## 2019-11-12 RX ADMIN — MORPHINE SULFATE 0.5 MG: 2 INJECTION, SOLUTION INTRAMUSCULAR; INTRAVENOUS at 02:04

## 2019-11-12 RX ADMIN — ATORVASTATIN CALCIUM 40 MG: 40 TABLET, FILM COATED ORAL at 07:57

## 2019-11-12 NOTE — PROGRESS NOTES
Inguinal hernias    He cannot provide any history, but he did have the eccho yesterday. He had some pain yesterday and it did improve after a portillo catheter was placed. However, he apparently has been pulling on it and it has a little blood. His hernias are soft and it is difficult to tell if his discomfort is from them or something in his abdomen like his bladder or colon. The hernias are completely reduced and have no indication of incarceration. He is tolerating his liquid diet and is advancing today. I think it would be best to follow up outpatient for his hernias as they do not seem to be the obvious problem now.

## 2019-11-12 NOTE — PROGRESS NOTES
LOS: 2 days     Name: Demarcus Pastor  Age/Sex: 86 y.o. male  :  1933        PCP: Marcus Adames MD    Active Problems:    Abdominal pain      Admission Information: Demarcus Pastor is a 86 y.o. male with a past medical history significant for CVA with expressive aphasia, systolic heart failure, hypertension and hyperlipidemia.  He presented to the ED with report of abdominal pain and groin pain.     Chief Complaint:  follow-up elevated troponin, abnormal EKG, and CHF elevated    Interval history: Patient was seen and examined.  He denies chest pain, palpitations.  Patient is not currently wearing O2.      Subjective     Review of Systems   Constitution: Negative for weakness and malaise/fatigue.   Cardiovascular: Negative for chest pain, dyspnea on exertion, irregular heartbeat, leg swelling, near-syncope, orthopnea, palpitations, paroxysmal nocturnal dyspnea and syncope.   Respiratory: Negative for shortness of breath.    Hematologic/Lymphatic: Does not bruise/bleed easily.   Neurological: Negative for dizziness and light-headedness.       Vital Signs  Vital Signs (last 72 hrs)        07  -  11/10 0659 11/10 0700  -   0659  07  -   0659  07  -   0940   Most Recent    Temp (°F)   96.9 -  98    98.2 -  98.8       98.4 (36.9)    Heart Rate   54 -  95    69 -  90       82    Resp   18 -  22    18 -  20       20    BP   100/62 -  162/98    126/84 -  151/78       129/60    SpO2 (%)   94 -  98    97 -  99       98        Temp:  [98.2 °F (36.8 °C)-98.8 °F (37.1 °C)] 98.4 °F (36.9 °C)  Heart Rate:  [69-90] 82  Resp:  [18-20] 20  BP: (126-151)/(60-84) 129/60  Body mass index is 18.87 kg/m².      Intake/Output Summary (Last 24 hours) at 2019 0940  Last data filed at 2019 0839  Gross per 24 hour   Intake 360 ml   Output 675 ml   Net -315 ml       Physical Exam   Constitutional: He appears well-developed and well-nourished.   Neck: No JVD present. Carotid bruit is not  present.   Cardiovascular: Normal rate and regular rhythm.   No lower extremity edema   Pulmonary/Chest: Effort normal and breath sounds normal. No respiratory distress. He has no wheezes. He has no rales.   Abdominal: Soft. Bowel sounds are normal. He exhibits no distension. There is no tenderness.   Neurological: He is alert.   Skin: Skin is warm and dry.   Psychiatric: He has a normal mood and affect. Cognition and memory are normal.   Vitals reviewed.      Telemetry:  Sinus 60-70s       Results Review:     Results from last 7 days   Lab Units 11/12/19  0327 11/11/19  0106 11/10/19  1724   WBC 10*3/mm3 8.51 8.24 6.13   HEMOGLOBIN g/dL 14.2 14.8 17.0   PLATELETS 10*3/mm3 225 231 290     Results from last 7 days   Lab Units 11/12/19  0327 11/11/19  2051 11/11/19  0106 11/10/19  1724   SODIUM mmol/L 139  --  144 142   POTASSIUM mmol/L 4.2 3.5 3.3* 3.3*   CHLORIDE mmol/L 110*  --  108* 104   CO2 mmol/L 17.5*  --  24.7 22.0   BUN mg/dL 11  --  10 12   CREATININE mg/dL 1.04  --  1.16 1.32*   CALCIUM mg/dL 8.4*  --  8.7 9.7   GLUCOSE mg/dL 94  --  97 131*     Results from last 7 days   Lab Units 11/11/19  1042 11/11/19  0730 11/11/19  0106 11/10/19  1940 11/10/19  1724   CK TOTAL U/L  --   --   --   --  144   TROPONIN T ng/mL 0.058* 0.050* 0.041* 0.037* 0.045*       Results from last 7 days   Lab Units 11/10/19  1724   INR  0.92       I reviewed the patient's new clinical results.  I reviewed the patient's new imaging results and agree with the interpretation.  I personally viewed and interpreted the patient's EKG/Telemetry data      Medication Review:     aspirin 81 mg Oral Daily   atorvastatin 40 mg Oral Daily   bisacodyl 10 mg Rectal Daily   clopidogrel 75 mg Oral Daily   finasteride 5 mg Oral Daily   heparin (porcine) 5,000 Units Subcutaneous Q12H   hydrOXYzine 25 mg Oral Nightly   metoprolol tartrate 12.5 mg Oral BID   pantoprazole 40 mg Oral Nightly   sacubitril-valsartan 1 tablet Oral Daily   sodium chloride 10  mL Intravenous Q12H   tamsulosin 0.4 mg Oral Daily   thiamine 100 mg Oral Daily          Assessment:  1. Elevated troponin, likely secondary to creatinine elevation.  2. 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    3. History of CAD, stable  4. Ischemic cardiomyopathy with LVEF of 36 to 40% per echocardiogram of 11/11/2019.  Stable.  Compensated.  Patient is on Entresto  5. Inguinal hernia, management per surgery.      Recommendations:  1. Patient is stable from a cardiac standpoint.  He should continue guideline directed medical management for coronary artery disease and ischemic cardiomyopathy.  These medications include aspirin, Plavix, atorvastatin, Lopressor and Entresto.  2. We will sign off, if needed further please call.    I discussed the patients findings and my recommendations with patient and family    Sofya Damon, APRN  11/12/19  9:40 AM    Addendum:

## 2019-11-12 NOTE — PLAN OF CARE
Problem: Patient Care Overview  Goal: Plan of Care Review  Outcome: Ongoing (interventions implemented as appropriate)    Goal: Individualization and Mutuality  Outcome: Ongoing (interventions implemented as appropriate)    Goal: Discharge Needs Assessment  Outcome: Ongoing (interventions implemented as appropriate)    Goal: Interprofessional Rounds/Family Conf  Outcome: Ongoing (interventions implemented as appropriate)      Problem: Fall Risk (Adult)  Goal: Identify Related Risk Factors and Signs and Symptoms  Outcome: Ongoing (interventions implemented as appropriate)      Problem: Skin Injury Risk (Adult)  Goal: Identify Related Risk Factors and Signs and Symptoms  Outcome: Ongoing (interventions implemented as appropriate)      Problem: Pain, Chronic (Adult)  Goal: Identify Related Risk Factors and Signs and Symptoms  Outcome: Ongoing (interventions implemented as appropriate)

## 2019-11-12 NOTE — PROGRESS NOTES
[facility] HOSPITALIST PROGRESS NOTE     Patient Identification:  Name:  Demarcus Pastor  Age:  86 y.o.  Sex:  male  :  1933  MRN:  [id number]  Visit Number:  [id number]  ROOM: [address] [place] [address]     Primary Care Provider:  Marcus Adames MD    Length of stay:  2    Subjective        Chief Compliant Follow up for the abdominal pain    Patient seen and examined with the RN Sal at the bedside. Patient currently awake, alert and comfortable. Denies abdominal pain, chest pain and shortness of breath. No nausea and vomiting noted. History is limited because of the aphasia. Answers mostly in yes and no. Denies chest pain. Picking on the portillo catheter. Noted to have hematuria, improving.  Afebrile and no events overnight. On 2L NC.       Objective     Current Hospital Meds:    aspirin 81 mg Oral Daily   atorvastatin 40 mg Oral Daily   bisacodyl 10 mg Rectal Daily   clopidogrel 75 mg Oral Daily   finasteride 5 mg Oral Daily   heparin (porcine) 5,000 Units Subcutaneous Q12H   hydrOXYzine 25 mg Oral Nightly   metoprolol tartrate 12.5 mg Oral BID   pantoprazole 40 mg Oral Nightly   sacubitril-valsartan 1 tablet Oral Daily   sodium chloride 10 mL Intravenous Q12H   tamsulosin 0.4 mg Oral Daily   thiamine 100 mg Oral Daily      ----------------------------------------------------------------------------------------------------------------------  Vital Signs:  Temp:  [98.2 °F (36.8 °C)-98.8 °F (37.1 °C)] 98.5 °F (36.9 °C)  Heart Rate:  [69-97] 97  Resp:  [18-20] 19  BP: (123-151)/(60-78) 127/75  SpO2:  [97 %-99 %] 98 %  on  Flow (L/min):  [2] 2;   Device (Oxygen Therapy): nasal cannula  Body mass index is 18.87 kg/m².    Wt Readings from Last 3 Encounters:   19 54.5 kg (120 lb 3.2 oz)   18 63.5 kg (140 lb)   16 58 kg (127 lb 12.8 oz)       Intake/Output Summary (Last 24 hours) at 2019 1436  Last data filed at 2019 0839  Gross per 24 hour   Intake 360 ml   Output 675 ml   Net -315  ml     Diet Soft Texture; Ground  ----------------------------------------------------------------------------------------------------------------------  Physical exam:  General: Comfortable, Not in distress.  Well-developed and well-nourished.   HENT:  Head:  Normocephalic and atraumatic.  Mouth:  Moist mucous membranes.    Eyes:  Conjunctivae and EOM are normal.  Pupils are equal, round, and reactive to light.  No scleral icterus.    Neck:  Neck supple.  No JVD present.  trachea midline.   Cardiovascular:  Normal rate, regular rhythm with no murmur.  Pulmonary/Chest:  No respiratory distress, no wheezes, no crackles, with coarse breath sounds and good air movement.  Abdomen:  Soft.  Bowel sounds are normal.  No distension and no tenderness.   Musculoskeletal:  No edema, no tenderness, and no deformity.  No red or swollen joints anywhere.    Neurological:  Alert.  No cranial nerve deficit. No focal deficits. No facial droop.  Aphasia +  Skin:  Skin is warm and dry. No rash noted. No pallor.   Peripheral vascular:  pulses in all 4 extremities with no clubbing, no cyanosis, no edema.  Genitourinary: portillo + with hematuria  ----------------------------------------------------------------------------------------------------------------------  ----------------------------------------------------------------------------------------------------------------------  Results from last 7 days   Lab Units 11/12/19  0327 11/11/19  2051 11/11/19  1607  11/11/19  0106  11/10/19  1724   LACTATE mmol/L 0.9 2.5* 2.3*   < >  --    < >  --    WBC 10*3/mm3 8.51  --   --   --  8.24  --  6.13   HEMOGLOBIN g/dL 14.2  --   --   --  14.8  --  17.0   HEMATOCRIT % 42.9  --   --   --  44.1  --  49.9   MCV fL 93.5  --   --   --  92.5  --  90.1   MCHC g/dL 33.1  --   --   --  33.6  --  34.1   PLATELETS 10*3/mm3 225  --   --   --  231  --  290   INR   --   --   --   --   --   --  0.92    < > = values in this interval not displayed.     Results  from last 7 days   Lab Units 11/12/19  0327 11/11/19  2051 11/11/19  0106 11/10/19  1724   SODIUM mmol/L 139  --  144 142   POTASSIUM mmol/L 4.2 3.5 3.3* 3.3*   MAGNESIUM mg/dL 2.2  --   --  2.0   CHLORIDE mmol/L 110*  --  108* 104   CO2 mmol/L 17.5*  --  24.7 22.0   BUN mg/dL 11  --  10 12   CREATININE mg/dL 1.04  --  1.16 1.32*   EGFR IF NONAFRICN AM mL/min/1.73 68  --  60* 51*   CALCIUM mg/dL 8.4*  --  8.7 9.7   GLUCOSE mg/dL 94  --  97 131*   ALBUMIN g/dL  --   --  3.06* 3.97   BILIRUBIN mg/dL  --   --  0.7 0.7   ALK PHOS U/L  --   --  53 70   AST (SGOT) U/L  --   --  24 32   ALT (SGPT) U/L  --   --  16 22   Estimated Creatinine Clearance: 39.3 mL/min (by C-G formula based on SCr of 1.04 mg/dL).  Results from last 7 days   Lab Units 11/11/19  1042 11/11/19  0730 11/11/19  0106  11/10/19  1724   CK TOTAL U/L  --   --   --   --  144   TROPONIN T ng/mL 0.058* 0.050* 0.041*   < > 0.045*    < > = values in this interval not displayed.     No results found for: HGBA1C, POCGLU  No results found for: AMMONIA  Results from last 7 days   Lab Units 11/11/19  0730   CHOLESTEROL mg/dL 90   TRIGLYCERIDES mg/dL 62   HDL CHOL mg/dL 33*   LDL CHOL mg/dL 45     Results from last 7 days   Lab Units 11/10/19  2037   NITRITE UA  Negative   WBC UA /HPF 0-2   BACTERIA UA /HPF None Seen   SQUAM EPITHEL UA /HPF 0-2             I have personally looked at the labs and they are summarized above.  ----------------------------------------------------------------------------------------------------------------------  Imaging Results (Last 24 Hours)     Procedure Component Value Units Date/Time    XR Abdomen KUB [[id number]] Collected:  11/12/19 1136     Updated:  11/12/19 1145    Narrative:       EXAMINATION: XR ABDOMEN KUB-      TECHNIQUE: Single KUB     CLINICAL INDICATION:     abdominal pain; R10.9-Unspecified abdominal  pain; R07.9-Chest pain, unspecified; E87.2-Acidosis; R79.89-Other  specified abnormal findings of blood chemistry       COMPARISON:    11/11/2019     FINDINGS:    There are no calcifications projecting over the kidneys or along the  expected course of the ureters.  Bowel gas pattern is nonspecific and nonobstructive in appearance.   Scattered fecal material seen throughout colon.  The bony structures are unremarkable.  No definite radiographic evidence of soft tissue mass.  Bajwa catheter projects in the region of the lower pelvis.       Impression:       Nonobstructive bowel gas pattern. Gas is seen to level of rectum.     This report was finalized on 11/12/2019 11:36 AM by Dr. Best Sosa MD.       XR Abdomen KUB [[id number]] Collected:  11/11/19 1822     Updated:  11/11/19 1825    Narrative:       EXAMINATION: XR ABDOMEN KUB-      TECHNIQUE: Single KUB     CLINICAL INDICATION:     Ileus; R10.9-Unspecified abdominal pain;  R07.9-Chest pain, unspecified; E87.2-Acidosis; R79.89-Other specified  abnormal findings of blood chemistry      COMPARISON:    None available.     FINDINGS:    There are no calcifications projecting over the kidneys or along the  expected course of the ureters.  Bowel gas pattern is nonspecific and nonobstructive in appearance.   Scattered fecal material seen throughout colon.  The bony structures are unremarkable.  No definite radiographic evidence of soft tissue mass.  Bajwa catheter projects in the lower pelvis.       Impression:       Nonobstructive bowel gas pattern. Scattered fecal material is noted.     This report was finalized on 11/11/2019 6:23 PM by Dr. Best Soas MD.           I have personally reviewed the radiology images and read the final radiology report.    Assessment & Plan      Assessment:  Mild Elevated troponin  CMP with EF of 36-40%  Abdominal pain  Ileus  Acute Urinary retention with BPH  Lactic acidosis  Hypokalemia  Inguinal Hernia  History of cerebrovascular accident with expressive aphasia    Plan:  Mild Elevated troponin, troponin level trended and flat trend. continue with  ASA, plavix statin and lopressor. Cardiology on board. No further work up since no chest pain. 2D echo with EF of 36-40%, likely chronic since on entresto at home. Chart review done and no PCP or consultation note found.     CMP with EF of 36-40%, likely ischemic since the patient is on plavix at home. continue with lopressor and Entresto. No previous echo available to compare. BNP elevated so will do one dose of iv lasix. Not on diuretics at home.     Abdominal pain likely multifactorial because of Ileus and urinary retention. Resolved. Ileus resolved on the repeat xray KUB today. on dulcolax supp.  Had bm. Inguinal Hernia, no work up needed now per Dr. Clark.     Acute Urinary retention with BPH, s/p Bajwa. Urology consulted and started on proscar. Hematuria noted. Hgb stable. Monitor.    Lactic acidosis, resolved. Etiology unknown. Afebrile and VSS. UA negative.     Hypokalemia, resolved.     History of cerebrovascular accident with expressive aphasia, continue with ASA, plavix and statin.     DIET: advance to soft diet.  AAT. Up with assistance.     DC to NH in am if stable.     Management and the plans discussed in detail with the patient and and the RN    The patient is high risk due to the following diagnoses/reasons:    Mild Elevated troponin, CMP with EF of 36-40%, Abdominal pain  Ileus, Acute Urinary retention with BPH, Lactic acidosis    Nabila Lassiter MD  11/12/19  2:36 PM

## 2019-11-12 NOTE — PROGRESS NOTES
Discharge Planning Assessment   Drew     Patient Name: Demarcus Pastor  MRN: [id number]  Today's Date: 11/12/2019    Admit Date: 11/10/2019        Discharge Plan     Row Name 11/12/19 1608       Plan    Plan  SS [facility] with [facility] who stated pt's bed was available and could be readmitted in am.  Pt's son aware and agreeable.  SS will follow up in am.         Destination      Service Provider Request Status Selected Services Address Phone Number Fax Number    [facility] Pending - Request Sent N/A [address] [place] [id number] [phone]           KAILEE Ho

## 2019-11-12 NOTE — DISCHARGE PLACEMENT REQUEST
"Laurel Pastor (86 y.o. Male)     Date of Birth Social Security Number Address Home Phone MRN    05/11/1933  [address] [place] [address] [phone] [id number]    Church Marital Status          Ep[employer]        Admission Date Admission Type Admitting Provider Attending Provider Department, Room/Bed    11/10/19 Emergency Mallorie Oliver DO Srinivas, Priyanka, MD [address], 3302/1S    Discharge Date Discharge Disposition Discharge Destination                       Attending Provider:  Nabila Lassiter MD    Allergies:  Penicillins    Isolation:  None   Infection:  None   Code Status:  No CPR    Ht:  170 cm (66.93\")   Wt:  54.5 kg (120 lb 3.2 oz)    Admission Cmt:  None   Principal Problem:  None                Active Insurance as of 11/10/2019     Primary Coverage     Payor Plan Insurance Group Employer/Plan Group    MEDICARE MEDICARE A & B      Payor Plan Address Payor Plan Phone Number Payor Plan Fax Number Effective Dates    [address] [phone]  5/1/1998 - None Entered    [place] [address]       Subscriber Name Subscriber Birth Date Member ID       LAUREL PASTOR 5/11/1933 [id number]                 Emergency Contacts      (Rel.) Home Phone Work Phone Mobile Phone    Vj Pastor (Son) [phone] -- [phone]            Emergency Contact Information     Name Relation Home Work Mobile    Vj Pastor Son [phone]          Insurance Information                MEDICARE/MEDICARE A & B Phone: [phone]    Subscriber: Laurel Pastor Subscriber#: [id number]    Group#:  Precert#:           Treatment Team     Provider Relationship Specialty Contact    Nabila Lassiter MD Attending -- [phone]    Artis Lan MD Consulting Physician Interventional Cardiology [phone]    Flex Clark MD Consulting Physician, Surgeon General Surgery [phone]    Crystal Marshall Respiratory Therapist -- [phone]    Sal Mclaughlin, " RN Registered Nurse --     Lien Booker Patient Care Technician --     Mj Huerta MD Consulting Physician Urology [phone]    Kanchan Howell Patient Care Technician --           Problem List           Codes Noted - Resolved       Hospital    Abdominal pain ICD-10-CM: R10.9  ICD-9-CM: 789.00 11/10/2019 - Present       Non-Hospital    Anemia ICD-10-CM: D64.9  ICD-9-CM: 285.9 11/10/2016 - Present    Iron deficiency anemia ICD-10-CM: D50.9  ICD-9-CM: 280.9 2016 - Present    Anemia due to vitamin B12 deficiency ICD-10-CM: D51.9  ICD-9-CM: 281.1 2016 - Present    Hypokalemia ICD-10-CM: E87.6  ICD-9-CM: 276.8 2016 - Present    Late onset 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 (CMS/HCC) ICD-10-CM: G30.1, F02.80  ICD-9-CM: 331.0, 294.10 2016 - Present    Malnutrition (CMS/HCC) ICD-10-CM: E46  ICD-9-CM: 263.9 2016 - Present    Colitis ICD-10-CM: K52.9  ICD-9-CM: 558.9 2016 - Present    Abnormal CT of the abdomen ICD-10-CM: R93.5  ICD-9-CM: 793.6 2016 - Present    Elevated LFTs ICD-10-CM: R94.5  ICD-9-CM: 790.6 2016 - Present    Dysphagia ICD-10-CM: R13.10  ICD-9-CM: 787.20 2016 - Present    Esophageal stricture ICD-10-CM: K22.2  ICD-9-CM: 530.3 6/15/2016 - Present    Hypertension ICD-10-CM: I10  ICD-9-CM: 401.9 6/15/2016 - Present    Chronic back pain ICD-10-CM: M54.9, G89.29  ICD-9-CM: 724.5, 338.29 6/15/2016 - Present    Weight loss ICD-10-CM: R63.4  ICD-9-CM: 783.21 6/15/2016 - Present    Dementia (CMS/HCC) ICD-10-CM: F03.90  ICD-9-CM: 294.20 6/15/2016 - Present             History & Physical      Mallorie Oliver DO at 11/10/19 2329          Hospitalist History and Physical    Patient Identification:  Name: Demarcus Pastor  Age/Sex: 86 y.o. male  :  1933  MRN: [id number]         Primary Care Physician: Marcus Adames MD    Chief Complaint   Patient presents with   • Chest Pain   • Groin Pain       History of Present  Illness  Patient is a 86 y.o. 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    Apparently, [place] to the emergency department notes, the patient was very sore in his groin upon presentation.  The patient reportedly told the emergency department physician that he had left groin pain for a few days.  Otherwise the patient did not have any other complaints at that time.  Later on during the patient's emergency department visit, nursing staff reported that the patient complained of chest pain.    On my exam, the patient is resting comfortably in bed.  History is limited by his speech difficulties.  The patient currently denies chest pain.  He denies any abdominal pain and/or groin pain.  He denies recent cough.  He denies recent shortness of air.  He denies any recent vomiting.    In the emergency department, patient had a CT scan of the abdomen and pelvis without contrast that revealed bilateral fat-containing inguinal hernias.  There was no evidence for incarceration or strangulation.  He was also found to have some multiple air-fluid levels with prominent small bowel loops thought to be related to an ileus versus early or low-grade bowel obstruction.  It was also found to have sigmoid diverticulosis.  There was biliary air noted that was thought to be related to a prior sphincterotomy.  Patient is status post cholecystectomy.  Patient also had an ultrasound of his testicles which revealed that both testicles were normal.  He had a 2.7 cm simple right epididymal head cyst.  And was noted small left-sided fat-containing inguinal hernia that appeared stable from 2016.  Patient had a lactic acid level of 3.6 upon arrival to the  emergency department.  The initial troponin T level was 0.045.  proBNP was 6150.  CMP was remarkable for potassium of 3.3, creatinine 1.32 and an anion gap of 16.  TSH was mildly elevated at 5.230.  CBC was unremarkable with the exception of hemoglobin of 17 and hematocrit of 49.9.    The patient noted to have EKGs which revealed sinus rhythm with T wave changes mostly in the lateral leads.  Patient was evaluated by cardiology in the emergency department recommended an echocardiogram in the morning with ongoing medical management.    Patient has been admitted to the telemetry unit for further evaluation and management.    Present during visit: BREE Shah    Past History:  Past Medical History:   Diagnosis Date   • Abnormal CT of the abdomen 6/19/2016   • Colitis 6/19/2016   • CVA (cerebral vascular accident) (CMS/HCC)    • History of transfusion    • Hyperlipidemia    • Hypertension    • Pancreatitis      Past Surgical History:   Procedure Laterality Date   • COLONOSCOPY N/A 11/9/2016    Procedure: COLONOSCOPY CPT CODE: 66693;  Surgeon: Lilliam Wilson DO;  Location: [facility] OR;  Service:    • ENDOSCOPY N/A 6/17/2016    Procedure: ESOPHAGOGASTRODUODENOSCOPY;  Surgeon: Alex Robles III, MD;  Location: [facility] OR;  Service:    • ENDOSCOPY N/A 11/9/2016    Procedure: ESOPHAGOGASTRODUODENOSCOPY WITH BIOPSY CPT CODE: 11154;  Surgeon: Lilliam Wilson DO;  Location: [facility] OR;  Service:    • ESOPHAGEAL DILATATION       Family History   Problem Relation Age of Onset   • Stroke Mother    • Heart disease Brother      Social History     Tobacco Use   • Smoking status: Never Smoker   • Smokeless tobacco: Never Used   Substance Use Topics   • Alcohol use: No   • Drug use: No     Medications Prior to Admission   Medication Sig Dispense Refill Last Dose   • aspirin 81 MG EC tablet Take 81 mg by mouth Daily.   11/10/2019 at 0800   • atorvastatin (LIPITOR) 40 MG tablet Take 40 mg by mouth Daily.   11/9/2019 at 2000   •  clopidogrel (PLAVIX) 75 MG tablet Take 75 mg by mouth Daily.   11/10/2019 at 0800   • hydrOXYzine (ATARAX) 25 MG tablet Take 25 mg by mouth Every Night.   11/9/2019 at 2000   • metoprolol tartrate (LOPRESSOR) 25 MG tablet Take 12.5 mg by mouth 2 (Two) Times a Day.   11/10/2019 at 1600   • pantoprazole (PROTONIX) 40 MG EC tablet Take 40 mg by mouth Every Night.   11/9/2019 at 2000   • sacubitril-valsartan (ENTRESTO) 24-26 MG tablet Take 1 tablet by mouth Daily.   11/10/2019 at 0800   • thiamine (VITAMIN B-1) 100 MG tablet Take 100 mg by mouth Daily.   11/10/2019 at 0800     Allergies: Penicillins    Review of Systems:  Review of Systems   Respiratory: Negative for shortness of breath.    Cardiovascular: Negative for chest pain.   Gastrointestinal: Negative for abdominal pain and vomiting.   Genitourinary: Negative for testicular pain.     Complete ROS difficult to obtain due to expressive aphasia.    Vital Signs  Temp:  [97.9 °F (36.6 °C)] 97.9 °F (36.6 °C)  Heart Rate:  [73-95] 95  Resp:  [20-22] 20  BP: (149-158)/() 152/100  Body mass index is 21.93 kg/m².    Physical Exam:  Physical Exam   Constitutional: He appears well-developed and well-nourished. No distress. Nasal cannula in place.   Thin, chronically ill-appearing and in no acute distress.   HENT:   Head: Normocephalic and atraumatic.   Mouth/Throat: Oropharynx is clear and moist.   Eyes: Conjunctivae and EOM are normal.   Neck: Neck supple. No tracheal deviation present.   Cardiovascular: Normal rate and regular rhythm. Exam reveals no gallop and no friction rub.   No murmur heard.  Pulmonary/Chest: Breath sounds normal. No respiratory distress. He has no wheezes. He has no rales.   Abdominal: Soft. Bowel sounds are normal. He exhibits no distension. There is no hepatomegaly. There is no tenderness. There is no guarding. A hernia is present. Hernia confirmed positive in the left inguinal area (Not reducible, not painful to palpation.).    Musculoskeletal: Normal range of motion. He exhibits no tenderness.   Neurological: He is alert.   Right hand contracture.     Skin: Skin is warm and dry. No rash noted. There is erythema (Bilateral feet and distal extremities; likely due to crossing his legs.).   Psychiatric: He has a normal mood and affect.      Results Review:    Results from last 7 days   Lab Units 11/10/19  1724   WBC 10*3/mm3 6.13   HEMOGLOBIN g/dL 17.0   HEMATOCRIT % 49.9   PLATELETS 10*3/mm3 290     Results from last 7 days   Lab Units 11/10/19  1724   SODIUM mmol/L 142   POTASSIUM mmol/L 3.3*   CHLORIDE mmol/L 104   CO2 mmol/L 22.0   BUN mg/dL 12   CREATININE mg/dL 1.32*   CALCIUM mg/dL 9.7   GLUCOSE mg/dL 131*     Results from last 7 days   Lab Units 11/10/19  1724   BILIRUBIN mg/dL 0.7   ALK PHOS U/L 70   AST (SGOT) U/L 32   ALT (SGPT) U/L 22         Results from last 7 days   Lab Units 11/10/19  1724   MAGNESIUM mg/dL 2.0     Results from last 7 days   Lab Units 11/10/19  1940 11/10/19  1724   CK TOTAL U/L  --  144   TROPONIN T ng/mL 0.037* 0.045*     Results from last 7 days   Lab Units 11/10/19  1724   INR  0.92       Imaging:    I have personally reviewed the EKG. Multiple EKGs reviewed; SR with prolonged QTc, mild ST elevation in anterior leads; T wave abnormalities noted in the lateral leads.    CT images reviewed; left inguinal hernia noted; otherwise no acute abnormalities noted.    Imaging Results (Most Recent)     Procedure Component Value Units Date/Time    US Scrotum & Testicles [[id number]] Collected:  11/10/19 1916     Updated:  11/10/19 1918    Narrative:       US Scrotum and Contents    INDICATION:  Left-sided groin and scrotal pain. Suspected left-sided hernia    TECHNIQUE:   Sonographic evaluation of the scrotum. Color and pulsed wave doppler ultrasound was used to assess testicular vasculature.    COMPARISON:   CT abdomen and pelvis 11/7/2016 and 11/10/2019    FINDINGS:  The right testicle is 2.6 x 4.1 x 2.5 cm. It is  normal in appearance with some dilated rete testes visible. There is a cyst superior to the testicle consistent with an epididymal cyst. It measures 2.7 cm in diameter. This is a simple cyst. There is  normal flow in the right testicle. Left testicle measures 2.2 x 3.8 x 3.1 cm and has normal flow and normal echotexture. The left groin was evaluated and some hypoechoic longitudinally oriented material is visible. The CT scan done approximately same  time may show some fat within the inguinal canal hernia. No bowel involvement is noted.      Impression:       Both testicles are normal    2.7 cm simple right epididymal head cyst    The ultrasound suggests that there is some fatty material in the inguinal canal with different echotexture than the adjacent fatty tissue and on the CT scan there may be a small amount of fat extending down the inguinal canal to the patient may have a  small fat-containing inguinal hernia which appears unchanged from the 2016 CT scan.    Please note that the initial images on the study are incorrectly labeled right groin when they actually represent the left groin according to the technologist. She is going to re-labeled those images    Signer Name: Oleg Garcia MD   Signed: 11/10/2019 7:16 PM   Workstation Name: [email or web address]    [facility]    XR Chest 1 View [[id number]] Collected:  11/10/19 1839     Updated:  11/10/19 1842    Narrative:       CR Chest 1 Vw    INDICATION:   Chest pain     COMPARISON:    Chest x-ray 11/7/2016    FINDINGS:  Single portable AP view(s) of the chest.  Postoperative changes left upper quadrant. Cardiac silhouette size is top normal. Atherosclerotic carotid vascular calcification noted at the aortic knob. The thoracic aorta is at least ectatic and the patient is  mildly progressed from the prior study. There is some bibasilar atelectasis and mild prominence of interstitial markings in general. No pneumothorax.      Impression:         1.  Ectasia of the thoracic aorta mildly progressed from prior study.  2. Mild bibasilar atelectasis and prominence of interstitial markings in general.    Signer Name: Dorothy Coronado MD   Signed: 11/10/2019 6:39 PM   Workstation Name: [email or web address]    [facility]    CT Abdomen Pelvis Without Contrast [[id number]] Collected:  11/10/19 1838     Updated:  11/10/19 1840    Narrative:       INDICATION:   Concern for strangulated inguinal hernia. Patient is unable to provide a history.    TECHNIQUE:   CT of the abdomen and pelvis without contrast. Coronal and sagittal reconstructions were obtained.  Radiation dose reduction techniques included automated exposure control or exposure modulation based on body size. Radiation audit for number of CT and  nuclear cardiology exams performed in the last year: 0.      COMPARISON:   Abdomen pelvis CT scan 11/2/2016.    FINDINGS:  Lung bases: Dependent atelectasis. There are again densities left lung base/chest wall consistent with old surgical clips. This is unchanged and results in some artifact. There are coronary artery calcifications. There is a small hiatal hernia.    Abdomen: Lack of intravenous contrast media limits the study.    There is a streak artifact since the patient is unable to raise his arms. Intrahepatic biliary air is present as is air in the common bile duct. Please correlate for history of sphincterotomy. The patient is postcholecystectomy. The noncontrast liver is  otherwise grossly unremarkable allowing for the artifact from the arms. There is apparently been a splenectomy previously. The right adrenal gland is unremarkable. There is stable thickening of the left adrenal gland. There is no intrarenal calculus or  hydronephrosis. There are vascular calcifications involving the abdominal aorta with ectasia but no abdominal aortic aneurysm. 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  Noncontrast pancreas is not well seen and likely atrophic distally.    Visualized appendix is unremarkable.    There are multiple air-fluid levels within prominent small bowel loops but there is no definite transition point. Etiology not clear. This could reflect mild ileus. Early or low-grade obstruction not excluded. Follow-up recommended.    There are degenerative changes in the lumbar spine. There is no percutaneously drainable fluid collection or free intraperitoneal air appreciated.    Pelvis: Prostate gland is large measuring about 6 cm diameter. There are subtle densities dependently in the bladder concerning for small stones. Stones were also seen previously.    There is a large amount of gas and stool in the rectum and there is sigmoid diverticulosis. There is no CT evidence for acute diverticulitis.     There are fat-containing inguinal hernias bilaterally. There is no bowel appreciated within either the right or left inguinal hernia. Sigmoid colon protrudes to the orifice of the left inguinal hernia area      Impression:         1. There are fat-containing inguinal hernias bilaterally. There is no evidence for incarcerated bowel.  2. Multiple air-fluid levels within prominent small bowel loops. No transition point is appreciated. This is quite nonspecific and likely related to ileus. Early or low-grade obstruction not excluded. Follow-up recommended.  3. Please note the study is limited by technical factors and lack of contrast media. Prostate gland is enlarged and small bladder calculi are again seen.  5. Patient's post cholecystectomy and there is biliary air probably due to prior sphincterotomy. Please confirm clinically.  6. Post splenectomy.  7. Sigmoid diverticulosis without evidence for diverticulitis.  8. Atherosclerotic vascular disease.        Signer Name: Dorothy Coronado MD   Signed: 11/10/2019 6:38 PM   Workstation Name: [email or web address]    [facility]           Assessment/Plan     -Reported abdominal pain/left groin pain that may be related to an inguinal hernia: No strangulation or ulceration noted per CT imaging.  She is currently nothing by mouth.  I will place him on gentle IV fluids and as needed pain and nausea medication.  General surgery was contacted from the emergency department; will make an official consultation for further evaluation.    -SIRS criteria with lactic acidosis but no obvious source of infection: Will repeat lactic acid level after IV fluids. Will hold on IV antibiotics. Repeat CBC and lactic acid level again in am. Monitor temperature curve.     -Mild troponin T elevation with nonspecific EKG changes: Patient seen by cardiology in the emergency department.  She has received aspirin.  Patient has been placed on telemetry.  Continue to trend his troponin T levels.  Obtain an echocardiogram.  Currently awaiting patient's home medication list.    -Mild TSH elevation: Obtain free T4.    -History of cerebrovascular accident with expressive aphasia: Supportive care.  Currently awaiting patient's home medication list.  Patient is on a puréed diet at the nursing home with no milk or tea.    -Elevated anion gap: Patient currently n.p.o.  Continue gentle IV fluid hydration.  Repeat chemistry panel in a.m.    -Elevated proBNP level: Clinically patient does not appear volume overloaded.  No documented history of CHF.  I will order an echocardiogram in a.m.    DVT prophylaxis: Subcutaneous heparin    Estimated Length of Stay: > 2 MNs    Code: DNR per review of nursing home records    Patient is considered to be a high risk patient due to: Advanced age, history of CVA and hypertension EKG changes and lactic acidosis    I discussed the patients findings and my recommendations with patient and nursing staff    Mallorie Oliver DO  11/11/19  12:01 AM    Electronically signed by Mallorie Oliver DO at 11/11/19 0023       Lines, Drains & Airways     Active LDAs     Name:   Placement date:   Placement time:   Site:   Days:    Peripheral IV Distal;Posterior;Right Wrist   --    --    Wrist       Urethral Catheter 18 Fr.   11/11/19    1600     less than 1                Hospital Medications (active)       Dose Frequency Start End    aspirin EC tablet 81 mg 81 mg Daily 11/11/2019     Sig - Route: Take 1 tablet by mouth Daily. - Oral    atorvastatin (LIPITOR) tablet 40 mg 40 mg Daily 11/11/2019     Sig - Route: Take 1 tablet by mouth Daily. - Oral    bisacodyl (DULCOLAX) suppository 10 mg 10 mg Daily 11/11/2019     Sig - Route: Insert 1 suppository into the rectum Daily. - Rectal    clopidogrel (PLAVIX) tablet 75 mg 75 mg Daily 11/11/2019     Sig - Route: Take 1 tablet by mouth Daily. - Oral    finasteride (PROSCAR) tablet 5 mg 5 mg Daily 11/11/2019     Sig - Route: Take 1 tablet by mouth Daily. - Oral    furosemide (LASIX) injection 20 mg 20 mg Once 11/12/2019 11/12/2019    Sig - Route: Infuse 2 mL into a venous catheter 1 (One) Time. - Intravenous    heparin (porcine) 5000 UNIT/ML injection 5,000 Units 5,000 Units Every 12 Hours Scheduled 11/11/2019     Sig - Route: Inject 1 mL under the skin into the appropriate area as directed Every 12 (Twelve) Hours. - Subcutaneous    HYDROmorphone (DILAUDID) 2 MG/ML injection  - ADS Override Pull   11/11/2019 11/11/2019    Notes to Pharmacy: Created by cabinet override    hydrOXYzine (ATARAX) tablet 25 mg 25 mg Nightly 11/12/2019     Sig - Route: Take 1 tablet by mouth Every Night. - Oral    Lidocaine HCl Urethral/Mucosal 2% (XYLOCAINE) gel  Once 11/11/2019 11/11/2019    Sig - Route: Apply  topically to the appropriate area as directed 1 (One) Time. - Topical    metoprolol tartrate (LOPRESSOR) tablet 12.5 mg 12.5 mg 2 Times Daily 11/11/2019     Sig - Route: Take 0.5 tablets by mouth 2 (Two) Times a Day. - Oral    morphine injection 0.5 mg 0.5 mg Every 4 Hours PRN 11/11/2019 11/21/2019    Sig - Route: Infuse 0.25 mL into a  "venous catheter Every 4 (Four) Hours As Needed for Severe Pain . - Intravenous    nitroglycerin (NITROSTAT) SL tablet 0.4 mg 0.4 mg Every 5 Minutes PRN 11/10/2019     Sig - Route: Place 1 tablet under the tongue Every 5 (Five) Minutes As Needed for Chest Pain (Only if SBP Greater Than 100). - Sublingual    pantoprazole (PROTONIX) EC tablet 40 mg 40 mg Nightly 11/11/2019     Sig - Route: Take 1 tablet by mouth Every Night. - Oral    potassium chloride 10 mEq in 100 mL IVPB 10 mEq Every 1 Hour 11/11/2019 11/11/2019    Sig - Route: Infuse 100 mL into a venous catheter Every 1 (One) Hour. - Intravenous    sacubitril-valsartan (ENTRESTO) 24-26 MG tablet 1 tablet 1 tablet Daily 11/11/2019     Sig - Route: Take 1 tablet by mouth Daily. - Oral    sodium chloride 0.9 % flush 10 mL 10 mL As Needed 11/10/2019     Sig - Route: Infuse 10 mL into a venous catheter As Needed for Line Care. - Intravenous    Linked Group 1:  \"And\" Linked Group Details        sodium chloride 0.9 % flush 10 mL 10 mL Every 12 Hours Scheduled 11/11/2019     Sig - Route: Infuse 10 mL into a venous catheter Every 12 (Twelve) Hours. - Intravenous    sodium chloride 0.9 % flush 10 mL 10 mL As Needed 11/10/2019     Sig - Route: Infuse 10 mL into a venous catheter As Needed for Line Care. - Intravenous    tamsulosin (FLOMAX) 24 hr capsule 0.4 mg 0.4 mg Daily 11/11/2019     Sig - Route: Take 1 capsule by mouth Daily. - Oral    thiamine (VITAMIN B-1) tablet 100 mg 100 mg Daily 11/11/2019     Sig - Route: Take 1 tablet by mouth Daily. - Oral    Lidocaine HCl Urethral/Mucosal 2% (XYLOCAINE) gel (Discontinued)  Once 11/11/2019 11/11/2019    Sig - Route: Apply  topically to the appropriate area as directed 1 (One) Time. - Topical    sodium chloride 0.9 % with KCl 20 mEq/L infusion (Discontinued) 75 mL/hr Continuous 11/11/2019 11/11/2019    Sig - Route: Infuse 75 mL/hr into a venous catheter Continuous. - Intravenous            Lab Results (last 24 hours)     " Procedure Component Value Units Date/Time    Magnesium [[id number]]  (Normal) Collected:  11/12/19 0327    Specimen:  Blood Updated:  11/12/19 1016     Magnesium 2.2 mg/dL     Basic Metabolic Panel [[id number]]  (Abnormal) Collected:  11/12/19 0327    Specimen:  Blood Updated:  11/12/19 0409     Glucose 94 mg/dL      BUN 11 mg/dL      Creatinine 1.04 mg/dL      Sodium 139 mmol/L      Potassium 4.2 mmol/L      Comment: 1+ Hemolysis  Specimen hemolyzed.  Results may be affected.        Chloride 110 mmol/L      CO2 17.5 mmol/L      Calcium 8.4 mg/dL      eGFR Non African Amer 68 mL/min/1.73      BUN/Creatinine Ratio 10.6     Anion Gap 11.5 mmol/L     Narrative:       GFR Normal >60  Chronic Kidney Disease <60  Kidney Failure <15    BNP [[id number]]  (Abnormal) Collected:  11/12/19 0327    Specimen:  Blood Updated:  11/12/19 0404     proBNP 5,901.0 pg/mL     Narrative:       Among patients with dyspnea, NT-proBNP is highly sensitive for the detection of acute congestive heart failure. In addition NT-proBNP of <300 pg/ml effectively rules out acute congestive heart failure with 99% negative predictive value.    Lactic Acid, Plasma [[id number]]  (Normal) Collected:  11/12/19 0327    Specimen:  Blood Updated:  11/12/19 0401     Lactate 0.9 mmol/L     CBC & Differential [[id number]] Collected:  11/12/19 0327    Specimen:  Blood Updated:  11/12/19 0345    Narrative:       The following orders were created for panel order CBC & Differential.  Procedure                               Abnormality         Status                     ---------                               -----------         ------                     CBC Auto Differential[[id number]]        Abnormal            Final result                 Please view results for these tests on the individual orders.    CBC Auto Differential [[id number]]  (Abnormal) Collected:  11/12/19 0327    Specimen:  Blood Updated:  11/12/19 0345     WBC 8.51 10*3/mm3      RBC 4.59 10*6/mm3       Hemoglobin 14.2 g/dL      Hematocrit 42.9 %      MCV 93.5 fL      MCH 30.9 pg      MCHC 33.1 g/dL      RDW 14.8 %      RDW-SD 51.0 fl      MPV 11.4 fL      Platelets 225 10*3/mm3      Neutrophil % 62.1 %      Lymphocyte % 24.3 %      Monocyte % 11.8 %      Eosinophil % 0.6 %      Basophil % 0.8 %      Immature Grans % 0.4 %      Neutrophils, Absolute 5.29 10*3/mm3      Lymphocytes, Absolute 2.07 10*3/mm3      Monocytes, Absolute 1.00 10*3/mm3      Eosinophils, Absolute 0.05 10*3/mm3      Basophils, Absolute 0.07 10*3/mm3      Immature Grans, Absolute 0.03 10*3/mm3      nRBC 0.0 /100 WBC     Lactic Acid, Plasma [[id number]]  (Abnormal) Collected:  11/11/19 2051    Specimen:  Blood Updated:  11/11/19 2127     Lactate 2.5 mmol/L     Potassium [[id number]]  (Normal) Collected:  11/11/19 2051    Specimen:  Blood Updated:  11/11/19 2119     Potassium 3.5 mmol/L     Blood Culture - Blood, Arm, Right [[id number]] Collected:  11/10/19 1832    Specimen:  Blood from Arm, Right Updated:  11/11/19 1845     Blood Culture No growth at 24 hours    Lipase [[id number]]  (Abnormal) Collected:  11/11/19 1042    Specimen:  Blood Updated:  11/11/19 1832     Lipase 252 U/L     Blood Culture - Blood, Arm, Left [[id number]] Collected:  11/10/19 1741    Specimen:  Blood from Arm, Left Updated:  11/11/19 1800     Blood Culture No growth at 24 hours    Lactic Acid, Plasma [[id number]]  (Abnormal) Collected:  11/11/19 1607    Specimen:  Blood Updated:  11/11/19 1732     Lactate 2.3 mmol/L         Orders (last 24 hrs)     Start     Ordered    11/13/19 0600  Basic Metabolic Panel  Morning Draw      11/12/19 1000    11/13/19 0600  Magnesium  Morning Draw      11/12/19 1000    11/12/19 2100  hydrOXYzine (ATARAX) tablet 25 mg  Nightly      11/11/19 0121    11/12/19 1207  Activity As Tolerated  Until Discontinued      11/12/19 1206    11/12/19 1100  furosemide (LASIX) injection 20 mg  Once      11/12/19 1000    11/12/19 1000  Magnesium  Once      11/12/19  1000    11/12/19 1000  Diet Soft Texture; Ground  Diet Effective Now      11/12/19 1000    11/12/19 1000  XR Abdomen KUB  1 Time Imaging      11/12/19 1000    11/12/19 0957  Wean Fio2  Nursing Communication  Once     Comments:  Wean Fio2    11/12/19 0956    11/12/19 0600  BNP  Morning Draw      11/11/19 1032    11/12/19 0600  Basic Metabolic Panel  Morning Draw      11/11/19 1032    11/12/19 0600  CBC & Differential  Morning Draw      11/11/19 1032    11/12/19 0600  Lactic Acid, Plasma  Morning Draw      11/11/19 1753    11/12/19 0600  CBC Auto Differential  PROCEDURE ONCE      11/12/19 0002    11/12/19 0023  ECG 12 Lead  STAT,   Status:  Canceled      11/12/19 0023    11/11/19 2200  Lactic Acid, Plasma  Once      11/11/19 1753    11/11/19 2030  Potassium  Timed      11/11/19 1806    11/11/19 1800  tamsulosin (FLOMAX) 24 hr capsule 0.4 mg  Daily      11/11/19 1611    11/11/19 1800  finasteride (PROSCAR) tablet 5 mg  Daily      11/11/19 1612    11/11/19 1758  Lipase  Once      11/11/19 1757    11/11/19 1731  Diet Clear Liquid  Diet Effective Now,   Status:  Canceled      11/11/19 1730    11/11/19 1700  Lactic Acid, Plasma  Once      11/11/19 1135    11/11/19 1615  Lidocaine HCl Urethral/Mucosal 2% (XYLOCAINE) gel  Once,   Status:  Discontinued      11/11/19 1518    11/11/19 1615  Lidocaine HCl Urethral/Mucosal 2% (XYLOCAINE) gel  Once      11/11/19 1525    11/11/19 1554  HYDROmorphone (DILAUDID) 2 MG/ML injection  - ADS Override Pull     Comments:  Created by cabinet override    11/11/19 1554    11/11/19 1515  HYDROmorphone (DILAUDID) injection 0.5 mg  Once      11/11/19 1424    11/11/19 1242  morphine injection 0.5 mg  Every 4 Hours PRN      11/11/19 1242    11/11/19 1200  potassium chloride 10 mEq in 100 mL IVPB  Every 1 Hour      11/11/19 1032    11/11/19 1100  bisacodyl (DULCOLAX) suppository 10 mg  Daily      11/11/19 1033    11/11/19 0900  aspirin EC tablet 81 mg  Daily      11/11/19 0121    11/11/19 0900   atorvastatin (LIPITOR) tablet 40 mg  Daily      11/11/19 0121 11/11/19 0900  clopidogrel (PLAVIX) tablet 75 mg  Daily      11/11/19 0121 11/11/19 0900  metoprolol tartrate (LOPRESSOR) tablet 12.5 mg  2 Times Daily      11/11/19 0121 11/11/19 0900  sacubitril-valsartan (ENTRESTO) 24-26 MG tablet 1 tablet  Daily      11/11/19 0121 11/11/19 0900  thiamine (VITAMIN B-1) tablet 100 mg  Daily      11/11/19 0121 11/11/19 0215  pantoprazole (PROTONIX) EC tablet 40 mg  Nightly      11/11/19 0121 11/11/19 0115  sodium chloride 0.9 % with KCl 20 mEq/L infusion  Continuous,   Status:  Discontinued      11/11/19 0025    11/11/19 0015  sodium chloride 0.9 % flush 10 mL  Every 12 Hours Scheduled      11/10/19 2326 11/11/19 0015  heparin (porcine) 5000 UNIT/ML injection 5,000 Units  Every 12 Hours Scheduled      11/10/19 2326    11/10/19 2326  sodium chloride 0.9 % flush 10 mL  As Needed      11/10/19 2326    11/10/19 2326  nitroglycerin (NITROSTAT) SL tablet 0.4 mg  Every 5 Minutes PRN      11/10/19 2326    11/10/19 1711  sodium chloride 0.9 % flush 10 mL  As Needed      11/10/19 1711    Unscheduled  Telemetry - Pulse Oximetry  Continuous PRN     Comments:  If Patient Develops Unresponsiveness, Acute Dyspnea, Cyanosis or Suspected Hypoxemia Start Continuous Pulse Ox Monitoring, Apply Oxygen & Notify Provider    11/10/19 2326    Unscheduled  Oxygen Therapy- Nasal Cannula; Titrate for SPO2: 90% - 95%  Continuous PRN     Comments:  If Patient Develops Unresponsiveness, Acute Dyspnea, Cyanosis or Suspected Hypoxemia Start Continuous Pulse Ox Monitoring, Apply Oxygen & Notify Provider    11/10/19 2326    Unscheduled  ECG 12 Lead  As Needed     Comments:  Nurse to Release if Patient Expericences Acute Chest Pain or Dysrhythmias    11/10/19 2326    Unscheduled  Potassium  As Needed     Comments:  For Ventricular Arrhythmias      11/10/19 2326    Unscheduled  Magnesium  As Needed     Comments:  For Ventricular  Arrhythmias      11/10/19 2326    Unscheduled  Troponin  As Needed     Comments:  For Chest Pain      11/10/19 2326    Unscheduled  Digoxin Level  As Needed     Comments:  For Atrial Arrhythmias      11/10/19 2326    Unscheduled  Blood Gas, Arterial  As Needed     Comments:  Per O2 PolicyNotify Physician      11/10/19 2326    Unscheduled  Apply Moisture Barrier After Any Incontinence  As Needed      11/11/19 1112    Unscheduled  Up With Assistance  As Needed      11/12/19 1206    --  aspirin 81 MG EC tablet  Daily      11/10/19 2334    --  atorvastatin (LIPITOR) 40 MG tablet  Daily      11/10/19 2334    --  clopidogrel (PLAVIX) 75 MG tablet  Daily      11/10/19 2334    --  hydrOXYzine (ATARAX) 25 MG tablet  Nightly      11/10/19 2334    --  pantoprazole (PROTONIX) 40 MG EC tablet  Nightly      11/10/19 2334    --  metoprolol tartrate (LOPRESSOR) 25 MG tablet  2 Times Daily      11/10/19 2334    --  sacubitril-valsartan (ENTRESTO) 24-26 MG tablet  Daily      11/10/19 2334    --  thiamine (VITAMIN B-1) 100 MG tablet  Daily      11/10/19 2334    Pending  aspirin EC tablet 81 mg  Daily      Pending    Pending  atorvastatin (LIPITOR) tablet 40 mg  Daily      Pending    Pending  clopidogrel (PLAVIX) tablet 75 mg  Daily      Pending    Pending  hydrOXYzine (ATARAX) tablet 25 mg  Nightly      Pending    Pending  pantoprazole (PROTONIX) EC tablet 40 mg  Nightly      Pending    Pending  metoprolol tartrate (LOPRESSOR) tablet 12.5 mg  Every 12 Hours Scheduled      Pending    Pending  sacubitril-valsartan (ENTRESTO) 24-26 MG tablet 1 tablet  Daily      Pending    Pending  thiamine (VITAMIN B-1) tablet 100 mg  Daily      Pending    Pending  Opioid Administration - Continuous Pulse Oximetry (SpO2) Monitoring  Continuous      Pending    Pending  Opioid Administration - Document SpO2 Value With Each Set of Vitals & Any Change in Patient Status  Continuous      Pending    Pending  Opioid Administration - Notify Provider Pulse  Oximetry (SpO2)  Until Discontinued      Pending          Operative/Procedure Notes (last 24 hours) (Notes from 19 1450 through 19 1450)     No notes of this type exist for this encounter.           Physician Progress Notes (last 24 hours) (Notes from 19 1450 through 19 1450)      Flex Clark MD at 19 1014        Inguinal hernias    He cannot provide any history, but he did have the eccho yesterday. He had some pain yesterday and it did improve after a portillo catheter was placed. However, he apparently has been pulling on it and it has a little blood. His hernias are soft and it is difficult to tell if his discomfort is from them or something in his abdomen like his bladder or colon. The hernias are completely reduced and have no indication of incarceration. He is tolerating his liquid diet and is advancing today. I think it would be best to follow up outpatient for his hernias as they do not seem to be the obvious problem now.     Electronically signed by Flex Clark MD at 19 1020     Sofya Damon APRN at 19 0939             LOS: 2 days     Name: Demarcus Pastor  Age/Sex: 86 y.o. male  :  1933        PCP: Marcus Adames MD    Active Problems:    Abdominal pain      Admission Information: Demarcus Pastor is a 86 y.o. male with a past medical history significant for CVA with expressive aphasia, systolic heart failure, hypertension and hyperlipidemia.  He presented to the ED with report of abdominal pain and groin pain.     Chief Complaint:  follow-up elevated troponin, abnormal EKG, and CHF elevated    Interval history: Patient was seen and examined.  He denies chest pain, palpitations.  Patient is not currently wearing O2.      Subjective     Review of Systems   Constitution: Negative for weakness and malaise/fatigue.   Cardiovascular: Negative for chest pain, dyspnea on exertion, irregular heartbeat, leg swelling, near-syncope, orthopnea, palpitations,  paroxysmal nocturnal dyspnea and syncope.   Respiratory: Negative for shortness of breath.    Hematologic/Lymphatic: Does not bruise/bleed easily.   Neurological: Negative for dizziness and light-headedness.       Vital Signs  Vital Signs (last 72 hrs)       11/09 0700  -  11/10 0659 11/10 0700  -  11/11 0659 11/11 0700  -  11/12 0659 11/12 0700  -  11/12 0940   Most Recent    Temp (°F)   96.9 -  98    98.2 -  98.8       98.4 (36.9)    Heart Rate   54 -  95    69 -  90       82    Resp   18 -  22    18 -  20       20    BP   100/62 -  162/98    126/84 -  151/78       129/60    SpO2 (%)   94 -  98    97 -  99       98        Temp:  [98.2 °F (36.8 °C)-98.8 °F (37.1 °C)] 98.4 °F (36.9 °C)  Heart Rate:  [69-90] 82  Resp:  [18-20] 20  BP: (126-151)/(60-84) 129/60  Body mass index is 18.87 kg/m².      Intake/Output Summary (Last 24 hours) at 11/12/2019 0940  Last data filed at 11/12/2019 0839  Gross per 24 hour   Intake 360 ml   Output 675 ml   Net -315 ml       Physical Exam   Constitutional: He appears well-developed and well-nourished.   Neck: No JVD present. Carotid bruit is not present.   Cardiovascular: Normal rate and regular rhythm.   No lower extremity edema   Pulmonary/Chest: Effort normal and breath sounds normal. No respiratory distress. He has no wheezes. He has no rales.   Abdominal: Soft. Bowel sounds are normal. He exhibits no distension. There is no tenderness.   Neurological: He is alert.   Skin: Skin is warm and dry.   Psychiatric: He has a normal mood and affect. Cognition and memory are normal.   Vitals reviewed.      Telemetry:  Sinus 60-70s       Results Review:     Results from last 7 days   Lab Units 11/12/19 0327 11/11/19  0106 11/10/19  1724   WBC 10*3/mm3 8.51 8.24 6.13   HEMOGLOBIN g/dL 14.2 14.8 17.0   PLATELETS 10*3/mm3 225 231 290     Results from last 7 days   Lab Units 11/12/19  0327 11/11/19 2051 11/11/19  0106 11/10/19  1724   SODIUM mmol/L 139  --  144 142   POTASSIUM mmol/L 4.2 3.5  3.3* 3.3*   CHLORIDE mmol/L 110*  --  108* 104   CO2 mmol/L 17.5*  --  24.7 22.0   BUN mg/dL 11  --  10 12   CREATININE mg/dL 1.04  --  1.16 1.32*   CALCIUM mg/dL 8.4*  --  8.7 9.7   GLUCOSE mg/dL 94  --  97 131*     Results from last 7 days   Lab Units 11/11/19  1042 11/11/19  0730 11/11/19  0106 11/10/19  1940 11/10/19  1724   CK TOTAL U/L  --   --   --   --  144   TROPONIN T ng/mL 0.058* 0.050* 0.041* 0.037* 0.045*       Results from last 7 days   Lab Units 11/10/19  1724   INR  0.92       I reviewed the patient's new clinical results.  I reviewed the patient's new imaging results and agree with the interpretation.  I personally viewed and interpreted the patient's EKG/Telemetry data      Medication Review:     aspirin 81 mg Oral Daily   atorvastatin 40 mg Oral Daily   bisacodyl 10 mg Rectal Daily   clopidogrel 75 mg Oral Daily   finasteride 5 mg Oral Daily   heparin (porcine) 5,000 Units Subcutaneous Q12H   hydrOXYzine 25 mg Oral Nightly   metoprolol tartrate 12.5 mg Oral BID   pantoprazole 40 mg Oral Nightly   sacubitril-valsartan 1 tablet Oral Daily   sodium chloride 10 mL Intravenous Q12H   tamsulosin 0.4 mg Oral Daily   thiamine 100 mg Oral Daily          Assessment:  1. Elevated troponin, likely secondary to creatinine elevation.  2. 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    3. History of CAD, stable  4. Ischemic cardiomyopathy with LVEF of 36 to 40% per echocardiogram of 11/11/2019.  Stable.  Compensated.  Patient is on Entresto  5. Inguinal hernia, management per surgery.      Recommendations:  1. Patient is stable from a cardiac standpoint.  He should continue guideline directed medical management for coronary artery disease and ischemic cardiomyopathy.  These medications include aspirin, Plavix, atorvastatin, Lopressor and Entresto.  2. We will sign off, if needed further please call.    I discussed the patients findings and my  recommendations with patient and family    THAO Hill  19  9:40 AM    Addendum:           Electronically signed by Sofya Damon APRN at 19 5232     Nabila Lassiter MD at 19 2686              [facility]     Patient Identification:  Name:  Demarcus Pastor  Age:  86 y.o.  Sex:  male  :  1933  MRN:  [id number]  Visit Number:  [id number]  ROOM: [address] [place] [address]     Primary Care Provider:  Marcus Adames MD    Length of stay:  1    Subjective        Chief Compliant Follow up for the abdominal pain    Patient seen and examined with the RN Sal at the bedside. Patient currently complaints of abdominal pain. History is limited because of the aphasia. Answers mostly in yes and no. Denies chest pain. Had dulcolax supp and had BM after that. Also noted to have urinary rentention so urology consulted and the portillo catheter placed. Afebrile and no events overnight.       Objective     Current Hospital Meds:  aspirin 81 mg Oral Daily   atorvastatin 40 mg Oral Daily   bisacodyl 10 mg Rectal Daily   clopidogrel 75 mg Oral Daily   finasteride 5 mg Oral Daily   heparin (porcine) 5,000 Units Subcutaneous Q12H   [START ON 2019] hydrOXYzine 25 mg Oral Nightly   metoprolol tartrate 12.5 mg Oral BID   pantoprazole 40 mg Oral Nightly   potassium chloride 10 mEq Intravenous Q1H   sacubitril-valsartan 1 tablet Oral Daily   sodium chloride 10 mL Intravenous Q12H   tamsulosin 0.4 mg Oral Daily   thiamine 100 mg Oral Daily     sodium chloride 0.9 % with KCl 20 mEq 75 mL/hr Last Rate: 75 mL/hr (19 1153)     ----------------------------------------------------------------------------------------------------------------------  Vital Signs:  Temp:  [96.9 °F (36.1 °C)-98 °F (36.7 °C)] 97.6 °F (36.4 °C)  Heart Rate:  [54-95] 90  Resp:  [18-22] 18  BP: (100-162)/() 126/84  SpO2:  [94 %-98 %] 97 %  on  Flow (L/min):  [2] 2;   Device (Oxygen Therapy): nasal  cannula  Body mass index is 19.71 kg/m².    Wt Readings from Last 3 Encounters:   11/11/19 57 kg (125 lb 9.6 oz)   02/09/18 63.5 kg (140 lb)   11/07/16 58 kg (127 lb 12.8 oz)       Intake/Output Summary (Last 24 hours) at 11/11/2019 1743  Last data filed at 11/11/2019 0900  Gross per 24 hour   Intake --   Output 200 ml   Net -200 ml     Diet Clear Liquid  ----------------------------------------------------------------------------------------------------------------------  Physical exam:  General: Comfortable, Not in distress.  Well-developed and well-nourished.   HENT:  Head:  Normocephalic and atraumatic.  Mouth:  Moist mucous membranes.    Eyes:  Conjunctivae and EOM are normal.  Pupils are equal, round, and reactive to light.  No scleral icterus.    Neck:  Neck supple.  No JVD present.    Cardiovascular:  Normal rate, regular rhythm with no murmur.  Pulmonary/Chest:  No respiratory distress, no wheezes, no crackles, with normal breath sounds and good air movement.  Abdomen:  Soft.  Bowel sounds are normal.  No distension and mild diffuse tenderness.   Musculoskeletal:  No edema, no tenderness, and no deformity.  No red or swollen joints anywhere.    Neurological:  Alert.  No cranial nerve deficit. No focal deficits. No facial droop.  Aphasia +  Skin:  Skin is warm and dry. No rash noted. No pallor.   Peripheral vascular:  pulses in all 4 extremities with no clubbing, no cyanosis, no edema.  Genitourinary: portillo +  ----------------------------------------------------------------------------------------------------------------------  ----------------------------------------------------------------------------------------------------------------------Results from last 7 days   Lab Units 11/11/19  1607 11/11/19  1042 11/11/19  0106 11/10/19  2208  11/10/19  1724   LACTATE mmol/L 2.3* 2.5*  --  2.1*   < >  --    WBC 10*3/mm3  --   --  8.24  --   --  6.13   HEMOGLOBIN g/dL  --   --  14.8  --   --  17.0   HEMATOCRIT %   --   --  44.1  --   --  49.9   MCV fL  --   --  92.5  --   --  90.1   MCHC g/dL  --   --  33.6  --   --  34.1   PLATELETS 10*3/mm3  --   --  231  --   --  290   INR   --   --   --   --   --  0.92    < > = values in this interval not displayed.     Results from last 7 days   Lab Units 11/11/19  0106 11/10/19  1724   SODIUM mmol/L 144 142   POTASSIUM mmol/L 3.3* 3.3*   MAGNESIUM mg/dL  --  2.0   CHLORIDE mmol/L 108* 104   CO2 mmol/L 24.7 22.0   BUN mg/dL 10 12   CREATININE mg/dL 1.16 1.32*   EGFR IF NONAFRICN AM mL/min/1.73 60* 51*   CALCIUM mg/dL 8.7 9.7   GLUCOSE mg/dL 97 131*   ALBUMIN g/dL 3.06* 3.97   BILIRUBIN mg/dL 0.7 0.7   ALK PHOS U/L 53 70   AST (SGOT) U/L 24 32   ALT (SGPT) U/L 16 22   Estimated Creatinine Clearance: 36.9 mL/min (by C-G formula based on SCr of 1.16 mg/dL).  Results from last 7 days   Lab Units 11/11/19  1042 11/11/19  0730 11/11/19  0106  11/10/19  1724   CK TOTAL U/L  --   --   --   --  144   TROPONIN T ng/mL 0.058* 0.050* 0.041*   < > 0.045*    < > = values in this interval not displayed.     No results found for: HGBA1C, POCGLU  No results found for: AMMONIA  Results from last 7 days   Lab Units 11/11/19  0730   CHOLESTEROL mg/dL 90   TRIGLYCERIDES mg/dL 62   HDL CHOL mg/dL 33*   LDL CHOL mg/dL 45     Results from last 7 days   Lab Units 11/10/19  2037   NITRITE UA  Negative   WBC UA /HPF 0-2   BACTERIA UA /HPF None Seen   SQUAM EPITHEL UA /HPF 0-2             I have personally looked at the labs and they are summarized above.  ----------------------------------------------------------------------------------------------------------------------  Imaging Results (Last 24 Hours)     Procedure Component Value Units Date/Time    XR Abdomen KUB [[id number]] Updated:  11/11/19 1438    US Scrotum & Testicles [[id number]] Collected:  11/10/19 1916     Updated:  11/10/19 1918    Narrative:       US Scrotum and Contents    INDICATION:  Left-sided groin and scrotal pain. Suspected left-sided  hernia    TECHNIQUE:   Sonographic evaluation of the scrotum. Color and pulsed wave doppler ultrasound was used to assess testicular vasculature.    COMPARISON:   CT abdomen and pelvis 11/7/2016 and 11/10/2019    FINDINGS:  The right testicle is 2.6 x 4.1 x 2.5 cm. It is normal in appearance with some dilated rete testes visible. There is a cyst superior to the testicle consistent with an epididymal cyst. It measures 2.7 cm in diameter. This is a simple cyst. There is  normal flow in the right testicle. Left testicle measures 2.2 x 3.8 x 3.1 cm and has normal flow and normal echotexture. The left groin was evaluated and some hypoechoic longitudinally oriented material is visible. The CT scan done approximately same  time may show some fat within the inguinal canal hernia. No bowel involvement is noted.      Impression:       Both testicles are normal    2.7 cm simple right epididymal head cyst    The ultrasound suggests that there is some fatty material in the inguinal canal with different echotexture than the adjacent fatty tissue and on the CT scan there may be a small amount of fat extending down the inguinal canal to the patient may have a  small fat-containing inguinal hernia which appears unchanged from the 2016 CT scan.    Please note that the initial images on the study are incorrectly labeled right groin when they actually represent the left groin according to the technologist. She is going to re-labeled those images    Signer Name: Oleg Garcia MD   Signed: 11/10/2019 7:16 PM   Workstation Name: [email or web address]    [facility] Chest 1 View [[id number]] Collected:  11/10/19 1839     Updated:  11/10/19 1842    Narrative:       CR Chest 1 Vw    INDICATION:   Chest pain     COMPARISON:    Chest x-ray 11/7/2016    FINDINGS:  Single portable AP view(s) of the chest.  Postoperative changes left upper quadrant. Cardiac silhouette size is top normal. Atherosclerotic carotid vascular  calcification noted at the aortic knob. The thoracic aorta is at least ectatic and the patient is  mildly progressed from the prior study. There is some bibasilar atelectasis and mild prominence of interstitial markings in general. No pneumothorax.      Impression:         1. Ectasia of the thoracic aorta mildly progressed from prior study.  2. Mild bibasilar atelectasis and prominence of interstitial markings in general.    Signer Name: Dorothy Coronado MD   Signed: 11/10/2019 6:39 PM   Workstation Name: [email or web address]    [facility]    CT Abdomen Pelvis Without Contrast [[id number]] Collected:  11/10/19 1838     Updated:  11/10/19 1840    Narrative:       INDICATION:   Concern for strangulated inguinal hernia. Patient is unable to provide a history.    TECHNIQUE:   CT of the abdomen and pelvis without contrast. Coronal and sagittal reconstructions were obtained.  Radiation dose reduction techniques included automated exposure control or exposure modulation based on body size. Radiation audit for number of CT and  nuclear cardiology exams performed in the last year: 0.      COMPARISON:   Abdomen pelvis CT scan 11/2/2016.    FINDINGS:  Lung bases: Dependent atelectasis. There are again densities left lung base/chest wall consistent with old surgical clips. This is unchanged and results in some artifact. There are coronary artery calcifications. There is a small hiatal hernia.    Abdomen: Lack of intravenous contrast media limits the study.    There is a streak artifact since the patient is unable to raise his arms. Intrahepatic biliary air is present as is air in the common bile duct. Please correlate for history of sphincterotomy. The patient is postcholecystectomy. The noncontrast liver is  otherwise grossly unremarkable allowing for the artifact from the arms. There is apparently been a splenectomy previously. The right adrenal gland is unremarkable. There is stable thickening of the left adrenal  gland. There is no intrarenal calculus or  hydronephrosis. There are vascular calcifications involving the abdominal aorta with ectasia but no abdominal aortic aneurysm. 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  Noncontrast pancreas is not well seen and likely atrophic distally.    Visualized appendix is unremarkable.    There are multiple air-fluid levels within prominent small bowel loops but there is no definite transition point. Etiology not clear. This could reflect mild ileus. Early or low-grade obstruction not excluded. Follow-up recommended.    There are degenerative changes in the lumbar spine. There is no percutaneously drainable fluid collection or free intraperitoneal air appreciated.    Pelvis: Prostate gland is large measuring about 6 cm diameter. There are subtle densities dependently in the bladder concerning for small stones. Stones were also seen previously.    There is a large amount of gas and stool in the rectum and there is sigmoid diverticulosis. There is no CT evidence for acute diverticulitis.     There are fat-containing inguinal hernias bilaterally. There is no bowel appreciated within either the right or left inguinal hernia. Sigmoid colon protrudes to the orifice of the left inguinal hernia area      Impression:         1. There are fat-containing inguinal hernias bilaterally. There is no evidence for incarcerated bowel.  2. Multiple air-fluid levels within prominent small bowel loops. No transition point is appreciated. This is quite nonspecific and likely related to ileus. Early or low-grade obstruction not excluded. Follow-up recommended.  3. Please note the study is limited by technical factors and lack of contrast media. Prostate gland is enlarged and small bladder calculi are again seen.  5. Patient's post cholecystectomy and there is biliary air probably due to prior sphincterotomy. Please  confirm clinically.  6. Post splenectomy.  7. Sigmoid diverticulosis without evidence for diverticulitis.  8. Atherosclerotic vascular disease.        Signer Name: Dorothy Coronado MD   Signed: 11/10/2019 6:38 PM   Workstation Name: [email or web address]    [facility]        I have personally reviewed the radiology images and read the final radiology report.    Assessment & Plan       Assessment:  Mild Elevated troponin  CMP with EF of 36-40%  Abdominal pain  Ileus  Acute Urinary retention with BPH  Lactic acidosis  Hypokalemia  Inguinal Hernia  History of cerebrovascular accident with expressive aphasia    Plan:  Mild Elevated troponin, troponin level trended and minimally trending up. continue with ASA, plavix statin and lopressor. Cardiology consulted. 2D echo with EF of 36-40%, likely chronic since on entresto at home. Chart review done and no PCP or consultation note found. Check lipids profile.     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    Abdominal pain likely multifactorial because of Ileus and urinary retention.For Ileus, will get xray KUB and on dulcolax supp.  Inguinal Hernia, no work up needed now. Had bm.     Acute Urinary retention with BPH, s/p Bajwa. Urology consulted and started on proscar.     Lactic acidosis, monitor. Trending down. Etiology unknown. DC IVF since EF of 36% and elevated BNP. Afebrile and VSS. UA negative.     Hypokalemia, replace and monitor.     History of cerebrovascular accident with expressive aphasia, continue with ASA, plavix and statin.     DIET: Clear liquid diet.     The patient is high risk due to the following diagnoses/reasons:    Mild Elevated troponin, CMP with EF of 36-40%, Abdominal pain  Ileus, Acute Urinary retention with BPH, Lactic acidosis    Nablia Lassiter MD  11/11/19  5:43 PM    Electronically signed by Nabila Lassiter MD at 11/11/19 4128     Sofya Damon APRN at 11/11/19 7202             LOS: 1 day     Name:  "Demarcus Pastor  Age/Sex: 86 y.o. male  :  1933        PCP: Marcus Adames MD    Active Problems:    Abdominal pain      Admission Information: Demarcus Pastor is a 86 y.o. male nursing home resident with a past medical history significant for CVA with expressive aphasia, systolic heart failure, hypertension and hyperlipidemia.  He presented to the ED with report of abdominal pain and groin pain.     Chief Complaint: Follow up elevated troponin and abnormal EKG    Interval history: Patient was seen and examined.  He answers \"yes and no\" questions easily.  He denies chest pain.  He denies past medical history of coronary artery disease.  He denies seeing a cardiologist regularly.    Subjective     Review of Systems   Constitution: Negative for weakness and malaise/fatigue.   Cardiovascular: Negative for chest pain, dyspnea on exertion, irregular heartbeat, leg swelling, near-syncope, orthopnea, palpitations, paroxysmal nocturnal dyspnea and syncope.   Respiratory: Negative for shortness of breath.    Hematologic/Lymphatic: Does not bruise/bleed easily.   Neurological: Negative for dizziness and light-headedness.       Vital Signs  Vital Signs (last 72 hrs)        07  -   0659 700  -  11/10 0659 11/10 0700  -   0659 700  -   1739   Most Recent    Temp (°F)     96.9 -  98       97.6 (36.4)    Heart Rate     54 -  95      90     90    Resp     18 -         18    BP     100/62 -  162/98      126/84     126/84    SpO2 (%)     94 -  98      97     97        Temp:  [96.9 °F (36.1 °C)-98 °F (36.7 °C)] 97.6 °F (36.4 °C)  Heart Rate:  [54-95] 90  Resp:  [18-22] 18  BP: (100-162)/() 126/84  Body mass index is 19.71 kg/m².      Intake/Output Summary (Last 24 hours) at 2019 173  Last data filed at 2019 0900  Gross per 24 hour   Intake --   Output 200 ml   Net -200 ml       Physical Exam   Constitutional: He is oriented to person, place, and time. He appears " well-developed and well-nourished.   Neck: No JVD present. Carotid bruit is not present.   Cardiovascular: Normal rate and regular rhythm.   No lower extremity edema   Pulmonary/Chest: Effort normal and breath sounds normal. No respiratory distress. He has no wheezes. He has no rales.   Abdominal: Soft. Bowel sounds are normal. He exhibits no distension. There is tenderness.   Neurological: He is alert and oriented to person, place, and time.   Skin: Skin is warm and dry.   Psychiatric: He has a normal mood and affect. Cognition and memory are normal.   Vitals reviewed.      Telemetry:  Sinus 70s       Results Review:     Results from last 7 days   Lab Units 11/11/19  0106 11/10/19  1724   WBC 10*3/mm3 8.24 6.13   HEMOGLOBIN g/dL 14.8 17.0   PLATELETS 10*3/mm3 231 290     Results from last 7 days   Lab Units 11/11/19  0106 11/10/19  1724   SODIUM mmol/L 144 142   POTASSIUM mmol/L 3.3* 3.3*   CHLORIDE mmol/L 108* 104   CO2 mmol/L 24.7 22.0   BUN mg/dL 10 12   CREATININE mg/dL 1.16 1.32*   CALCIUM mg/dL 8.7 9.7   GLUCOSE mg/dL 97 131*     Results from last 7 days   Lab Units 11/11/19  1042 11/11/19  0730 11/11/19  0106 11/10/19  1940 11/10/19  1724   CK TOTAL U/L  --   --   --   --  144   TROPONIN T ng/mL 0.058* 0.050* 0.041* 0.037* 0.045*       Results from last 7 days   Lab Units 11/10/19  1724   INR  0.92       I reviewed the patient's new clinical results.  I reviewed the patient's new imaging results and agree with the interpretation.  I personally viewed and interpreted the patient's EKG/Telemetry data      Medication Review:     aspirin 81 mg Oral Daily   atorvastatin 40 mg Oral Daily   bisacodyl 10 mg Rectal Daily   clopidogrel 75 mg Oral Daily   finasteride 5 mg Oral Daily   heparin (porcine) 5,000 Units Subcutaneous Q12H   [START ON 11/12/2019] hydrOXYzine 25 mg Oral Nightly   metoprolol tartrate 12.5 mg Oral BID   pantoprazole 40 mg Oral Nightly   potassium chloride 10 mEq Intravenous Q1H    sacubitril-valsartan 1 tablet Oral Daily   sodium chloride 10 mL Intravenous Q12H   tamsulosin 0.4 mg Oral Daily   thiamine 100 mg Oral Daily       sodium chloride 0.9 % with KCl 20 mEq 75 mL/hr Last Rate: 75 mL/hr (11/11/19 1153)       Assessment:  6. Elevated troponin  7. 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 History of CAD  9. Ischemic cardiomyopathy with LVEF of 36 to 40% per echocardiogram of 11/11/2019, appears compensated  10. Inguinal hernia      Recommendations:  1. Patient did have mild elevation of troponins however they do appear to be flat and are likely representative of ischemic demand versus acute kidney injury, patient's admitting creatinine was 1.32.  EKG showed ST elevation and Q waves in the anterior leads with no reciprocal changes and no active chest pain, this is likely chronic and due to an old MI.  Echocardiogram shows decreased LVEF of 36 to 40% and akinetic left ventricle.  There is no earlier echocardiogram for comparison.  This does not seem to be a new finding for the patient as he is on Entresto already.  Although not noted in the record, the patient likely has history of coronary artery disease or perhaps even a past diagnosis/treatment of MI, he is on both aspirin and Plavix at home.    2. Recommend medical management.  Patient is on guideline directed medical therapy for coronary artery disease and ischemic cardiomyopathy including aspirin, Plavix, atorvastatin, Lopressor, and Entresto.  3. With regard to patient's diastolic heart failure, he does appear compensated at this time.  BNP elevation may be falsely elevated due to elevated creatinine.  Will continue to monitor patient and adjust medications as needed.  His creatinine has now recovered.      I discussed the patients findings and my recommendations with patient and family    Sofya Damon, APRN  11/11/19  5:39 PM    Addendum:           Electronically  signed by Sofya Damon, APRN at 11/11/19 9921

## 2019-11-13 ENCOUNTER — PATIENT OUTREACH (OUTPATIENT)
Dept: CASE MANAGEMENT | Facility: OTHER | Age: 84
End: 2019-11-13

## 2019-11-13 VITALS
RESPIRATION RATE: 20 BRPM | SYSTOLIC BLOOD PRESSURE: 130 MMHG | WEIGHT: 119.6 LBS | TEMPERATURE: 98.2 F | OXYGEN SATURATION: 96 % | BODY MASS INDEX: 18.77 KG/M2 | HEART RATE: 86 BPM | HEIGHT: 67 IN | DIASTOLIC BLOOD PRESSURE: 79 MMHG

## 2019-11-13 PROBLEM — R10.9 ABDOMINAL PAIN: Status: RESOLVED | Noted: 2019-11-10 | Resolved: 2019-11-13

## 2019-11-13 LAB
ANION GAP SERPL CALCULATED.3IONS-SCNC: 13.5 MMOL/L (ref 5–15)
BASOPHILS # BLD AUTO: 0.07 10*3/MM3 (ref 0–0.2)
BASOPHILS NFR BLD AUTO: 0.8 % (ref 0–1.5)
BUN BLD-MCNC: 16 MG/DL (ref 8–23)
BUN/CREAT SERPL: 14 (ref 7–25)
CALCIUM SPEC-SCNC: 9.1 MG/DL (ref 8.6–10.5)
CHLORIDE SERPL-SCNC: 105 MMOL/L (ref 98–107)
CO2 SERPL-SCNC: 21.5 MMOL/L (ref 22–29)
CREAT BLD-MCNC: 1.14 MG/DL (ref 0.76–1.27)
DEPRECATED RDW RBC AUTO: 51.7 FL (ref 37–54)
EOSINOPHIL # BLD AUTO: 0.36 10*3/MM3 (ref 0–0.4)
EOSINOPHIL NFR BLD AUTO: 4.2 % (ref 0.3–6.2)
ERYTHROCYTE [DISTWIDTH] IN BLOOD BY AUTOMATED COUNT: 14.9 % (ref 12.3–15.4)
GFR SERPL CREATININE-BSD FRML MDRD: 61 ML/MIN/1.73
GLUCOSE BLD-MCNC: 94 MG/DL (ref 65–99)
HCT VFR BLD AUTO: 48.7 % (ref 37.5–51)
HGB BLD-MCNC: 15.8 G/DL (ref 13–17.7)
IMM GRANULOCYTES # BLD AUTO: 0.04 10*3/MM3 (ref 0–0.05)
IMM GRANULOCYTES NFR BLD AUTO: 0.5 % (ref 0–0.5)
LYMPHOCYTES # BLD AUTO: 2.44 10*3/MM3 (ref 0.7–3.1)
LYMPHOCYTES NFR BLD AUTO: 28.6 % (ref 19.6–45.3)
MAGNESIUM SERPL-MCNC: 2.2 MG/DL (ref 1.6–2.4)
MCH RBC QN AUTO: 30.5 PG (ref 26.6–33)
MCHC RBC AUTO-ENTMCNC: 32.4 G/DL (ref 31.5–35.7)
MCV RBC AUTO: 94 FL (ref 79–97)
MONOCYTES # BLD AUTO: 1.2 10*3/MM3 (ref 0.1–0.9)
MONOCYTES NFR BLD AUTO: 14.1 % (ref 5–12)
NEUTROPHILS # BLD AUTO: 4.42 10*3/MM3 (ref 1.7–7)
NEUTROPHILS NFR BLD AUTO: 51.8 % (ref 42.7–76)
NRBC BLD AUTO-RTO: 0.2 /100 WBC (ref 0–0.2)
NT-PROBNP SERPL-MCNC: 5412 PG/ML (ref 5–1800)
PLATELET # BLD AUTO: 245 10*3/MM3 (ref 140–450)
PMV BLD AUTO: 11 FL (ref 6–12)
POTASSIUM BLD-SCNC: 3.7 MMOL/L (ref 3.5–5.2)
RBC # BLD AUTO: 5.18 10*6/MM3 (ref 4.14–5.8)
SODIUM BLD-SCNC: 140 MMOL/L (ref 136–145)
WBC NRBC COR # BLD: 8.53 10*3/MM3 (ref 3.4–10.8)

## 2019-11-13 PROCEDURE — 83735 ASSAY OF MAGNESIUM: CPT | Performed by: HOSPITALIST

## 2019-11-13 PROCEDURE — 25010000002 HEPARIN (PORCINE) PER 1000 UNITS: Performed by: INTERNAL MEDICINE

## 2019-11-13 PROCEDURE — 99238 HOSP IP/OBS DSCHRG MGMT 30/<: CPT | Performed by: HOSPITALIST

## 2019-11-13 PROCEDURE — 83880 ASSAY OF NATRIURETIC PEPTIDE: CPT | Performed by: HOSPITALIST

## 2019-11-13 PROCEDURE — 85025 COMPLETE CBC W/AUTO DIFF WBC: CPT | Performed by: HOSPITALIST

## 2019-11-13 PROCEDURE — 80048 BASIC METABOLIC PNL TOTAL CA: CPT | Performed by: HOSPITALIST

## 2019-11-13 RX ORDER — TAMSULOSIN HYDROCHLORIDE 0.4 MG/1
0.4 CAPSULE ORAL DAILY
Qty: 30 CAPSULE | Refills: 0
Start: 2019-11-13 | End: 2019-12-13

## 2019-11-13 RX ORDER — FINASTERIDE 5 MG/1
5 TABLET, FILM COATED ORAL DAILY
Qty: 30 TABLET | Refills: 0
Start: 2019-11-13 | End: 2019-12-13

## 2019-11-13 RX ADMIN — METOPROLOL TARTRATE 12.5 MG: 25 TABLET, FILM COATED ORAL at 11:05

## 2019-11-13 RX ADMIN — FINASTERIDE 5 MG: 5 TABLET, FILM COATED ORAL at 11:04

## 2019-11-13 RX ADMIN — SACUBITRIL AND VALSARTAN 1 TABLET: 24; 26 TABLET, FILM COATED ORAL at 11:06

## 2019-11-13 RX ADMIN — ASPIRIN 81 MG: 81 TABLET, COATED ORAL at 11:06

## 2019-11-13 RX ADMIN — TAMSULOSIN HYDROCHLORIDE 0.4 MG: 0.4 CAPSULE ORAL at 11:04

## 2019-11-13 RX ADMIN — CLOPIDOGREL 75 MG: 75 TABLET, FILM COATED ORAL at 11:05

## 2019-11-13 RX ADMIN — ATORVASTATIN CALCIUM 40 MG: 40 TABLET, FILM COATED ORAL at 11:04

## 2019-11-13 RX ADMIN — Medication 10 MG: at 11:05

## 2019-11-13 RX ADMIN — HEPARIN SODIUM 5000 UNITS: 5000 INJECTION INTRAVENOUS; SUBCUTANEOUS at 11:05

## 2019-11-13 RX ADMIN — SODIUM CHLORIDE, PRESERVATIVE FREE 10 ML: 5 INJECTION INTRAVENOUS at 11:07

## 2019-11-13 RX ADMIN — Medication 100 MG: at 11:06

## 2019-11-13 NOTE — PLAN OF CARE
Problem: Patient Care Overview  Goal: Plan of Care Review  Outcome: Ongoing (interventions implemented as appropriate)    Goal: Individualization and Mutuality  Outcome: Ongoing (interventions implemented as appropriate)    Goal: Discharge Needs Assessment  Outcome: Ongoing (interventions implemented as appropriate)    Goal: Interprofessional Rounds/Family Conf  Outcome: Ongoing (interventions implemented as appropriate)      Problem: Fall Risk (Adult)  Goal: Identify Related Risk Factors and Signs and Symptoms  Outcome: Ongoing (interventions implemented as appropriate)      Problem: Skin Injury Risk (Adult)  Goal: Identify Related Risk Factors and Signs and Symptoms  Outcome: Ongoing (interventions implemented as appropriate)      Problem: Pain, Chronic (Adult)  Goal: Identify Related Risk Factors and Signs and Symptoms  Outcome: Ongoing (interventions implemented as appropriate)    Goal: Acceptable Pain/Comfort Level and Functional Ability   11/12/19 8281   Pain, Chronic (Adult)   Acceptable Pain/Comfort Level and Functional Ability making progress toward outcome

## 2019-11-13 NOTE — DISCHARGE PLACEMENT REQUEST
"Laurel Pastor (86 y.o. Male)     Date of Birth Social Security Number Address Home Phone MRN    05/11/1933  [address] [facility] [address] [place] [address] [phone] [id number]    Alevism Marital Status          Latter day        Admission Date Admission Type Admitting Provider Attending Provider Department, Room/Bed    11/10/19 Emergency Mallorie Oliver DO Srinivas, Priyanka, MD [address], 3315[address]S    Discharge Date Discharge Disposition Discharge Destination         Nursing Facility (DC - External)              Attending Provider:  Nabila Lassiter MD    Allergies:  Penicillins    Isolation:  None   Infection:  None   Code Status:  No CPR    Ht:  170 cm (66.93\")   Wt:  54.3 kg (119 lb 9.6 oz)    Admission Cmt:  None   Principal Problem:  None                Active Insurance as of 11/10/2019     Primary Coverage     Payor Plan Insurance Group Employer/Plan Group    MEDICARE MEDICARE A & B      Payor Plan Address Payor Plan Phone Number Payor Plan Fax Number Effective Dates    [address] [phone]  5/1/1998 - None Entered    [place] [address]       Subscriber Name Subscriber Birth Date Member ID       LAUREL PASTOR 5/11/1933 [id number]                 Emergency Contacts      (Rel.) Home Phone Work Phone Mobile Phone    Vj Pastor (Son) [phone] -- [phone]            Emergency Contact Information     Name Relation Home Work Mobile    Vj Pastor Son [phone]          Insurance Information                MEDICARE/MEDICARE A & B Phone: [phone]    Subscriber: Laurel Pastor Subscriber#: [id number]    Group#:  Precert#:           Treatment Team     Provider Relationship Specialty Contact    Nabila Lassiter MD Attending, Physician of Record Hospitalist [phone]    Artis Lan MD Consulting Physician Interventional Cardiology [phone]    Huyen Alcantar RN Registered Nurse -- [phone]    Divine Peters, PCT " Patient Care Technician --     Flex Clark MD Consulting Physician, Surgeon General Surgery [phone]    Shruthi Rivers, RRT Respiratory Therapist -- [phone]    Mj Huerta MD Consulting Physician Urology [phone]          Problem List           Codes Noted - Resolved       Non-Hospital    Anemia ICD-10-CM: D64.9  ICD-9-CM: 285.9 11/10/2016 - Present    Iron deficiency anemia ICD-10-CM: D50.9  ICD-9-CM: 280.9 2016 - Present    Anemia due to vitamin B12 deficiency ICD-10-CM: D51.9  ICD-9-CM: 281.1 2016 - Present    Hypokalemia ICD-10-CM: E87.6  ICD-9-CM: 276.8 2016 - Present    Late onset 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 (CMS/HCC) ICD-10-CM: G30.1, F02.80  ICD-9-CM: 331.0, 294.10 2016 - Present    Malnutrition (CMS/HCC) ICD-10-CM: E46  ICD-9-CM: 263.9 2016 - Present    Colitis ICD-10-CM: K52.9  ICD-9-CM: 558.9 2016 - Present    Abnormal CT of the abdomen ICD-10-CM: R93.5  ICD-9-CM: 793.6 2016 - Present    Elevated LFTs ICD-10-CM: R94.5  ICD-9-CM: 790.6 2016 - Present    Dysphagia ICD-10-CM: R13.10  ICD-9-CM: 787.20 2016 - Present    Esophageal stricture ICD-10-CM: K22.2  ICD-9-CM: 530.3 6/15/2016 - Present    Hypertension ICD-10-CM: I10  ICD-9-CM: 401.9 6/15/2016 - Present    Chronic back pain ICD-10-CM: M54.9, G89.29  ICD-9-CM: 724.5, 338.29 6/15/2016 - Present    Weight loss ICD-10-CM: R63.4  ICD-9-CM: 783.21 6/15/2016 - Present    Dementia (CMS/HCC) ICD-10-CM: F03.90  ICD-9-CM: 294.20 6/15/2016 - Present             History & Physical      Mallorie Oliver DO at 11/10/19 2329          Hospitalist History and Physical    Patient Identification:  Name: Demarcus Pastor  Age/Sex: 86 y.o. male  :  1933  MRN: [id number]         Primary Care Physician: Marcus Adames MD    Chief Complaint   Patient presents with   • Chest Pain   • Groin Pain       History of Present Illness  Patient is a 86 y.o. 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    Apparently, [place] to the emergency department notes, the patient was very sore in his groin upon presentation.  The patient reportedly told the emergency department physician that he had left groin pain for a few days.  Otherwise the patient did not have any other complaints at that time.  Later on during the patient's emergency department visit, nursing staff reported that the patient complained of chest pain.    On my exam, the patient is resting comfortably in bed.  History is limited by his speech difficulties.  The patient currently denies chest pain.  He denies any abdominal pain and/or groin pain.  He denies recent cough.  He denies recent shortness of air.  He denies any recent vomiting.    In the emergency department, patient had a CT scan of the abdomen and pelvis without contrast that revealed bilateral fat-containing inguinal hernias.  There was no evidence for incarceration or strangulation.  He was also found to have some multiple air-fluid levels with prominent small bowel loops thought to be related to an ileus versus early or low-grade bowel obstruction.  It was also found to have sigmoid diverticulosis.  There was biliary air noted that was thought to be related to a prior sphincterotomy.  Patient is status post cholecystectomy.  Patient also had an ultrasound of his testicles which revealed that both testicles were normal.  He had a 2.7 cm simple right epididymal head cyst.  And was noted small left-sided fat-containing inguinal hernia that appeared stable from 2016.  Patient had a lactic acid level of 3.6 upon arrival to the emergency department.  The initial troponin T level was  0.045.  proBNP was 6150.  CMP was remarkable for potassium of 3.3, creatinine 1.32 and an anion gap of 16.  TSH was mildly elevated at 5.230.  CBC was unremarkable with the exception of hemoglobin of 17 and hematocrit of 49.9.    The patient noted to have EKGs which revealed sinus rhythm with T wave changes mostly in the lateral leads.  Patient was evaluated by cardiology in the emergency department recommended an echocardiogram in the morning with ongoing medical management.    Patient has been admitted to the telemetry unit for further evaluation and management.    Present during visit: BREE Shah    Past History:  Past Medical History:   Diagnosis Date   • Abnormal CT of the abdomen 6/19/2016   • Colitis 6/19/2016   • CVA (cerebral vascular accident) (CMS/HCC)    • History of transfusion    • Hyperlipidemia    • Hypertension    • Pancreatitis      Past Surgical History:   Procedure Laterality Date   • COLONOSCOPY N/A 11/9/2016    Procedure: COLONOSCOPY CPT CODE: 03162;  Surgeon: Lilliam Wilson DO;  Location: [facility] OR;  Service:    • ENDOSCOPY N/A 6/17/2016    Procedure: ESOPHAGOGASTRODUODENOSCOPY;  Surgeon: Alex Robles III, MD;  Location: [facility] OR;  Service:    • ENDOSCOPY N/A 11/9/2016    Procedure: ESOPHAGOGASTRODUODENOSCOPY WITH BIOPSY CPT CODE: 42132;  Surgeon: Lilliam Wilson DO;  Location: [facility] OR;  Service:    • ESOPHAGEAL DILATATION       Family History   Problem Relation Age of Onset   • Stroke Mother    • Heart disease Brother      Social History     Tobacco Use   • Smoking status: Never Smoker   • Smokeless tobacco: Never Used   Substance Use Topics   • Alcohol use: No   • Drug use: No     Medications Prior to Admission   Medication Sig Dispense Refill Last Dose   • aspirin 81 MG EC tablet Take 81 mg by mouth Daily.   11/10/2019 at 0800   • atorvastatin (LIPITOR) 40 MG tablet Take 40 mg by mouth Daily.   11/9/2019 at 2000   • clopidogrel (PLAVIX) 75 MG tablet Take 75 mg by mouth  Daily.   11/10/2019 at 0800   • hydrOXYzine (ATARAX) 25 MG tablet Take 25 mg by mouth Every Night.   11/9/2019 at 2000   • metoprolol tartrate (LOPRESSOR) 25 MG tablet Take 12.5 mg by mouth 2 (Two) Times a Day.   11/10/2019 at 1600   • pantoprazole (PROTONIX) 40 MG EC tablet Take 40 mg by mouth Every Night.   11/9/2019 at 2000   • sacubitril-valsartan (ENTRESTO) 24-26 MG tablet Take 1 tablet by mouth Daily.   11/10/2019 at 0800   • thiamine (VITAMIN B-1) 100 MG tablet Take 100 mg by mouth Daily.   11/10/2019 at 0800     Allergies: Penicillins    Review of Systems:  Review of Systems   Respiratory: Negative for shortness of breath.    Cardiovascular: Negative for chest pain.   Gastrointestinal: Negative for abdominal pain and vomiting.   Genitourinary: Negative for testicular pain.     Complete ROS difficult to obtain due to expressive aphasia.    Vital Signs  Temp:  [97.9 °F (36.6 °C)] 97.9 °F (36.6 °C)  Heart Rate:  [73-95] 95  Resp:  [20-22] 20  BP: (149-158)/() 152/100  Body mass index is 21.93 kg/m².    Physical Exam:  Physical Exam   Constitutional: He appears well-developed and well-nourished. No distress. Nasal cannula in place.   Thin, chronically ill-appearing and in no acute distress.   HENT:   Head: Normocephalic and atraumatic.   Mouth/Throat: Oropharynx is clear and moist.   Eyes: Conjunctivae and EOM are normal.   Neck: Neck supple. No tracheal deviation present.   Cardiovascular: Normal rate and regular rhythm. Exam reveals no gallop and no friction rub.   No murmur heard.  Pulmonary/Chest: Breath sounds normal. No respiratory distress. He has no wheezes. He has no rales.   Abdominal: Soft. Bowel sounds are normal. He exhibits no distension. There is no hepatomegaly. There is no tenderness. There is no guarding. A hernia is present. Hernia confirmed positive in the left inguinal area (Not reducible, not painful to palpation.).   Musculoskeletal: Normal range of motion. He exhibits no tenderness.    Neurological: He is alert.   Right hand contracture.     Skin: Skin is warm and dry. No rash noted. There is erythema (Bilateral feet and distal extremities; likely due to crossing his legs.).   Psychiatric: He has a normal mood and affect.      Results Review:    Results from last 7 days   Lab Units 11/10/19  1724   WBC 10*3/mm3 6.13   HEMOGLOBIN g/dL 17.0   HEMATOCRIT % 49.9   PLATELETS 10*3/mm3 290     Results from last 7 days   Lab Units 11/10/19  1724   SODIUM mmol/L 142   POTASSIUM mmol/L 3.3*   CHLORIDE mmol/L 104   CO2 mmol/L 22.0   BUN mg/dL 12   CREATININE mg/dL 1.32*   CALCIUM mg/dL 9.7   GLUCOSE mg/dL 131*     Results from last 7 days   Lab Units 11/10/19  1724   BILIRUBIN mg/dL 0.7   ALK PHOS U/L 70   AST (SGOT) U/L 32   ALT (SGPT) U/L 22         Results from last 7 days   Lab Units 11/10/19  1724   MAGNESIUM mg/dL 2.0     Results from last 7 days   Lab Units 11/10/19  1940 11/10/19  1724   CK TOTAL U/L  --  144   TROPONIN T ng/mL 0.037* 0.045*     Results from last 7 days   Lab Units 11/10/19  1724   INR  0.92       Imaging:    I have personally reviewed the EKG. Multiple EKGs reviewed; SR with prolonged QTc, mild ST elevation in anterior leads; T wave abnormalities noted in the lateral leads.    CT images reviewed; left inguinal hernia noted; otherwise no acute abnormalities noted.    Imaging Results (Most Recent)     Procedure Component Value Units Date/Time    US Scrotum & Testicles [[id number]] Collected:  11/10/19 1916     Updated:  11/10/19 1918    Narrative:       US Scrotum and Contents    INDICATION:  Left-sided groin and scrotal pain. Suspected left-sided hernia    TECHNIQUE:   Sonographic evaluation of the scrotum. Color and pulsed wave doppler ultrasound was used to assess testicular vasculature.    COMPARISON:   CT abdomen and pelvis 11/7/2016 and 11/10/2019    FINDINGS:  The right testicle is 2.6 x 4.1 x 2.5 cm. It is normal in appearance with some dilated rete testes visible. There is a  cyst superior to the testicle consistent with an epididymal cyst. It measures 2.7 cm in diameter. This is a simple cyst. There is  normal flow in the right testicle. Left testicle measures 2.2 x 3.8 x 3.1 cm and has normal flow and normal echotexture. The left groin was evaluated and some hypoechoic longitudinally oriented material is visible. The CT scan done approximately same  time may show some fat within the inguinal canal hernia. No bowel involvement is noted.      Impression:       Both testicles are normal    2.7 cm simple right epididymal head cyst    The ultrasound suggests that there is some fatty material in the inguinal canal with different echotexture than the adjacent fatty tissue and on the CT scan there may be a small amount of fat extending down the inguinal canal to the patient may have a  small fat-containing inguinal hernia which appears unchanged from the 2016 CT scan.    Please note that the initial images on the study are incorrectly labeled right groin when they actually represent the left groin according to the technologist. She is going to re-labeled those images    Signer Name: Oleg Garcia MD   Signed: 11/10/2019 7:16 PM   Workstation Name: [email or web address]    [facility]    XR Chest 1 View [[id number]] Collected:  11/10/19 1839     Updated:  11/10/19 1842    Narrative:       CR Chest 1 Vw    INDICATION:   Chest pain     COMPARISON:    Chest x-ray 11/7/2016    FINDINGS:  Single portable AP view(s) of the chest.  Postoperative changes left upper quadrant. Cardiac silhouette size is top normal. Atherosclerotic carotid vascular calcification noted at the aortic knob. The thoracic aorta is at least ectatic and the patient is  mildly progressed from the prior study. There is some bibasilar atelectasis and mild prominence of interstitial markings in general. No pneumothorax.      Impression:         1. Ectasia of the thoracic aorta mildly progressed from prior study.  2. Mild  bibasilar atelectasis and prominence of interstitial markings in general.    Signer Name: Dorothy Coronado MD   Signed: 11/10/2019 6:39 PM   Workstation Name: [email or web address]    [facility]    CT Abdomen Pelvis Without Contrast [[id number]] Collected:  11/10/19 1838     Updated:  11/10/19 1840    Narrative:       INDICATION:   Concern for strangulated inguinal hernia. Patient is unable to provide a history.    TECHNIQUE:   CT of the abdomen and pelvis without contrast. Coronal and sagittal reconstructions were obtained.  Radiation dose reduction techniques included automated exposure control or exposure modulation based on body size. Radiation audit for number of CT and  nuclear cardiology exams performed in the last year: 0.      COMPARISON:   Abdomen pelvis CT scan 11/2/2016.    FINDINGS:  Lung bases: Dependent atelectasis. There are again densities left lung base/chest wall consistent with old surgical clips. This is unchanged and results in some artifact. There are coronary artery calcifications. There is a small hiatal hernia.    Abdomen: Lack of intravenous contrast media limits the study.    There is a streak artifact since the patient is unable to raise his arms. Intrahepatic biliary air is present as is air in the common bile duct. Please correlate for history of sphincterotomy. The patient is postcholecystectomy. The noncontrast liver is  otherwise grossly unremarkable allowing for the artifact from the arms. There is apparently been a splenectomy previously. The right adrenal gland is unremarkable. There is stable thickening of the left adrenal gland. There is no intrarenal calculus or  hydronephrosis. There are vascular calcifications involving the abdominal aorta with ectasia but no abdominal aortic aneurysm. 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  Noncontrast pancreas is not well seen and likely  atrophic distally.    Visualized appendix is unremarkable.    There are multiple air-fluid levels within prominent small bowel loops but there is no definite transition point. Etiology not clear. This could reflect mild ileus. Early or low-grade obstruction not excluded. Follow-up recommended.    There are degenerative changes in the lumbar spine. There is no percutaneously drainable fluid collection or free intraperitoneal air appreciated.    Pelvis: Prostate gland is large measuring about 6 cm diameter. There are subtle densities dependently in the bladder concerning for small stones. Stones were also seen previously.    There is a large amount of gas and stool in the rectum and there is sigmoid diverticulosis. There is no CT evidence for acute diverticulitis.     There are fat-containing inguinal hernias bilaterally. There is no bowel appreciated within either the right or left inguinal hernia. Sigmoid colon protrudes to the orifice of the left inguinal hernia area      Impression:         1. There are fat-containing inguinal hernias bilaterally. There is no evidence for incarcerated bowel.  2. Multiple air-fluid levels within prominent small bowel loops. No transition point is appreciated. This is quite nonspecific and likely related to ileus. Early or low-grade obstruction not excluded. Follow-up recommended.  3. Please note the study is limited by technical factors and lack of contrast media. Prostate gland is enlarged and small bladder calculi are again seen.  5. Patient's post cholecystectomy and there is biliary air probably due to prior sphincterotomy. Please confirm clinically.  6. Post splenectomy.  7. Sigmoid diverticulosis without evidence for diverticulitis.  8. Atherosclerotic vascular disease.        Signer Name: Dorothy Coronado MD   Signed: 11/10/2019 6:38 PM   Workstation Name: [email or web address]    [facility]          Assessment/Plan     -Reported abdominal pain/left groin pain that  may be related to an inguinal hernia: No strangulation or ulceration noted per CT imaging.  She is currently nothing by mouth.  I will place him on gentle IV fluids and as needed pain and nausea medication.  General surgery was contacted from the emergency department; will make an official consultation for further evaluation.    -SIRS criteria with lactic acidosis but no obvious source of infection: Will repeat lactic acid level after IV fluids. Will hold on IV antibiotics. Repeat CBC and lactic acid level again in am. Monitor temperature curve.     -Mild troponin T elevation with nonspecific EKG changes: Patient seen by cardiology in the emergency department.  She has received aspirin.  Patient has been placed on telemetry.  Continue to trend his troponin T levels.  Obtain an echocardiogram.  Currently awaiting patient's home medication list.    -Mild TSH elevation: Obtain free T4.    -History of cerebrovascular accident with expressive aphasia: Supportive care.  Currently awaiting patient's home medication list.  Patient is on a puréed diet at the nursing home with no milk or tea.    -Elevated anion gap: Patient currently n.p.o.  Continue gentle IV fluid hydration.  Repeat chemistry panel in a.m.    -Elevated proBNP level: Clinically patient does not appear volume overloaded.  No documented history of CHF.  I will order an echocardiogram in a.m.    DVT prophylaxis: Subcutaneous heparin    Estimated Length of Stay: > 2 MNs    Code: DNR per review of nursing home records    Patient is considered to be a high risk patient due to: Advanced age, history of CVA and hypertension EKG changes and lactic acidosis    I discussed the patients findings and my recommendations with patient and nursing staff    Mallorie Oliver DO  11/11/19  12:01 AM    Electronically signed by Mallorie Oliver DO at 11/11/19 0023       Vital Signs (last day)     Date/Time   Temp   Temp src   Pulse   Resp   BP   Patient Position   SpO2     11/13/19 1029   97.7 (36.5)   Axillary   67   20   138/79   Lying   95    11/13/19 0634   98.6 (37)   Oral   77   20   143/90   Lying   99    11/13/19 0338   98.1 (36.7)   Oral   60   20   117/81   Lying   94    11/12/19 1815   --   --   --   --   --   --   95    11/12/19 1811   98.5 (36.9)   Oral   90   20   132/73   Lying   95    11/12/19 1412   --   --   97   19   127/75   Lying   98    11/12/19 1033   98.5 (36.9)   Oral   77   20   123/76   Lying   97    11/12/19 0953   --   --   --   --   --   --   98    11/12/19 0610   98.4 (36.9)   Oral   82   20   129/60   Lying   98    11/12/19 0251   98.8 (37.1)   Axillary   90   20   151/78   Lying   99              Lines, Drains & Airways    Active LDAs     Name:   Placement date:   Placement time:   Site:   Days:    Urethral Catheter 18 Fr.   11/11/19    1600     1                Hospital Medications (active)       Dose Frequency Start End    aspirin EC tablet 81 mg 81 mg Daily 11/11/2019     Sig - Route: Take 1 tablet by mouth Daily. - Oral    atorvastatin (LIPITOR) tablet 40 mg 40 mg Daily 11/11/2019     Sig - Route: Take 1 tablet by mouth Daily. - Oral    bisacodyl (DULCOLAX) suppository 10 mg 10 mg Daily 11/11/2019     Sig - Route: Insert 1 suppository into the rectum Daily. - Rectal    clopidogrel (PLAVIX) tablet 75 mg 75 mg Daily 11/11/2019     Sig - Route: Take 1 tablet by mouth Daily. - Oral    finasteride (PROSCAR) tablet 5 mg 5 mg Daily 11/11/2019     Sig - Route: Take 1 tablet by mouth Daily. - Oral    heparin (porcine) 5000 UNIT/ML injection 5,000 Units 5,000 Units Every 12 Hours Scheduled 11/11/2019     Sig - Route: Inject 1 mL under the skin into the appropriate area as directed Every 12 (Twelve) Hours. - Subcutaneous    hydrOXYzine (ATARAX) tablet 25 mg 25 mg Nightly 11/12/2019     Sig - Route: Take 1 tablet by mouth Every Night. - Oral    metoprolol tartrate (LOPRESSOR) tablet 12.5 mg 12.5 mg 2 Times Daily 11/11/2019     Sig - Route: Take 0.5 tablets  "by mouth 2 (Two) Times a Day. - Oral    morphine injection 0.5 mg 0.5 mg Every 4 Hours PRN 11/11/2019 11/21/2019    Sig - Route: Infuse 0.25 mL into a venous catheter Every 4 (Four) Hours As Needed for Severe Pain . - Intravenous    nitroglycerin (NITROSTAT) SL tablet 0.4 mg 0.4 mg Every 5 Minutes PRN 11/10/2019     Sig - Route: Place 1 tablet under the tongue Every 5 (Five) Minutes As Needed for Chest Pain (Only if SBP Greater Than 100). - Sublingual    pantoprazole (PROTONIX) EC tablet 40 mg 40 mg Nightly 11/11/2019     Sig - Route: Take 1 tablet by mouth Every Night. - Oral    sacubitril-valsartan (ENTRESTO) 24-26 MG tablet 1 tablet 1 tablet Daily 11/11/2019     Sig - Route: Take 1 tablet by mouth Daily. - Oral    sodium chloride 0.9 % flush 10 mL 10 mL As Needed 11/10/2019     Sig - Route: Infuse 10 mL into a venous catheter As Needed for Line Care. - Intravenous    Linked Group 1:  \"And\" Linked Group Details        sodium chloride 0.9 % flush 10 mL 10 mL Every 12 Hours Scheduled 11/11/2019     Sig - Route: Infuse 10 mL into a venous catheter Every 12 (Twelve) Hours. - Intravenous    sodium chloride 0.9 % flush 10 mL 10 mL As Needed 11/10/2019     Sig - Route: Infuse 10 mL into a venous catheter As Needed for Line Care. - Intravenous    tamsulosin (FLOMAX) 24 hr capsule 0.4 mg 0.4 mg Daily 11/11/2019     Sig - Route: Take 1 capsule by mouth Daily. - Oral    thiamine (VITAMIN B-1) tablet 100 mg 100 mg Daily 11/11/2019     Sig - Route: Take 1 tablet by mouth Daily. - Oral            Lab Results (last 24 hours)     Procedure Component Value Units Date/Time    BNP [[id number]]  (Abnormal) Collected:  11/13/19 0428    Specimen:  Blood Updated:  11/13/19 0927     proBNP 5,412.0 pg/mL     Narrative:       Among patients with dyspnea, NT-proBNP is highly sensitive for the detection of acute congestive heart failure. In addition NT-proBNP of <300 pg/ml effectively rules out acute congestive heart failure with 99% negative " predictive value.    Basic Metabolic Panel [[id number]]  (Abnormal) Collected:  11/13/19 0428    Specimen:  Blood Updated:  11/13/19 0521     Glucose 94 mg/dL      BUN 16 mg/dL      Creatinine 1.14 mg/dL      Sodium 140 mmol/L      Potassium 3.7 mmol/L      Chloride 105 mmol/L      CO2 21.5 mmol/L      Calcium 9.1 mg/dL      eGFR Non African Amer 61 mL/min/1.73      BUN/Creatinine Ratio 14.0     Anion Gap 13.5 mmol/L     Narrative:       GFR Normal >60  Chronic Kidney Disease <60  Kidney Failure <15    Magnesium [[id number]]  (Normal) Collected:  11/13/19 0428    Specimen:  Blood Updated:  11/13/19 0520     Magnesium 2.2 mg/dL     CBC & Differential [[id number]] Collected:  11/13/19 0428    Specimen:  Blood Updated:  11/13/19 0458    Narrative:       The following orders were created for panel order CBC & Differential.  Procedure                               Abnormality         Status                     ---------                               -----------         ------                     CBC Auto Differential[[id number]]        Abnormal            Final result                 Please view results for these tests on the individual orders.    CBC Auto Differential [[id number]]  (Abnormal) Collected:  11/13/19 0428    Specimen:  Blood Updated:  11/13/19 0458     WBC 8.53 10*3/mm3      RBC 5.18 10*6/mm3      Hemoglobin 15.8 g/dL      Hematocrit 48.7 %      MCV 94.0 fL      MCH 30.5 pg      MCHC 32.4 g/dL      RDW 14.9 %      RDW-SD 51.7 fl      MPV 11.0 fL      Platelets 245 10*3/mm3      Neutrophil % 51.8 %      Lymphocyte % 28.6 %      Monocyte % 14.1 %      Eosinophil % 4.2 %      Basophil % 0.8 %      Immature Grans % 0.5 %      Neutrophils, Absolute 4.42 10*3/mm3      Lymphocytes, Absolute 2.44 10*3/mm3      Monocytes, Absolute 1.20 10*3/mm3      Eosinophils, Absolute 0.36 10*3/mm3      Basophils, Absolute 0.07 10*3/mm3      Immature Grans, Absolute 0.04 10*3/mm3      nRBC 0.2 /100 WBC     Blood Culture - Blood, Arm,  Right [[id number]] Collected:  11/10/19 1832    Specimen:  Blood from Arm, Right Updated:  11/12/19 1845     Blood Culture No growth at 2 days    Blood Culture - Blood, Arm, Left [[id number]] Collected:  11/10/19 1741    Specimen:  Blood from Arm, Left Updated:  11/12/19 1800     Blood Culture No growth at 2 days        Orders (last 24 hrs)     Start     Ordered    11/13/19 1109  Discharge patient  Once     Comments:  [facility]    11/13/19 1108    11/13/19 1109  Discontinue IV  Once      11/13/19 1108    11/13/19 0856  Continue Indwelling Urinary Catheter  Once     Comments:  Chronic retention    11/13/19 0856    11/13/19 0856  Assess Need for Indwelling Urinary Catheter - Follow Removal Protocol  Continuous     Comments:  Indwelling Urinary Catheter Removal Criteria  Discontinue Indwelling Urinary Catheter Unless One of the Following is Present:  Urinary Retention or Obstruction  Chronic Urinary Catheter Use  End of Life  Critical Illness with Strict I/O   Tract or Abdominal Surgery  Stage 3/4 Sacral / Perineal Wound  Required Activity Restriction: Trauma  Required Activity Restriction: Spine Surgery  If Patient is Being Followed by Urology Contact Them PRIOR to Removal  Do Not Remove Indwelling Urinary Catheter Order is Present with a CLINICAL REASON to Maintain the Catheter. Provider is Required to Include a Clinical Reason to Maintain a Urinary Catheter    Patient Admitted With Indwelling Urinary Catheter (Not Placed at [facility])  Assess for Continued Need & Document Medical Necessity  If Infection is Suspected, Contact the Provider        11/13/19 0856    11/13/19 0856  Urinary Catheter Care  Every Shift      11/13/19 0856    11/13/19 0836  BNP  Once      11/13/19 0835    11/13/19 0600  Basic Metabolic Panel  Morning Draw      11/12/19 1000    11/13/19 0600  Magnesium  Morning Draw      11/12/19 1000    11/13/19 0600  CBC & Differential  Morning Draw      11/12/19 1453    11/13/19 0600  CBC  Auto Differential  PROCEDURE ONCE      11/13/19 0002    11/13/19 0000  finasteride (PROSCAR) 5 MG tablet  Daily      11/13/19 1108    11/13/19 0000  tamsulosin (FLOMAX) 0.4 MG capsule 24 hr capsule  Daily      11/13/19 1108    11/13/19 0000  Discharge Follow-up with PCP      11/13/19 1108    11/13/19 0000  Discharge Follow-up with Specialty: Dr. Francisco; 2 Weeks      11/13/19 1108    11/13/19 0000  Discharge Follow-up with Specified Provider: Surgery Dr. Clark; 2 Weeks      11/13/19 1108    11/13/19 0000  Diet: Cardiac      11/13/19 1108    11/13/19 0000  Activity as Tolerated      11/13/19 1108    11/13/19 0000  CBC & Differential     Comments:  Fax report to Dr. Adames      11/13/19 1143    11/12/19 2100  hydrOXYzine (ATARAX) tablet 25 mg  Nightly      11/11/19 0121    11/11/19 1800  tamsulosin (FLOMAX) 24 hr capsule 0.4 mg  Daily      11/11/19 1611    11/11/19 1800  finasteride (PROSCAR) tablet 5 mg  Daily      11/11/19 1612    11/11/19 1242  morphine injection 0.5 mg  Every 4 Hours PRN      11/11/19 1242    11/11/19 1100  bisacodyl (DULCOLAX) suppository 10 mg  Daily      11/11/19 1033    11/11/19 0900  aspirin EC tablet 81 mg  Daily      11/11/19 0121    11/11/19 0900  atorvastatin (LIPITOR) tablet 40 mg  Daily      11/11/19 0121    11/11/19 0900  clopidogrel (PLAVIX) tablet 75 mg  Daily      11/11/19 0121    11/11/19 0900  metoprolol tartrate (LOPRESSOR) tablet 12.5 mg  2 Times Daily      11/11/19 0121    11/11/19 0900  sacubitril-valsartan (ENTRESTO) 24-26 MG tablet 1 tablet  Daily      11/11/19 0121    11/11/19 0900  thiamine (VITAMIN B-1) tablet 100 mg  Daily      11/11/19 0121    11/11/19 0215  pantoprazole (PROTONIX) EC tablet 40 mg  Nightly      11/11/19 0121    11/11/19 0015  sodium chloride 0.9 % flush 10 mL  Every 12 Hours Scheduled      11/10/19 2326    11/11/19 0015  heparin (porcine) 5000 UNIT/ML injection 5,000 Units  Every 12 Hours Scheduled      11/10/19 2326    11/10/19 2326  sodium chloride  0.9 % flush 10 mL  As Needed      11/10/19 2326    11/10/19 2326  nitroglycerin (NITROSTAT) SL tablet 0.4 mg  Every 5 Minutes PRN      11/10/19 2326    11/10/19 1711  sodium chloride 0.9 % flush 10 mL  As Needed      11/10/19 1711    Unscheduled  Telemetry - Pulse Oximetry  Continuous PRN     Comments:  If Patient Develops Unresponsiveness, Acute Dyspnea, Cyanosis or Suspected Hypoxemia Start Continuous Pulse Ox Monitoring, Apply Oxygen & Notify Provider    11/10/19 2326    Unscheduled  Oxygen Therapy- Nasal Cannula; Titrate for SPO2: 90% - 95%  Continuous PRN     Comments:  If Patient Develops Unresponsiveness, Acute Dyspnea, Cyanosis or Suspected Hypoxemia Start Continuous Pulse Ox Monitoring, Apply Oxygen & Notify Provider    11/10/19 2326    Unscheduled  ECG 12 Lead  As Needed     Comments:  Nurse to Release if Patient Expericences Acute Chest Pain or Dysrhythmias    11/10/19 2326    Unscheduled  Potassium  As Needed     Comments:  For Ventricular Arrhythmias      11/10/19 2326    Unscheduled  Magnesium  As Needed     Comments:  For Ventricular Arrhythmias      11/10/19 2326    Unscheduled  Troponin  As Needed     Comments:  For Chest Pain      11/10/19 2326    Unscheduled  Digoxin Level  As Needed     Comments:  For Atrial Arrhythmias      11/10/19 2326    Unscheduled  Blood Gas, Arterial  As Needed     Comments:  Per O2 PolicyNotify Physician      11/10/19 2326    Unscheduled  Apply Moisture Barrier After Any Incontinence  As Needed      11/11/19 1112    Unscheduled  Up With Assistance  As Needed      11/12/19 1206    --  aspirin 81 MG EC tablet  Daily      11/10/19 2334    --  atorvastatin (LIPITOR) 40 MG tablet  Daily      11/10/19 2334    --  clopidogrel (PLAVIX) 75 MG tablet  Daily      11/10/19 2334    --  hydrOXYzine (ATARAX) 25 MG tablet  Nightly      11/10/19 2334    --  pantoprazole (PROTONIX) 40 MG EC tablet  Nightly      11/10/19 2334    --  metoprolol tartrate (LOPRESSOR) 25 MG tablet  2 Times  Daily      11/10/19 2334    --  sacubitril-valsartan (ENTRESTO) 24-26 MG tablet  Daily      11/10/19 2334    --  thiamine (VITAMIN B-1) 100 MG tablet  Daily      11/10/19 2334    Pending  aspirin EC tablet 81 mg  Daily      Pending    Pending  atorvastatin (LIPITOR) tablet 40 mg  Daily      Pending    Pending  clopidogrel (PLAVIX) tablet 75 mg  Daily      Pending    Pending  hydrOXYzine (ATARAX) tablet 25 mg  Nightly      Pending    Pending  pantoprazole (PROTONIX) EC tablet 40 mg  Nightly      Pending    Pending  metoprolol tartrate (LOPRESSOR) tablet 12.5 mg  Every 12 Hours Scheduled      Pending    Pending  sacubitril-valsartan (ENTRESTO) 24-26 MG tablet 1 tablet  Daily      Pending    Pending  thiamine (VITAMIN B-1) tablet 100 mg  Daily      Pending    Pending  Opioid Administration - Continuous Pulse Oximetry (SpO2) Monitoring  Continuous      Pending    Pending  Opioid Administration - Document SpO2 Value With Each Set of Vitals & Any Change in Patient Status  Continuous      Pending    Pending  Opioid Administration - Notify Provider Pulse Oximetry (SpO2)  Until Discontinued      Pending          Physician Progress Notes (last 24 hours) (Notes from 11/12/19 1351 through 11/13/19 1351)     No notes of this type exist for this encounter.        Consult Notes (last 24 hours) (Notes from 11/12/19 1351 through 11/13/19 1351)     No notes of this type exist for this encounter.        Physical Therapy Notes (last 24 hours) (Notes from 11/12/19 1351 through 11/13/19 1351)     No notes of this type exist for this encounter.        Occupational Therapy Notes (last 24 hours) (Notes from 11/12/19 1351 through 11/13/19 1351)     No notes of this type exist for this encounter.          Discharge Summary     No notes of this type exist for this encounter.        Discharge Order (From admission, onward)    Start     Ordered    11/13/19 1109  Discharge patient  Once     Comments:  [facility]   Expected Discharge  Date:  11/13/19    Discharge Disposition:  Nursing Facility (DC - External)    Physician of Record for Attribution - Please select from Treatment Team:  JUDIT RAMOS [[id number]]    Review needed by CMO to determine Physician of Record:  No       Question Answer Comment   Physician of Record for Attribution - Please select from Treatment Team JUDIT RAMOS    Review needed by CMO to determine Physician of Record No        11/13/19 1108

## 2019-11-13 NOTE — NURSING NOTE
Patient spit out his oral medication and is refusing to take all meds by mouth.  Tried to explain to patient the importance of taking his medications and he continues to refuse.  No other attempts where made to get the patient to take his meds.

## 2019-11-13 NOTE — PROGRESS NOTES
Discharge Planning Assessment   D[facility]     Patient Name: Demarcus Pastor  MRN: [id number]  Today's Date: 11/13/2019    Admit Date: 11/10/2019      Discharge Plan     Row Name 11/13/19 1441       Plan    Final Discharge Disposition Code  03 - skilled nursing facility (SNF)    Final Note  Pt to be discharged back to [facility] on this date.  Facility aware and agreeable per Lien.  Pt son aware and agreeable.  Pt to be transported via [facility].          Destination      Service Provider Request Status Selected Services Address Phone Number Fax Number    [facility] - Request Sent N/A [address] [address] [phone]       KINGSLEY HoW

## 2019-11-13 NOTE — OUTREACH NOTE
Care Coordination Note    SNF admission notification received via Patient PING.  Patient noted as d/c to [facility] from [facility].  Patient is LTC resident at the facility with care reestablished upon acute discharge.  No HRCM outreach needed.    Nlya Yang RN  Community Care Coordinator    11/13/2019, 3:32 PM

## 2019-11-13 NOTE — NURSING NOTE
Report given to [facility], Spoke with Paige Maria LPN.  No questions at this time.  Will follow up if needed.  All appointments have been scheduled for follow up.  No further information required at this time.

## 2019-11-13 NOTE — DISCHARGE SUMMARY
[facility] DISCHARGE SUMMARY    Patient Identification:  Name:  Demarcus Pastor  Age:  86 y.o.  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 Pastor is a 86 y.o. male nursing home resident with past medical history significant for ported CVA with expressive aphasia, retention and hyperlipidemia who presents to the emergency department abdominal pain and groin pain.  Please see the admitting history and physical for further details.  In the emergency department, patient had a CT scan of the abdomen and pelvis without contrast that revealed bilateral fat-containing inguinal hernias.  There was no evidence for incarceration or strangulation.  He was also found to have some multiple air-fluid levels with prominent small bowel loops thought to be related to an ileus versus early or low-grade bowel obstruction.  It was also found to have sigmoid diverticulosis.   Patient also had an ultrasound of his testicles which revealed that both testicles were normal. And was noted small left-sided fat-containing inguinal hernia that appeared stable from .  Patient was admitted with diagnosis of abdominal pain.  Surgery consultation done for evaluation of inguinal hernia and advised no surgical intervention.  Patient was noted to have minimally elevated troponin and elevated BNP at the time of admission.  Troponins remained flat trend and no chest pain.  Cardiology consulted and advised no  further intervention.  2D echocardiogram was done which showed EF of 36 to 40%.  Patient was already taking Plavix Entresto and Lopressor at home and no previous 2D echocardiogram for comparison so likely patient has chronic cardiomyopathy.  Noted to have acute urinary retention and urology consultation done and Bajwa placed.  Patient remained abdominal pain-free post Bajwa placement so likely abdominal pain was coming from urinary retention.  Also started on Flomax and Proscar.  Traumatic Bajwa catheterization so noted to have gross hematuria but hemoglobin remained stable.  X-ray KUB was done which did not show any finding of ileus or obstruction.  Normal bowel gas pattern.  Patient did have bowel movement.  Tolerating diet. To have repeat CBC on 11/15 for follow up for Hemoglobin. Since this patient is vitally stable, asymptomatic, it was decided to discharge the patient to nursing home.  Discharge disposition stable.      VITAL SIGNS:  Temp:  [97.7 °F (36.5 °C)-98.6 °F (37 °C)] 97.7 °F (36.5 °C)  Heart Rate:  [60-97] 67  Resp:  [19-20] 20  BP: (117-143)/(73-90) 138/79  SpO2:  [94 %-99 %] 95 %  on  Flow (L/min):  [2] 2;   Device (Oxygen Therapy): room air;nasal cannula    Body mass index is 18.77 kg/m².  Wt Readings from Last 3 Encounters:   11/13/19 54.3 kg (119 lb 9.6 oz)   02/09/18 63.5 kg (140 lb)   11/07/16 58 kg (127 lb 12.8 oz)       PHYSICAL EXAM:  General: Comfortable, Not in distress.  Well-developed and well-nourished.   HENT:  Head:  Normocephalic and atraumatic.  Mouth:  Moist mucous membranes.    Eyes:  Conjunctivae and EOM are normal.  Pupils are equal, round, and reactive to light.  No scleral icterus.    Neck:  Neck supple.  No JVD present.  trachea midline.   Cardiovascular:  Normal rate, regular rhythm with no murmur.  Pulmonary/Chest:  No respiratory distress, no wheezes, no crackles, with coarse breath sounds and good air movement.  Abdomen:  Soft.  Bowel sounds are normal.  No distension and  no tenderness.   Musculoskeletal:  No edema, no tenderness, and no deformity.  No red or swollen joints anywhere.    Neurological:  Alert and nodding appropriately.  No cranial nerve deficit. No focal deficits. No facial droop.  Aphasia +  Skin:  Skin is warm and dry. No rash noted. No pallor.   Peripheral vascular:  pulses in all 4 extremities with no clubbing, no cyanosis, no edema.  Genitourinary: portillo + with hematuria     DISCHARGE DISPOSITION   [facility]    DISCHARGE MEDICATIONS:     Discharge Medications      New Medications      Instructions Start Date   finasteride 5 MG tablet  Commonly known as:  PROSCAR   5 mg, Oral, Daily      tamsulosin 0.4 MG capsule 24 hr capsule  Commonly known as:  FLOMAX   0.4 mg, Oral, Daily         Continue These Medications      Instructions Start Date   aspirin 81 MG EC tablet   81 mg, Oral, Daily      atorvastatin 40 MG tablet  Commonly known as:  LIPITOR   40 mg, Oral, Daily      clopidogrel 75 MG tablet  Commonly known as:  PLAVIX   75 mg, Oral, Daily      hydrOXYzine 25 MG tablet  Commonly known as:  ATARAX   25 mg, Oral, Nightly      metoprolol tartrate 25 MG tablet  Commonly known as:  LOPRESSOR   12.5 mg, Oral, 2 Times Daily      pantoprazole 40 MG EC tablet  Commonly known as:  PROTONIX   40 mg, Oral, Nightly      sacubitril-valsartan 24-26 MG tablet  Commonly known as:  ENTRESTO   1 tablet, Oral, Daily      thiamine 100 MG tablet  Commonly known as:  VITAMIN B-1   100 mg, Oral, Daily             Diet Instructions     Diet: Cardiac      Discharge Diet:  Cardiac        Activity Instructions     Activity as Tolerated          No future appointments.    Additional Instructions for the Follow-ups that You Need to Schedule     Discharge Follow-up with PCP   As directed       Currently Documented PCP:    Marcus Adames MD    PCP Phone Number:    [phone]     Follow Up Details:  Within 1 week         Discharge Follow-up with Specialty: Dr. Francisco; 2 Weeks   As  directed      Specialty:  Dr. Francisco    Follow Up:  2 Weeks         Discharge Follow-up with Specified Provider: Surgery Dr. Clark; 2 Weeks   As directed      To:  Surgery Dr. Clark    Follow Up:  2 Weeks         CBC & Differential    Nov 15, 2019 (Approximate)      Fax report to Dr. Adames    Order Comments:  Fax report to Dr. Adames     Manual Differential:  No           Follow-up Information     Marcus Adames MD .    Specialty:  Internal Medicine  Why:  Within 1 week  Contact information:  [address] [place] [address] [phone]                    TEST  RESULTS PENDING AT DISCHARGE   Order Current Status    Blood Culture - Blood, Arm, Left Preliminary result    Blood Culture - Blood, Arm, Right Preliminary result           CODE STATUS  Code Status and Medical Interventions:   Ordered at: 11/11/19 0026     Limited Support to NOT Include:    Intubation    Cardioversion/Defibrillation    Dialysis     Code Status:    No CPR     Medical Interventions (Level of Support Prior to Arrest):    Limited       Nabila Lassiter MD  11/13/19  11:43 AM    Please note that this discharge summary required more than 30 minutes to complete.    Please send a copy of this dictation to the following providers:  Marcus Adames MD

## 2019-11-14 ENCOUNTER — PATIENT OUTREACH (OUTPATIENT)
Dept: CASE MANAGEMENT | Facility: OTHER | Age: 84
End: 2019-11-14

## 2019-11-14 NOTE — OUTREACH NOTE
SNF Follow-up Note      Responses   Acute Facility Discharged From  [facility]   Acute Discharge Date  11/13/19   Name of the Skilled Nursing Facility?  [facility]   Tier Level of the Skilled Nursing Facility  2   Purpose of SNF Admission  PT, OT, LTC   Estimated length of stay for the patient?  LTC   Who is the insurance provider or payor of patient stay?  Medicare   Progression of Patient?  Spoke with Lien, patient readmitted to their facility yesterday under Medicare after a stay in acute care          Gina Live RN  Community Care Coordinator    11/14/2019, 1:36 PM

## 2019-11-15 LAB
BACTERIA SPEC AEROBE CULT: NORMAL
BACTERIA SPEC AEROBE CULT: NORMAL

## 2019-11-21 ENCOUNTER — PATIENT OUTREACH (OUTPATIENT)
Dept: CASE MANAGEMENT | Facility: OTHER | Age: 84
End: 2019-11-21

## 2019-11-21 NOTE — OUTREACH NOTE
SNF Follow-up Note      Responses   Acute Facility Discharged From  [place]   Acute Discharge Date  11/13/19   Name of the Skilled Nursing Facility?  [facility]   Tier Level of the Skilled Nursing Facility  2   Purpose of SNF Admission  PT, OT, LTC   Estimated length of stay for the patient?  LTC   Who is the insurance provider or payor of patient stay?  Medicare   Progression of Patient?  Spoke with Lien, patient is still skilled under Medicare          Gina Live RN  Ambulatory     11/21/2019, 12:55 PM

## 2019-12-05 ENCOUNTER — PATIENT OUTREACH (OUTPATIENT)
Dept: CASE MANAGEMENT | Facility: OTHER | Age: 84
End: 2019-12-05

## 2019-12-05 NOTE — OUTREACH NOTE
SNF Follow-up Note      Responses   Acute Facility Discharged From  [place]   Acute Discharge Date  11/13/19   Name of the Skilled Nursing Facility?  [facility]   Tier Level of the Skilled Nursing Facility  2   Purpose of SNF Admission  PT, OT, LTC   Estimated length of stay for the patient?  LTC   Who is the insurance provider or payor of patient stay?  Medicare   Progression of Patient?  Spoke with Lien, patient is still skilled under Medicare          Gina Live RN  Ambulatory     12/5/2019, 1:26 PM

## 2019-12-10 ENCOUNTER — EPISODE CHANGES (OUTPATIENT)
Dept: CASE MANAGEMENT | Facility: OTHER | Age: 84
End: 2019-12-10

## 2019-12-13 ENCOUNTER — TRANSCRIBE ORDERS (OUTPATIENT)
Dept: ADMINISTRATIVE | Facility: HOSPITAL | Age: 84
End: 2019-12-13

## 2019-12-13 DIAGNOSIS — R31.9 HEMATURIA, UNSPECIFIED TYPE: Primary | ICD-10-CM

## 2019-12-16 ENCOUNTER — TRANSCRIBE ORDERS (OUTPATIENT)
Dept: ADMINISTRATIVE | Facility: HOSPITAL | Age: 84
End: 2019-12-16

## 2019-12-16 DIAGNOSIS — R13.10 DYSPHAGIA, UNSPECIFIED TYPE: Primary | ICD-10-CM

## 2019-12-17 ENCOUNTER — HOSPITAL ENCOUNTER (OUTPATIENT)
Dept: GENERAL RADIOLOGY | Facility: HOSPITAL | Age: 84
Discharge: HOME | End: 2019-12-17

## 2019-12-17 ENCOUNTER — HOSPITAL ENCOUNTER (OUTPATIENT)
Dept: CT IMAGING | Facility: HOSPITAL | Age: 84
Discharge: HOME | End: 2019-12-17
Admitting: INTERNAL MEDICINE

## 2019-12-17 DIAGNOSIS — R31.9 HEMATURIA, UNSPECIFIED TYPE: ICD-10-CM

## 2019-12-17 DIAGNOSIS — R13.10 DYSPHAGIA, UNSPECIFIED TYPE: ICD-10-CM

## 2019-12-17 LAB — CREAT BLDA-MCNC: 1.3 MG/DL (ref 0.6–1.3)

## 2019-12-17 PROCEDURE — 82565 ASSAY OF CREATININE: CPT

## 2019-12-17 PROCEDURE — 74230 X-RAY XM SWLNG FUNCJ C+: CPT | Performed by: RADIOLOGY

## 2019-12-17 PROCEDURE — 74177 CT ABD & PELVIS W/CONTRAST: CPT | Performed by: RADIOLOGY

## 2019-12-17 PROCEDURE — 74230 X-RAY XM SWLNG FUNCJ C+: CPT

## 2019-12-17 PROCEDURE — 0 IOVERSOL 68 % SOLUTION: Performed by: INTERNAL MEDICINE

## 2019-12-17 PROCEDURE — 74177 CT ABD & PELVIS W/CONTRAST: CPT

## 2019-12-17 PROCEDURE — 92611 MOTION FLUOROSCOPY/SWALLOW: CPT

## 2019-12-17 RX ADMIN — IOVERSOL 100 ML: 678 INJECTION INTRA-ARTERIAL; INTRAVENOUS at 09:16

## 2019-12-17 NOTE — MBS/VFSS/FEES
"SNF - Speech Language Pathology   Swallow Initial Evaluation [facility]   OUTPATIENT MODIFIED BARIUM SWALLOW STUDY     Patient Name: Demarcus Pastor  : 1933  MRN: [id number]  Today's Date: 2019             Admit Date: 2019     Demarcus Pastor  presents to the radiology suite this am from [facility]  to participate in an instrumental MBS to evaluate safety/efficacy of swallowing fnx, determine safest/least restrictive diet. He is accompanied by a staff member of [facility].     EMR review indicates that Mr. Pastor was recently admitted to [facility] 11/10/19-19 2/2 abdominal pain. 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 He is referred today to r/o aspiration.      Chest xray is not available for review.     He is observed on ra w/o complications.     Risks and benefits of the procedure are explained w/ verbalizing understanding/agreement to participate. Proceed per protocol.     Mr. Pastor is positioned upright and centered in a wheel chair to accept multiple po presentations of solid cracker, puree, honey thick, nectar thick, and thin liquids via spoon, cup and straw, along w/ whole placebo pill in puree. He requires assistance w/ po intake.      All views are from the lateral plane.     Facial/oral structures are symmetrical upon observation w/o lingual deviation upon protrusion. Oral mucosa are moist, pink and clean. Secretions are clear, thin and controlled. OROM/LOUIE is mild-moderately weak in general to imitate oral postures. Gag is not assessed. Volitional cough CNA as could not elicit. Vocal quality is  adequate in intensity, clear in quality w/ intelligible speech, per very " limited observations w/ primary single word responses. He is a/a and cooperative to participate, oriented to himself, follows simple directives, and participates in simple conversational exchanges w/ appropriate y/n responses only.      Upon po presentations, adequate bolus anticipation w/ good labial seal for bolus clearance via spoon bowl, cup rim stability and suction via straw.  Bolus formation, manipulation,  and control are moderately weak limited attempts to masticate solid cracker, rather rolling in oral cavity, partially removed per SLP. Lingual pumping noted w/ all consistencies w/ delayed tongue base retraction and weak linguavelar seal, resulting in premature loss of all consistencies to the vallecula, thin liquids via all presentation styles to the bilateral pyriform sinuses. Mild tongue base residue w/puree and solids, cleared w/ spontaneous consecutive swallow. No laryngeal penetration or aspiration is evidenced before the swallow.     Pharyngeal swallow is moderately delayed w/ weak hyolaryngeal elevation, delayed epiglottic inversion. Pharyngeal contraction is weak w/ vallecular residue w/all consistencies, controlled. Deep laryngeal penetration, leading to silent aspiration of thin liquids via all presentation styles, occurring during the swallow. Cued cough could not be elicted, therefore aspirated material is not diminished or cleared. Compensatory techniques deferred as could not cue to elicit consistency. No laryngeal penetration or aspiration evidenced during or after the swallow.     Partial esophageal sweep reveals no mucosal abnormalities. Motility is wfl w/o retrograde flow noted.      Visit Dx:     ICD-10-CM ICD-9-CM   1. Dysphagia, unspecified type R13.10 787.20     Patient Active Problem List   Diagnosis   • Esophageal stricture   • Hypertension   • Chronic back pain   • Weight loss   • Dementia (CMS/HCC)   • Elevated LFTs   • Dysphagia   • Colitis   • Abnormal CT of the abdomen   •  Malnutrition (CMS/HCC)   • Iron deficiency anemia   • Anemia due to vitamin B12 deficiency   • Hypokalemia   • Late onset 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 (CMS/HCC)   • Anemia     Past Medical History:   Diagnosis Date   • Abnormal CT of the abdomen 6/19/2016   • Colitis 6/19/2016   • CVA (cerebral vascular accident) (CMS/HCC)    • History of transfusion    • Hyperlipidemia    • Hypertension    • Pancreatitis      Past Surgical History:   Procedure Laterality Date   • COLONOSCOPY N/A 11/9/2016    Procedure: COLONOSCOPY CPT CODE: 36136;  Surgeon: Lilliam Wilson DO;  Location: [facility] OR;  Service:    • ENDOSCOPY N/A 6/17/2016    Procedure: ESOPHAGOGASTRODUODENOSCOPY;  Surgeon: Alex Robles III, MD;  Location: [facility] OR;  Service:    • ENDOSCOPY N/A 11/9/2016    Procedure: ESOPHAGOGASTRODUODENOSCOPY WITH BIOPSY CPT CODE: 66858;  Surgeon: Lilliam Wilson DO;  Location: [facility] OR;  Service:    • ESOPHAGEAL DILATATION       EDUCATION  The patient has been educated in the following areas:   Dysphagia (Swallowing Impairment) Oral Care/Hydration Modified Diet Instruction.    Impression: Mr. Pastor presents w/ a moderate oral dysphagia, mild-moderate pharyngeal dysphagia w/ silent aspiration of thin liquids only, occurring during the swallow w/ all presentation styles. No other laryngeal penetration or aspiration evidenced across this evaluation.     He is felt to most benefit from modified po diet of puree, NECTAR thick liquids only. Medications crushed in puree. 1:1 assistance w/ po intake.       SLP Recommendation and Plan    1. Puree diet, NECTAR thick liquids only. No ice-cream, jell-o, milkshakes.   2. 1:1 feedings.   3. Medications crushed in puree.   4. Universal aspiration precautions.   4. BRISA precautions.      D/w pt results and recommendations w/ verbal understanding and agreement, however, question retention or full understanding of this conversation per premorbid baseline ams.      D/w present staff member of [facility] w/ verbal understanding.     Thank you for allowing me to participate in the care of your patient-  Addie Damon M.S., CCC/SLP                                                        Time Calculation:       Therapy Charges for Today     Code Description Service Date Service Provider Modifiers Qty    [id number] HC ST MOTION FLUORO EVAL SWALLOW 8 12/17/2019 Addie Damon, MS CCC-SLP GN 1               Addie Damon, MS CCC-SLP  12/17/2019

## 2019-12-19 ENCOUNTER — APPOINTMENT (OUTPATIENT)
Dept: CT IMAGING | Facility: HOSPITAL | Age: 84
End: 2019-12-19

## 2019-12-19 ENCOUNTER — APPOINTMENT (OUTPATIENT)
Dept: GENERAL RADIOLOGY | Facility: HOSPITAL | Age: 84
End: 2019-12-19

## 2019-12-19 ENCOUNTER — HOSPITAL ENCOUNTER (EMERGENCY)
Facility: HOSPITAL | Age: 84
Discharge: NURSING, REHAB OR LONG-TERM CARE | End: 2019-12-19
Attending: EMERGENCY MEDICINE | Admitting: EMERGENCY MEDICINE

## 2019-12-19 VITALS
OXYGEN SATURATION: 98 % | RESPIRATION RATE: 18 BRPM | DIASTOLIC BLOOD PRESSURE: 61 MMHG | BODY MASS INDEX: 15.71 KG/M2 | SYSTOLIC BLOOD PRESSURE: 106 MMHG | HEIGHT: 72 IN | WEIGHT: 116 LBS | HEART RATE: 58 BPM | TEMPERATURE: 97.9 F

## 2019-12-19 DIAGNOSIS — S00.01XA ABRASION OF SCALP, INITIAL ENCOUNTER: ICD-10-CM
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
## 2019-12-19 PROCEDURE — 70450 CT HEAD/BRAIN W/O DYE: CPT | Performed by: RADIOLOGY

## 2019-12-19 PROCEDURE — 72170 X-RAY EXAM OF PELVIS: CPT

## 2019-12-19 PROCEDURE — 72170 X-RAY EXAM OF PELVIS: CPT | Performed by: RADIOLOGY

## 2019-12-19 PROCEDURE — 71045 X-RAY EXAM CHEST 1 VIEW: CPT | Performed by: RADIOLOGY

## 2019-12-19 PROCEDURE — 70450 CT HEAD/BRAIN W/O DYE: CPT

## 2019-12-19 PROCEDURE — 71045 X-RAY EXAM CHEST 1 VIEW: CPT

## 2019-12-19 PROCEDURE — 99284 EMERGENCY DEPT VISIT MOD MDM: CPT

## 2019-12-19 NOTE — ED PROVIDER NOTES
Subjective   Patient is an 86-year-old male sent from a local nursing home for evaluation.  They report that he fell out of his wheelchair, struck his head and sustained abrasion.  Patient has a history of stroke with expressive aphasia, making it very difficult to get history from him.  Patient denies injury or any sort of pain.  He is unable to provide any details.          Review of Systems   Unable to perform ROS: Other (Expressive aphasia from previous CVA)       Past Medical History:   Diagnosis Date   • Abnormal CT of the abdomen 6/19/2016   • Colitis 6/19/2016   • CVA (cerebral vascular accident) (CMS/HCC)    • History of transfusion    • Hyperlipidemia    • Hypertension    • Pancreatitis        Allergies   Allergen Reactions   • Penicillins        Past Surgical History:   Procedure Laterality Date   • COLONOSCOPY N/A 11/9/2016    Procedure: COLONOSCOPY CPT CODE: 95634;  Surgeon: Lilliam Wilson DO;  Location: [facility] OR;  Service:    • ENDOSCOPY N/A 6/17/2016    Procedure: ESOPHAGOGASTRODUODENOSCOPY;  Surgeon: Alex Robles III, MD;  Location: [facility] OR;  Service:    • ENDOSCOPY N/A 11/9/2016    Procedure: ESOPHAGOGASTRODUODENOSCOPY WITH BIOPSY CPT CODE: 88508;  Surgeon: Lilliam Wilson DO;  Location: [facility] OR;  Service:    • ESOPHAGEAL DILATATION         Family History   Problem Relation Age of Onset   • Stroke Mother    • Heart disease Brother        Social History     Socioeconomic History   • Marital status:      Spouse name: Not on file   • Number of children: Not on file   • Years of education: Not on file   • Highest education level: Not on file   Tobacco Use   • Smoking status: Never Smoker   • Smokeless tobacco: Never Used   Substance and Sexual Activity   • Alcohol use: No   • Drug use: No   • Sexual activity: Defer           Objective   Physical Exam   Constitutional: No distress.   An elderly, frail, chronically ill-appearing white male who is in no apparent distress.  He  appears comfortable.   HENT:   Head: Normocephalic.   There is superficial abrasion to the right upper temporal area.  There is no bony tenderness or deformity.  The head is otherwise normocephalic, nontender, atraumatic.   Eyes: Pupils are equal, round, and reactive to light. No scleral icterus.   Neck: Normal range of motion. Neck supple. No neck rigidity. No tracheal deviation present.   Neck is soft and completely nontender to palpation throughout, full range of painless motion.   Cardiovascular: Normal rate, regular rhythm and intact distal pulses.   Pulmonary/Chest: Effort normal and breath sounds normal. No respiratory distress. He exhibits no tenderness.   Abdominal: Soft. Bowel sounds are normal. There is no tenderness. There is no rebound and no guarding.   Musculoskeletal: Normal range of motion. He exhibits no tenderness.   The back is nontender.  The chest is nontender.  The pelvis is stable nontender.  The extremities are all nontender and atraumatic.   Neurological: He is alert.   Expressive aphasia is present, from previous CVA.   Skin: Skin is warm and dry. He is not diaphoretic. No cyanosis. No pallor.   Psychiatric: He has a normal mood and affect. His behavior is normal.   Nursing note and vitals reviewed.      Procedures  CT Head Without Contrast   Final Result   Global atrophy and changes of encephalomalacia in the left parietal   region but no acute posttraumatic changes in the brain       This report was finalized on 12/19/2019 8:34 AM by Dr. Paul Aranda MD.          XR Pelvis 1 or 2 View   Preliminary Result   No acute bony abnormality.               XR Chest 1 View   Final Result   No evidence of active or acute cardiopulmonary disease on today's chest   radiograph.       This report was finalized on 12/19/2019 10:08 AM by Dr. Paul Aranda MD.                     ED Course  ED Course as of Dec 19 1138   Thu Dec 19, 2019   0957 CT has been resulted.  According to epic the chest and  pelvis films were shot at 823 and 8:24 AM respectively, however there is still no image available, no radiologist interpretation.  I called radiology about this, they are looking into it.  Patient resting comfortably in no apparent distress.    [CM]   1039 Patient's emergency department stay has been uneventful.  Never has he shown any signs of distress.  He is discharged to the nursing home.    [CM]      ED Course User Index  [CM] Diallo Nowak MD                      [facility]    Final diagnoses:   Fall, initial encounter   Abrasion of scalp, initial encounter               Please note that portions of this note were completed with a voice recognition program.        Diallo Nowak MD  12/19/19 8704

## 2019-12-19 NOTE — DISCHARGE INSTRUCTIONS
Abrasion    An abrasion is a cut or a scrape on the surface of your skin. An abrasion does not go through all the layers of your skin. It is important to take good care of your abrasion to prevent infection.  Follow these instructions at home:  Medicines  · Take or apply over-the-counter and prescription medicines only as told by your doctor.  · If you were prescribed an antibiotic medicine, apply it as told by your doctor. Do not stop using the antibiotic even if you start to feel better.  Wound care  · Clean the wound 2-3 times a day or as often as told by your doctor. To do this:  ? Wash the wound with mild soap and water.  ? Rinse off the soap.  ? Pat a clean towel on the wound to dry it. Do not rub it.  · Keep the bandage (dressing) clean and dry as told by your doctor.  · Follow instructions from your doctor about how to take care of your wound. Make sure you:  ? Wash your hands with soap and water before you change your bandage. If you cannot use soap and water, use hand .  ? Change your bandage as told by your doctor.  · Check your wound every day for signs of infection. Check for:  ? Redness, swelling, or pain.  ? Fluid or blood.  ? Warmth.  ? Pus or a bad smell.  · If directed, put ice on the injured area. To do this:  ? Put ice in a plastic bag.  ? Place a towel between your skin and the bag.  ? Leave the ice on for 20 minutes, 2-3 times a day.  General instructions  · Do not take baths, swim, or use a hot tub until your doctor says it is okay.  · If there is swelling, raise (elevate) the injured area above the level of your heart while you are sitting or lying down.  · Keep all follow-up visits as told by your doctor. This is important.  Contact a doctor if:  · You were given a tetanus shot, and you have any of these where the needle went in:  ? Swelling.  ? Very bad pain.  ? Redness.  ? Bleeding.  · You have a lot of pain, and medicine does not help.  · You have any of these at the site of the  wound:  ? More redness.  ? More swelling.  ? More pain.  Get help right away if:  · You have a red streak going away from your wound.  · You have a fever.  · You have fluid, blood, or pus coming from your wound.  · There is a bad smell coming from your wound or bandage.  Summary  · An abrasion is a cut or a scrape on the surface of your skin.  · Take good care of your abrasion so it does not get infected.  · Clean the wound with mild soap and water, and change your bandage as told by your doctor.  · Call your doctor if you have redness, swelling, or more pain in your wound.  · Get help right away if you have a fever or if you have fluid, blood, pus, a bad smell, or a red streak coming from the wound.  This information is not intended to replace advice given to you by your health care provider. Make sure you discuss any questions you have with your health care provider.  Document Released: 06/05/2009 Document Revised: 08/09/2018 Document Reviewed: 08/09/2018  [employer] Interactive Patient Education © 2019 [employer]

## 2019-12-19 NOTE — ED NOTES
Pt alert and oriented, skin pwd, no resp distress, poc updated, pt waiting on imaging to result, fall precautions maintained.     Jeniffer Carrion, RN  12/19/19 5211

## 2019-12-19 NOTE — ED NOTES
Called [facility] for transport back to [facility].      Vipul Foster  12/19/19 0965

## 2019-12-20 ENCOUNTER — PATIENT OUTREACH (OUTPATIENT)
Dept: CASE MANAGEMENT | Facility: OTHER | Age: 84
End: 2019-12-20

## 2019-12-20 NOTE — OUTREACH NOTE
Care Coordination Note    Patient Ping alerted to patient's ED visit at [facility]; patient is a resident of SNF, was treated in ED for fall with abrasion and transferred back to [facility].    Gina Live RN  Ambulatory     12/20/2019, 12:18 PM

## 2020-01-02 ENCOUNTER — PATIENT OUTREACH (OUTPATIENT)
Dept: CASE MANAGEMENT | Facility: OTHER | Age: 85
End: 2020-01-02

## 2020-01-02 NOTE — OUTREACH NOTE
Care Coordination Note    RNАЛЕКСАНДР spoke with Lien at [facility] to verify payor status; per Lien, patient is still receiving skilled services under Medicare A. No discharge plans at present.     Gina Live RN  Ambulatory     1/2/2020, 2:06 PM

## 2020-01-09 ENCOUNTER — PATIENT OUTREACH (OUTPATIENT)
Dept: CASE MANAGEMENT | Facility: OTHER | Age: 85
End: 2020-01-09

## 2020-01-09 NOTE — OUTREACH NOTE
Care Coordination Note    RN-TRUE spoke with Carolann at [facility] to confirm patient's payor status; patient is covered under Medicare A for skilled services; no dc date has been set at this time.    Gina Live RN  Ambulatory     1/9/2020, 12:18 PM

## 2020-01-16 ENCOUNTER — PATIENT OUTREACH (OUTPATIENT)
Dept: CASE MANAGEMENT | Facility: OTHER | Age: 85
End: 2020-01-16

## 2020-01-16 NOTE — OUTREACH NOTE
Care Coordination Note    RN-TRUE spoke with Lien at [facility] to confirm patient's payor status; patient continues to be covered under Medicare A for skilled services.       Gina Live RN  Ambulatory     1/16/2020, 10:29 AM

## 2020-01-23 ENCOUNTER — PATIENT OUTREACH (OUTPATIENT)
Dept: CASE MANAGEMENT | Facility: OTHER | Age: 85
End: 2020-01-23

## 2020-01-23 NOTE — OUTREACH NOTE
Care Coordination Note    RN-ACM spoke with Lien at [facility] to check patient's payor status/plan; per Lien, patient is still covered under Med A for skilled services; no dc date set at this time.    Gina Live RN  Ambulatory     1/23/2020, 9:55 AM

## 2020-01-30 ENCOUNTER — PATIENT OUTREACH (OUTPATIENT)
Dept: CASE MANAGEMENT | Facility: OTHER | Age: 85
End: 2020-01-30

## 2020-08-07 ENCOUNTER — HOSPITAL ENCOUNTER (OUTPATIENT)
Dept: GENERAL RADIOLOGY | Facility: HOSPITAL | Age: 85
Discharge: HOME | End: 2020-08-07
Admitting: ORTHOPAEDIC SURGERY

## 2020-08-07 ENCOUNTER — OFFICE VISIT (OUTPATIENT)
Dept: ORTHOPEDIC SURGERY | Facility: CLINIC | Age: 85
End: 2020-08-07

## 2020-08-07 VITALS
HEART RATE: 43 BPM | TEMPERATURE: 97.8 F | SYSTOLIC BLOOD PRESSURE: 121 MMHG | DIASTOLIC BLOOD PRESSURE: 71 MMHG | WEIGHT: 114 LBS | BODY MASS INDEX: 15.46 KG/M2

## 2020-08-07 DIAGNOSIS — M79.641 RIGHT HAND PAIN: Primary | ICD-10-CM

## 2020-08-07 DIAGNOSIS — S60.419A ABRASION OF FINGER OF RIGHT HAND, INITIAL ENCOUNTER: Primary | ICD-10-CM

## 2020-08-07 PROCEDURE — 99203 OFFICE O/P NEW LOW 30 MIN: CPT | Performed by: ORTHOPAEDIC SURGERY

## 2020-08-07 PROCEDURE — 73130 X-RAY EXAM OF HAND: CPT

## 2020-08-07 PROCEDURE — 73130 X-RAY EXAM OF HAND: CPT | Performed by: RADIOLOGY

## 2020-08-07 RX ORDER — ACETAMINOPHEN 500 MG
500 TABLET ORAL EVERY 6 HOURS PRN
COMMUNITY
End: 2020-09-11

## 2020-08-07 RX ORDER — CARVEDILOL 6.25 MG/1
6.25 TABLET ORAL 2 TIMES DAILY WITH MEALS
Status: ON HOLD | COMMUNITY
End: 2020-09-16 | Stop reason: SDUPTHER

## 2020-08-07 RX ORDER — FINASTERIDE 5 MG/1
5 TABLET, FILM COATED ORAL DAILY
COMMUNITY

## 2020-08-07 RX ORDER — SPIRONOLACTONE 25 MG/1
12.5 TABLET ORAL DAILY
COMMUNITY

## 2020-08-07 RX ORDER — LOPERAMIDE HYDROCHLORIDE 2 MG/1
2 CAPSULE ORAL 4 TIMES DAILY PRN
COMMUNITY
End: 2020-09-11

## 2020-08-07 RX ORDER — ONDANSETRON 4 MG/1
4 TABLET, FILM COATED ORAL EVERY 8 HOURS PRN
COMMUNITY
End: 2020-09-11

## 2020-08-07 RX ORDER — TAMSULOSIN HYDROCHLORIDE 0.4 MG/1
1 CAPSULE ORAL DAILY
COMMUNITY

## 2020-08-07 NOTE — PROGRESS NOTES
New Patient Visit      Patient: Demarcus Pastor  YOB: 1933  Date of Encounter: 08/07/2020        Chief Complaint:   Chief Complaint   Patient presents with   • Right Hand - Initial Evaluation, Pain           HPI:   Demarcus Pastor, 87 y.o. male, referred by Umm Butt MD presents evaluation of right hand.  He has no one with him today and he is nonverbal.  He has bandage over the dorsal aspect of his right hand.  Does present with x-ray report dated  The 31st of 2020 which describes questionable minimally displaced fracture of the third metacarpal head.  Medical history is remarkable for stroke.        Active Problem List:  Patient Active Problem List   Diagnosis   • Esophageal stricture   • Hypertension   • Chronic back pain   • Weight loss   • Dementia (CMS/HCC)   • Elevated LFTs   • Dysphagia   • Colitis   • Abnormal CT of the abdomen   • Malnutrition (CMS/HCC)   • Iron deficiency anemia   • Anemia due to vitamin B12 deficiency   • Hypokalemia   • Late onset 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 (CMS/HCC)   • Anemia           Past Medical History:  Past Medical History:   Diagnosis Date   • Abnormal CT of the abdomen 6/19/2016   • Alzheimer's disease with late onset (CMS/HCC)    • Anxiety    • Atelectasis    • Atherosclerotic heart disease    • Cardiomyopathy (CMS/HCC)    • Colitis 6/19/2016   • CVA (cerebral vascular accident) (CMS/HCC)    • Diverticulosis    • Dysphagia    • GERD (gastroesophageal reflux disease)    • Hemiplegia (CMS/HCC)    • History of transfusion    • Hyperlipidemia    • Hypertension    • Hypertension    • Hypokalemia    • Inguinal hernia    • Intracardiac thrombosis, not elsewhere classified    • Iron deficiency anemia    • Kidney stone    • Malnutrition (CMS/HCC)    • Muscle weakness    • Neuromuscular dysfunction of bladder    • Pancreatitis            Past Surgical History:  Past Surgical History:   Procedure Laterality Date   • COLONOSCOPY N/A 11/9/2016     Procedure: COLONOSCOPY CPT CODE: 05172;  Surgeon: Lilliam Wilson DO;  Location:  [facility] OR;  Service:    • ENDOSCOPY N/A 6/17/2016    Procedure: ESOPHAGOGASTRODUODENOSCOPY;  Surgeon: Alex Robles III, MD;  Location:  [facility] OR;  Service:    • ENDOSCOPY N/A 11/9/2016    Procedure: ESOPHAGOGASTRODUODENOSCOPY WITH BIOPSY CPT CODE: 99465;  Surgeon: Lilliam Wilson DO;  Location:  [facility] OR;  Service:    • ESOPHAGEAL DILATATION             Family History:  Family History   Problem Relation Age of Onset   • Stroke Mother    • Heart disease Brother            Social History:  Social History     Socioeconomic History   • Marital status:      Spouse name: Not on file   • Number of children: Not on file   • Years of education: Not on file   • Highest education level: Not on file   Tobacco Use   • Smoking status: Never Smoker   • Smokeless tobacco: Never Used   Substance and Sexual Activity   • Alcohol use: No   • Drug use: No   • Sexual activity: Defer     Body mass index is 15.46 kg/m².      Medications:  Current Outpatient Medications   Medication Sig Dispense Refill   • acetaminophen (TYLENOL) 500 MG tablet Take 500 mg by mouth Every 6 (Six) Hours As Needed for Mild Pain .     • aspirin 81 MG EC tablet Take 81 mg by mouth Daily.     • atorvastatin (LIPITOR) 40 MG tablet Take 40 mg by mouth Daily.     • carvedilol (COREG) 6.25 MG tablet Take 6.25 mg by mouth 2 (Two) Times a Day With Meals.     • clopidogrel (PLAVIX) 75 MG tablet Take 75 mg by mouth Daily.     • finasteride (PROSCAR) 5 MG tablet Take 5 mg by mouth Daily.     • hydrOXYzine (ATARAX) 25 MG tablet Take 25 mg by mouth Every Night.     • loperamide (IMODIUM) 2 MG capsule Take 2 mg by mouth 4 (Four) Times a Day As Needed for Diarrhea.     • ondansetron (ZOFRAN) 4 MG tablet Take 4 mg by mouth Every 8 (Eight) Hours As Needed for Nausea or Vomiting.     • pantoprazole (PROTONIX) 40 MG EC tablet Take 40 mg by mouth Every Night.     •  sacubitril-valsartan (ENTRESTO) 24-26 MG tablet Take 1 tablet by mouth Daily.     • spironolactone (ALDACTONE) 25 MG tablet Take 25 mg by mouth Daily.     • tamsulosin (FLOMAX) 0.4 MG capsule 24 hr capsule Take 1 capsule by mouth Daily.       No current facility-administered medications for this visit.            Allergies:  Allergies   Allergen Reactions   • Penicillins Unknown - Low Severity           Review of Systems:   Review of Systems   Unable to perform ROS: Patient nonverbal           Physical Exam:   Physical Exam  GENERAL: 87 y.o. male, alert and oriented X 3 in no acute distress.   Visit Vitals  /71   Pulse (!) 43   Temp 97.8 °F (36.6 °C)   Wt 51.7 kg (114 lb)   BMI 15.46 kg/m²         Musculoskeletal:   Examination right hand reveals abrasion over the dorsal aspect in line with the third and fourth metacarpal bases.  There is no surrounding erythema.  Is no full-thickness skin loss.  No active drainage.  He is able to demonstrate fisted position and fully extend his fingers upon palpation he has mild tenderness along the third and fourth metacarpal heads.        Radiology/Labs:     Xr Hand 3+ View Right    Result Date: 8/7/2020  No acute or destructive bony abnormality.  COMMUNICATION: Per this written report.  This report was finalized on 8/7/2020 1:06 PM by Dr. Anthony Pastor MD.        I reviewed radiographs of his right hand and there is moderate degenerative changes within the third MCP joint with complete loss of joint space.  I see no acute fracture.    Assessment & Plan:   87 y.o. male presents with abrasion over the dorsal aspect of his right hand with x-ray report describing questionable nondisplaced fracture involving the third metacarpal head.  With minimal findings clinically I do not think this is likely.  We will treat his abrasion with Adaptic 4 x 4's and loosely applied Ace bandage.  Do not anticipate problems with wound healing.  He will follow-up in his office as needed.         ICD-10-CM ICD-9-CM   1. Abrasion of finger of right hand, initial encounter S60.419A 915.0           Cc:   Umm Butt MD                This document has been electronically signed by Jeff Park MD   August 7, 2020 22:51

## 2020-09-11 ENCOUNTER — APPOINTMENT (OUTPATIENT)
Dept: GENERAL RADIOLOGY | Facility: HOSPITAL | Age: 85
End: 2020-09-11

## 2020-09-11 ENCOUNTER — HOSPITAL ENCOUNTER (INPATIENT)
Facility: HOSPITAL | Age: 85
LOS: 5 days | Discharge: NURSING, REHAB OR LONG-TERM CARE | End: 2020-09-16
Attending: EMERGENCY MEDICINE | Admitting: FAMILY MEDICINE

## 2020-09-11 DIAGNOSIS — Z86.73 HISTORY OF CVA (CEREBROVASCULAR ACCIDENT): ICD-10-CM

## 2020-09-11 DIAGNOSIS — S72.001A CLOSED FRACTURE OF NECK OF RIGHT FEMUR, INITIAL ENCOUNTER (HCC): Primary | ICD-10-CM

## 2020-09-11 LAB
ABO GROUP BLD: NORMAL
ALBUMIN SERPL-MCNC: 3.76 G/DL (ref 3.5–5.2)
ALBUMIN/GLOB SERPL: 1.1 G/DL
ALP SERPL-CCNC: 43 U/L (ref 39–117)
ALT SERPL W P-5'-P-CCNC: 16 U/L (ref 1–41)
ANION GAP SERPL CALCULATED.3IONS-SCNC: 8.3 MMOL/L (ref 5–15)
AST SERPL-CCNC: 27 U/L (ref 1–40)
BASOPHILS # BLD AUTO: 0.06 10*3/MM3 (ref 0–0.2)
BASOPHILS NFR BLD AUTO: 0.5 % (ref 0–1.5)
BILIRUB SERPL-MCNC: 1.3 MG/DL (ref 0–1.2)
BLD GP AB SCN SERPL QL: NEGATIVE
BUN SERPL-MCNC: 20 MG/DL (ref 8–23)
BUN/CREAT SERPL: 12.8 (ref 7–25)
CALCIUM SPEC-SCNC: 8.7 MG/DL (ref 8.6–10.5)
CHLORIDE SERPL-SCNC: 103 MMOL/L (ref 98–107)
CK SERPL-CCNC: 799 U/L (ref 20–200)
CO2 SERPL-SCNC: 24.7 MMOL/L (ref 22–29)
CREAT SERPL-MCNC: 1.56 MG/DL (ref 0.76–1.27)
DEPRECATED RDW RBC AUTO: 53.5 FL (ref 37–54)
EOSINOPHIL # BLD AUTO: 0.22 10*3/MM3 (ref 0–0.4)
EOSINOPHIL NFR BLD AUTO: 1.9 % (ref 0.3–6.2)
ERYTHROCYTE [DISTWIDTH] IN BLOOD BY AUTOMATED COUNT: 15.6 % (ref 12.3–15.4)
GFR SERPL CREATININE-BSD FRML MDRD: 42 ML/MIN/1.73
GLOBULIN UR ELPH-MCNC: 3.3 GM/DL
GLUCOSE SERPL-MCNC: 109 MG/DL (ref 65–99)
HCT VFR BLD AUTO: 45 % (ref 37.5–51)
HGB BLD-MCNC: 14.7 G/DL (ref 13–17.7)
IMM GRANULOCYTES # BLD AUTO: 0.09 10*3/MM3 (ref 0–0.05)
IMM GRANULOCYTES NFR BLD AUTO: 0.8 % (ref 0–0.5)
LYMPHOCYTES # BLD AUTO: 2.24 10*3/MM3 (ref 0.7–3.1)
LYMPHOCYTES NFR BLD AUTO: 19.4 % (ref 19.6–45.3)
MCH RBC QN AUTO: 30.4 PG (ref 26.6–33)
MCHC RBC AUTO-ENTMCNC: 32.7 G/DL (ref 31.5–35.7)
MCV RBC AUTO: 93 FL (ref 79–97)
MONOCYTES # BLD AUTO: 1.31 10*3/MM3 (ref 0.1–0.9)
MONOCYTES NFR BLD AUTO: 11.3 % (ref 5–12)
NEUTROPHILS NFR BLD AUTO: 66.1 % (ref 42.7–76)
NEUTROPHILS NFR BLD AUTO: 7.63 10*3/MM3 (ref 1.7–7)
NRBC BLD AUTO-RTO: 0.3 /100 WBC (ref 0–0.2)
PLATELET # BLD AUTO: 214 10*3/MM3 (ref 140–450)
PMV BLD AUTO: 10.9 FL (ref 6–12)
POTASSIUM SERPL-SCNC: 4.2 MMOL/L (ref 3.5–5.2)
PROT SERPL-MCNC: 7.1 G/DL (ref 6–8.5)
RBC # BLD AUTO: 4.84 10*6/MM3 (ref 4.14–5.8)
RH BLD: POSITIVE
SARS-COV-2 RDRP RESP QL NAA+PROBE: NOT DETECTED
SODIUM SERPL-SCNC: 136 MMOL/L (ref 136–145)
T&S EXPIRATION DATE: NORMAL
WBC # BLD AUTO: 11.55 10*3/MM3 (ref 3.4–10.8)

## 2020-09-11 PROCEDURE — 86850 RBC ANTIBODY SCREEN: CPT | Performed by: NURSE PRACTITIONER

## 2020-09-11 PROCEDURE — 99285 EMERGENCY DEPT VISIT HI MDM: CPT

## 2020-09-11 PROCEDURE — 80053 COMPREHEN METABOLIC PANEL: CPT | Performed by: EMERGENCY MEDICINE

## 2020-09-11 PROCEDURE — 82550 ASSAY OF CK (CPK): CPT | Performed by: EMERGENCY MEDICINE

## 2020-09-11 PROCEDURE — G0008 ADMIN INFLUENZA VIRUS VAC: HCPCS | Performed by: FAMILY MEDICINE

## 2020-09-11 PROCEDURE — 25010000002 MORPHINE PER 10 MG: Performed by: EMERGENCY MEDICINE

## 2020-09-11 PROCEDURE — 73552 X-RAY EXAM OF FEMUR 2/>: CPT

## 2020-09-11 PROCEDURE — 36415 COLL VENOUS BLD VENIPUNCTURE: CPT

## 2020-09-11 PROCEDURE — 87635 SARS-COV-2 COVID-19 AMP PRB: CPT | Performed by: NURSE PRACTITIONER

## 2020-09-11 PROCEDURE — 86900 BLOOD TYPING SEROLOGIC ABO: CPT | Performed by: NURSE PRACTITIONER

## 2020-09-11 PROCEDURE — 90686 IIV4 VACC NO PRSV 0.5 ML IM: CPT | Performed by: FAMILY MEDICINE

## 2020-09-11 PROCEDURE — 85025 COMPLETE CBC W/AUTO DIFF WBC: CPT | Performed by: EMERGENCY MEDICINE

## 2020-09-11 PROCEDURE — 25010000002 ONDANSETRON PER 1 MG: Performed by: EMERGENCY MEDICINE

## 2020-09-11 PROCEDURE — 86901 BLOOD TYPING SEROLOGIC RH(D): CPT | Performed by: NURSE PRACTITIONER

## 2020-09-11 PROCEDURE — 25010000002 INFLUENZA VAC SPLIT QUAD 0.5 ML SUSPENSION PREFILLED SYRINGE: Performed by: FAMILY MEDICINE

## 2020-09-11 PROCEDURE — 73502 X-RAY EXAM HIP UNI 2-3 VIEWS: CPT

## 2020-09-11 PROCEDURE — 99223 1ST HOSP IP/OBS HIGH 75: CPT | Performed by: FAMILY MEDICINE

## 2020-09-11 RX ORDER — FINASTERIDE 5 MG/1
5 TABLET, FILM COATED ORAL DAILY
Status: DISCONTINUED | OUTPATIENT
Start: 2020-09-11 | End: 2020-09-16 | Stop reason: HOSPADM

## 2020-09-11 RX ORDER — ACETAMINOPHEN 500 MG
500 TABLET ORAL EVERY 4 HOURS PRN
COMMUNITY

## 2020-09-11 RX ORDER — THIAMINE MONONITRATE (VIT B1) 100 MG
100 TABLET ORAL DAILY
COMMUNITY

## 2020-09-11 RX ORDER — MORPHINE SULFATE 2 MG/ML
1 INJECTION, SOLUTION INTRAMUSCULAR; INTRAVENOUS
Status: DISCONTINUED | OUTPATIENT
Start: 2020-09-11 | End: 2020-09-12

## 2020-09-11 RX ORDER — TAMSULOSIN HYDROCHLORIDE 0.4 MG/1
0.4 CAPSULE ORAL DAILY
Status: DISCONTINUED | OUTPATIENT
Start: 2020-09-11 | End: 2020-09-16 | Stop reason: HOSPADM

## 2020-09-11 RX ORDER — CYANOCOBALAMIN 1000 UG/ML
1000 INJECTION, SOLUTION INTRAMUSCULAR; SUBCUTANEOUS
COMMUNITY

## 2020-09-11 RX ORDER — LOPERAMIDE HYDROCHLORIDE 2 MG/1
2 CAPSULE ORAL 4 TIMES DAILY PRN
Status: DISCONTINUED | OUTPATIENT
Start: 2020-09-11 | End: 2020-09-14

## 2020-09-11 RX ORDER — ACETAMINOPHEN 500 MG
500 TABLET ORAL EVERY 6 HOURS PRN
Status: DISCONTINUED | OUTPATIENT
Start: 2020-09-11 | End: 2020-09-16 | Stop reason: HOSPADM

## 2020-09-11 RX ORDER — ONDANSETRON 2 MG/ML
4 INJECTION INTRAMUSCULAR; INTRAVENOUS EVERY 6 HOURS PRN
Status: DISCONTINUED | OUTPATIENT
Start: 2020-09-11 | End: 2020-09-12

## 2020-09-11 RX ORDER — ATORVASTATIN CALCIUM 40 MG/1
40 TABLET, FILM COATED ORAL DAILY
Status: DISCONTINUED | OUTPATIENT
Start: 2020-09-11 | End: 2020-09-16 | Stop reason: HOSPADM

## 2020-09-11 RX ORDER — ACETAMINOPHEN 500 MG
500 TABLET ORAL EVERY 4 HOURS PRN
Status: CANCELLED | OUTPATIENT
Start: 2020-09-11

## 2020-09-11 RX ORDER — CYANOCOBALAMIN 1000 UG/ML
1000 INJECTION, SOLUTION INTRAMUSCULAR; SUBCUTANEOUS
Status: DISCONTINUED | OUTPATIENT
Start: 2020-09-16 | End: 2020-09-16 | Stop reason: HOSPADM

## 2020-09-11 RX ORDER — THIAMINE MONONITRATE (VIT B1) 100 MG
100 TABLET ORAL DAILY
Status: DISCONTINUED | OUTPATIENT
Start: 2020-09-12 | End: 2020-09-16 | Stop reason: HOSPADM

## 2020-09-11 RX ORDER — PANTOPRAZOLE SODIUM 40 MG/1
40 TABLET, DELAYED RELEASE ORAL NIGHTLY
Status: DISCONTINUED | OUTPATIENT
Start: 2020-09-11 | End: 2020-09-16 | Stop reason: HOSPADM

## 2020-09-11 RX ORDER — ONDANSETRON 2 MG/ML
4 INJECTION INTRAMUSCULAR; INTRAVENOUS ONCE
Status: COMPLETED | OUTPATIENT
Start: 2020-09-11 | End: 2020-09-11

## 2020-09-11 RX ORDER — SODIUM CHLORIDE 0.9 % (FLUSH) 0.9 %
10 SYRINGE (ML) INJECTION EVERY 12 HOURS SCHEDULED
Status: DISCONTINUED | OUTPATIENT
Start: 2020-09-11 | End: 2020-09-16 | Stop reason: HOSPADM

## 2020-09-11 RX ORDER — SPIRONOLACTONE 25 MG/1
25 TABLET ORAL DAILY
Status: DISCONTINUED | OUTPATIENT
Start: 2020-09-11 | End: 2020-09-16 | Stop reason: HOSPADM

## 2020-09-11 RX ORDER — SODIUM CHLORIDE 0.9 % (FLUSH) 0.9 %
10 SYRINGE (ML) INJECTION AS NEEDED
Status: DISCONTINUED | OUTPATIENT
Start: 2020-09-11 | End: 2020-09-16 | Stop reason: HOSPADM

## 2020-09-11 RX ORDER — HYDROXYZINE HYDROCHLORIDE 25 MG/1
25 TABLET, FILM COATED ORAL NIGHTLY
Status: DISCONTINUED | OUTPATIENT
Start: 2020-09-11 | End: 2020-09-16 | Stop reason: HOSPADM

## 2020-09-11 RX ORDER — ACETAMINOPHEN 650 MG/1
650 SUPPOSITORY RECTAL EVERY 4 HOURS PRN
Status: DISCONTINUED | OUTPATIENT
Start: 2020-09-11 | End: 2020-09-16 | Stop reason: HOSPADM

## 2020-09-11 RX ORDER — ERGOCALCIFEROL 1.25 MG/1
50000 CAPSULE ORAL WEEKLY
COMMUNITY

## 2020-09-11 RX ORDER — ONDANSETRON 4 MG/1
4 TABLET, FILM COATED ORAL EVERY 8 HOURS PRN
Status: DISCONTINUED | OUTPATIENT
Start: 2020-09-11 | End: 2020-09-16 | Stop reason: HOSPADM

## 2020-09-11 RX ORDER — ACETAMINOPHEN 325 MG/1
650 TABLET ORAL EVERY 4 HOURS PRN
Status: DISCONTINUED | OUTPATIENT
Start: 2020-09-11 | End: 2020-09-16 | Stop reason: HOSPADM

## 2020-09-11 RX ORDER — ACETAMINOPHEN 160 MG/5ML
650 SOLUTION ORAL EVERY 4 HOURS PRN
Status: DISCONTINUED | OUTPATIENT
Start: 2020-09-11 | End: 2020-09-16 | Stop reason: HOSPADM

## 2020-09-11 RX ADMIN — TAMSULOSIN HYDROCHLORIDE 0.4 MG: 0.4 CAPSULE ORAL at 21:12

## 2020-09-11 RX ADMIN — SACUBITRIL AND VALSARTAN 1 TABLET: 97; 103 TABLET, FILM COATED ORAL at 21:12

## 2020-09-11 RX ADMIN — MORPHINE SULFATE 1 MG: 2 INJECTION, SOLUTION INTRAMUSCULAR; INTRAVENOUS at 09:49

## 2020-09-11 RX ADMIN — SPIRONOLACTONE 25 MG: 25 TABLET ORAL at 21:12

## 2020-09-11 RX ADMIN — ATORVASTATIN CALCIUM 40 MG: 40 TABLET, FILM COATED ORAL at 21:12

## 2020-09-11 RX ADMIN — FINASTERIDE 5 MG: 5 TABLET, FILM COATED ORAL at 21:13

## 2020-09-11 RX ADMIN — ONDANSETRON 4 MG: 2 INJECTION INTRAMUSCULAR; INTRAVENOUS at 09:48

## 2020-09-11 RX ADMIN — HYDROXYZINE HYDROCHLORIDE 25 MG: 25 TABLET ORAL at 21:13

## 2020-09-11 RX ADMIN — INFLUENZA VIRUS VACCINE 0.5 ML: 15; 15; 15; 15 SUSPENSION INTRAMUSCULAR at 18:17

## 2020-09-11 RX ADMIN — SODIUM CHLORIDE, PRESERVATIVE FREE 10 ML: 5 INJECTION INTRAVENOUS at 21:13

## 2020-09-11 RX ADMIN — PANTOPRAZOLE SODIUM 40 MG: 40 TABLET, DELAYED RELEASE ORAL at 21:12

## 2020-09-11 NOTE — ED NOTES
Spoke with patient's POA, Vj Pastor, over the phone and obtained verbal consent for surgery. Shimon Gorman RN verified verbal consent. Consent form signed and placed on patient chart.     Meena Medel RN  09/11/20 8518

## 2020-09-11 NOTE — ED NOTES
POA AMARJIT BARGO PHONE NUMBERS IF NEEDED TO BE REACHED [phone]     Daija Sepulveda, BREE  09/11/20 5891

## 2020-09-11 NOTE — H&P
"    [facility]  HISTORY AND PHYSICAL    Primary Care Physician: Umm Butt MD    Subjective     Chief Complaint:    Chief Complaint   Patient presents with   • Leg Pain       History of Present Illness:   All of the HPI comes from notes in the chart, RN in ER, MD in ER, patient cannot provide any meaningful subjective data secondary to his inability to speak with words other than \"yes\" or \"no\".  These are also unreliable answers as well.  History of CVA, this expressive aphasia is likely a sequelae of the event.      Mr Pastor is an 87 year old nursing home resident who had been complaining of leg pain on the right side.  His PMH includes CVA, HLD, HTN, CHF, BPH, CAD, GERD who comes in with expressive aphasia and right leg pain.  Venous doppler in ER was negative for DVT.  Labs revealed an elevated total .   Xray right hip shows   IMPRESSION:  Complete transverse fracture through the femoral neck  Dr Araiza was notified via [facility], she saw patient in ER  Plans are for surgery tomorrow if medically cleared.       Review of Systems   Patient cannot provide reliable ROS 2/2 sequelae suffered after a stroke, namely  Expressive aphasia    1. Constitutional: Negative for fever. Negative for chills, diaphoresis, fatigue and unexpected weight change.   2. HENT: Negative for congestion and hearing loss.   3. Eyes: Negative for redness and visual disturbance.   4. Respiratory: negative for shortness of breath. Negative for chest pain . Negative for cough and chest tightness.   5. Cardiovascular: Negative for chest pain and palpitations.   6. Gastrointestinal: Negative for abdominal distention, abdominal pain and blood in stool.   7. Endocrine: Negative for cold intolerance and heat intolerance.   8. Genitourinary: Negative for difficulty urinating, dysuria and frequency.   9. Musculoskeletal: + for right leg pain.   10. Skin: Negative for color change, rash " and wound.   11. Neurological: Negative for syncope, weakness and headaches. + expressive aphasia  12. Hematological: Negative for adenopathy. Does not bruise/bleed easily.   13. Psychiatric/Behavioral: Negative for confusion. The patient is not nervous/anxious.     Past Medical History:   Past Medical History:   Diagnosis Date   • Abnormal CT of the abdomen 6/19/2016   • Alzheimer's disease with late onset (CMS/HCC)    • Anxiety    • Atelectasis    • Atherosclerotic heart disease    • Cardiomyopathy (CMS/HCC)    • Colitis 6/19/2016   • CVA (cerebral vascular accident) (CMS/HCC)    • Diverticulosis    • Dysphagia    • GERD (gastroesophageal reflux disease)    • Hemiplegia (CMS/HCC)    • History of transfusion    • Hyperlipidemia    • Hypertension    • Hypertension    • Hypokalemia    • Inguinal hernia    • Intracardiac thrombosis, not elsewhere classified    • Iron deficiency anemia    • Kidney stone    • Malnutrition (CMS/HCC)    • Muscle weakness    • Neuromuscular dysfunction of bladder    • Pancreatitis        Past Surgical History:  Past Surgical History:   Procedure Laterality Date   • COLONOSCOPY N/A 11/9/2016    Procedure: COLONOSCOPY CPT CODE: 20651;  Surgeon: Lilliam Wilson DO;  Location: [facility];  Service:    • ENDOSCOPY N/A 6/17/2016    Procedure: ESOPHAGOGASTRODUODENOSCOPY;  Surgeon: Alex Robles III, MD;  Location: [facility] OR;  Service:    • ENDOSCOPY N/A 11/9/2016    Procedure: ESOPHAGOGASTRODUODENOSCOPY WITH BIOPSY CPT CODE: 35404;  Surgeon: Lilliam Wilson DO;  Location: [facility];  Service:    • ESOPHAGEAL DILATATION         Family History: family history includes Heart disease in his brother; Stroke in his mother.    Social History:  reports that he has never smoked. He has never used smokeless tobacco. He reports that he does not drink alcohol or use drugs.    Medications:    (Not in a hospital admission)    Allergies:  Allergies   Allergen Reactions   • Penicillins Unknown - Low  "Severity         Objective     Physical Exam:  Vital Signs: /87   Pulse 60   Temp 98.7 °F (37.1 °C) (Oral)   Resp 16   Ht 182.9 cm (72\")   Wt 61.2 kg (135 lb)   SpO2 94%   BMI 18.31 kg/m²      General Appearance: alert, no acute distress. Basically nonverbal, can say yes or no, cannot rely upon validity  Skin: warm and dry.  HEENT: pupils round and reactive to light, oral mucosa normal.  Neck: supple, no JVD, trachea midline.  Lungs: CTA, unlabored breathing effort.  Heart: RRR, normal S1 and S2, no S3, no rub.  Abdomen: soft, non-tender, no palpable bladder, present bowel sounds to auscultation.  Extremities: no clubbing, cyanosis or edema.  Neuro: says yes or no only, expressive aphasia.          Results Reviewed:    Lab Results (last 24 hours)     Procedure Component Value Units Date/Time    COVID PRE-OP / PRE-PROCEDURE SCREENING ORDER (NO ISOLATION) - Swab, Nasal Cavity [[id number]] Collected:  09/11/20 1056    Specimen:  Swab from Nasal Cavity Updated:  09/11/20 1122    Narrative:       The following orders were created for panel order COVID PRE-OP / PRE-PROCEDURE SCREENING ORDER (NO ISOLATION) - Swab, Nasal Cavity.  Procedure                               Abnormality         Status                     ---------                               -----------         ------                     COVID-19, [facility] IN-HOUS...[[id number]]  Normal              Final result                 Please view results for these tests on the individual orders.    COVID-19, [facility] IN-HOUSE,NP Swab (NO TRANSPORT MEDIA) 2 HR TAT - Swab, Nasal Cavity [[id number]]  (Normal) Collected:  09/11/20 1056    Specimen:  Swab from Nasal Cavity Updated:  09/11/20 1122     COVID19 Not Detected    Narrative:       Fact sheet for providers: https://www.fda.gov/media/436836/download     Fact sheet for patients: https://www.fda.gov/media/983424/download    Comprehensive Metabolic Panel [[id number]]  (Abnormal) Collected:  09/11/20 0609    " Specimen:  Blood from Arm, Left Updated:  09/11/20 0636     Glucose 109 mg/dL      BUN 20 mg/dL      Creatinine 1.56 mg/dL      Sodium 136 mmol/L      Potassium 4.2 mmol/L      Chloride 103 mmol/L      CO2 24.7 mmol/L      Calcium 8.7 mg/dL      Total Protein 7.1 g/dL      Albumin 3.76 g/dL      ALT (SGPT) 16 U/L      AST (SGOT) 27 U/L      Alkaline Phosphatase 43 U/L      Total Bilirubin 1.3 mg/dL      eGFR Non African Amer 42 mL/min/1.73      Globulin 3.3 gm/dL      A/G Ratio 1.1 g/dL      BUN/Creatinine Ratio 12.8     Anion Gap 8.3 mmol/L     Narrative:       GFR Normal >60  Chronic Kidney Disease <60  Kidney Failure <15      CK [[id number]]  (Abnormal) Collected:  09/11/20 0609    Specimen:  Blood from Arm, Left Updated:  09/11/20 0636     Creatine Kinase 799 U/L     CBC & Differential [[id number]] Collected:  09/11/20 0609    Specimen:  Blood from Arm, Left Updated:  09/11/20 0616    Narrative:       The following orders were created for panel order CBC & Differential.  Procedure                               Abnormality         Status                     ---------                               -----------         ------                     CBC Auto Differential[[id number]]        Abnormal            Final result                 Please view results for these tests on the individual orders.    CBC Auto Differential [[id number]]  (Abnormal) Collected:  09/11/20 0609    Specimen:  Blood from Arm, Left Updated:  09/11/20 0616     WBC 11.55 10*3/mm3      RBC 4.84 10*6/mm3      Hemoglobin 14.7 g/dL      Hematocrit 45.0 %      MCV 93.0 fL      MCH 30.4 pg      MCHC 32.7 g/dL      RDW 15.6 %      RDW-SD 53.5 fl      MPV 10.9 fL      Platelets 214 10*3/mm3      Neutrophil % 66.1 %      Lymphocyte % 19.4 %      Monocyte % 11.3 %      Eosinophil % 1.9 %      Basophil % 0.5 %      Immature Grans % 0.8 %      Neutrophils, Absolute 7.63 10*3/mm3      Lymphocytes, Absolute 2.24 10*3/mm3      Monocytes, Absolute 1.31 10*3/mm3       Eosinophils, Absolute 0.22 10*3/mm3      Basophils, Absolute 0.06 10*3/mm3      Immature Grans, Absolute 0.09 10*3/mm3      nRBC 0.3 /100 WBC           I have personally reviewed and interpreted available lab data, radiology studies and ECG obtained at time of admission.     Assessment / Plan     Assessment/Problem List:     Closed fracture of neck of right femur (CMS/HCC)          Plan:    Right femur fracture  Evaluated by ortho  Plans for surgery tomorrow if medically cleared by our team  Labs, EKG, imaging, ECHO in place    History of CVA  Continue statin  Neuro checks q 8 hrs  EKG reviewed for cardiogenic arrhthymias that could complicate precipitate stroke  ECHO ordered since EF reduced last year  11/2019 ECHO below  · The study is technically difficult for diagnosis.  · The quality of the study is limited due to poor acoustic windows related to limited views obtained(only apical)  · The left ventricular cavity is mildly dilated.  · The following left ventricular wall segments are akinetic: apical anterior, apical lateral, apical inferior, apical septal and apex.  · Left ventricular systolic function is moderately decreased  · Estimated EF appears to be in the range of 36 - 40%  · Left ventricular diastolic dysfunction (grade I) consistent with impaired relaxation.  · The aortic valve is not well visualized. The aortic valve is grossly normal in structure. No aortic valve regurgitation is present. No aortic valve stenosis is present.  · Mitral valve is not well visualized. The mitral valve is grossly normal in structure. Mild mitral valve regurgitation is present. No significant mitral valve stenosis is present.  · There is no evidence of pericardial effusion.      CAD  Continue BB, statin, asa after surgery, Entresto   Would appreciate CARDS cardiac clearance with this patient    Cardiac diet until midnight, then NPO      Clemente Gabriel DO 09/11/20 11:42    Please note that portions of this note may have been  completed with a voice recognition program. Efforts were made to edit the dictations, but occasionally words are mistranscribed.

## 2020-09-11 NOTE — PLAN OF CARE
Patient resting in bed no complaints of pain, will undergo surgery tomorrow for right hip fx, will monitor

## 2020-09-11 NOTE — ED PROVIDER NOTES
Subjective   87-year-old white male presents with leg pain.  Patient is unable to give detailed history due to previous CVA with expressive aphasia.  He is able to answer some questions, but cannot give a narrative history.  According to nursing home, the patient complained of right thigh pain today.  He had ultrasound which was unremarkable.  When he again complained of pain they sent him to the ED for further work-up.  The patient at this time denies any pain or any other complaints.  He denies any fall.          Review of Systems   All other systems reviewed and are negative.      Past Medical History:   Diagnosis Date   • Abnormal CT of the abdomen 6/19/2016   • Alzheimer's disease with late onset (CMS/HCC)    • Anxiety    • Atelectasis    • Atherosclerotic heart disease    • Cardiomyopathy (CMS/HCC)    • Colitis 6/19/2016   • CVA (cerebral vascular accident) (CMS/HCC)    • Diverticulosis    • Dysphagia    • GERD (gastroesophageal reflux disease)    • Hemiplegia (CMS/HCC)    • History of transfusion    • Hyperlipidemia    • Hypertension    • Hypertension    • Hypokalemia    • Inguinal hernia    • Intracardiac thrombosis, not elsewhere classified    • Iron deficiency anemia    • Kidney stone    • Malnutrition (CMS/HCC)    • Muscle weakness    • Neuromuscular dysfunction of bladder    • Pancreatitis        Allergies   Allergen Reactions   • Penicillins Unknown - Low Severity       Past Surgical History:   Procedure Laterality Date   • COLONOSCOPY N/A 11/9/2016    Procedure: COLONOSCOPY CPT CODE: 11076;  Surgeon: Lilliam Wilson DO;  Location: [facility] OR;  Service:    • ENDOSCOPY N/A 6/17/2016    Procedure: ESOPHAGOGASTRODUODENOSCOPY;  Surgeon: Alex Robles III, MD;  Location: [facility] OR;  Service:    • ENDOSCOPY N/A 11/9/2016    Procedure: ESOPHAGOGASTRODUODENOSCOPY WITH BIOPSY CPT CODE: 36856;  Surgeon: Lilliam Wilson DO;  Location: [facility] OR;  Service:    • ESOPHAGEAL DILATATION         Family  History   Problem Relation Age of Onset   • Stroke Mother    • Heart disease Brother        Social History     Socioeconomic History   • Marital status:      Spouse name: Not on file   • Number of children: Not on file   • Years of education: Not on file   • Highest education level: Not on file   Tobacco Use   • Smoking status: Never Smoker   • Smokeless tobacco: Never Used   Substance and Sexual Activity   • Alcohol use: No   • Drug use: No   • Sexual activity: Defer           Objective   Physical Exam   Constitutional: He is oriented to person, place, and time. He appears well-developed and well-nourished.   HENT:   Head: Normocephalic and atraumatic.   Cardiovascular: Normal rate, regular rhythm and normal heart sounds. Exam reveals no gallop and no friction rub.   No murmur heard.  Pulmonary/Chest: Effort normal and breath sounds normal. He has no wheezes. He has no rales.   Abdominal: Soft. Bowel sounds are normal. He exhibits no distension. There is no tenderness.   Musculoskeletal: Normal range of motion.        Right hip: He exhibits no tenderness and no deformity.   Pelvis stable, nontender.  Able to flex his hip to 90 degrees and to completely extend with pain.   Neurological: He is alert and oriented to person, place, and time.   Skin: Skin is warm and dry.   Psychiatric: He has a normal mood and affect.   Nursing note and vitals reviewed.      Procedures           ED Course  ED Course as of Sep 11 0734   Fri Sep 11, 2020   0553 Review of records sent from [facility] show patient had a right venous Doppler of the lower extremity which was normal with no evidence of DVT he also had a CK and CK-MB which were 788 and 7.3 respectively.  They note that his last CK was 94, and CK-MB 2.8 on 9/3/2020.    [BC]   3765 Discussed with Dr. Meredith.  Patient admitted, see orders.  Notified Dr. Luna, who agrees to see patient in consult.    [BC]      ED Course User Index  [BC] Michele Daugherty MD                                            MDM  Number of Diagnoses or Management Options  Closed fracture of neck of right femur, initial encounter (CMS/[facility]):      Amount and/or Complexity of Data Reviewed  Clinical lab tests: reviewed    Risk of Complications, Morbidity, and/or Mortality  Presenting problems: moderate  Diagnostic procedures: moderate  Management options: moderate        Final diagnoses:   Closed fracture of neck of right femur, initial encounter (CMS/[facility])   History of CVA (cerebrovascular accident)            Michele Daugherty MD  09/11/20 0735

## 2020-09-11 NOTE — CONSULTS
Inpatient Orthopedic Surgery Consult  Consult performed by: Marta Perea APRN  Consult ordered by: Michele Daugherty MD          Patient Identification:  Name:  Demarcus Pastor  Age:  87 y.o.  Sex:  male  :  1933  MRN:  [id number]  Visit Number:  [id number]  Primary care provider:  Umm Butt MD    History of presenting illness: Patient is a 87 year old male who did not have specific injury. He was noted to have complaint of right thigh/leg pain by nursing home staff, he resides at [facility]. He has outpatient ultrasound that was negative for DVT. He presented to the emergency room this morning for further evaluation. Patient has history of CVA with expressive aphasia and is a poor historian secondary to this.   Review of Systems:     Constitution: No weight gain  Eyes:  No loss of vision  ENT:  no epistaxis  Respiratory:  no hemoptysis  Cardiovascular: no CP, no SOB.  Gastrointestinal: No abdominal pain  Genitourinary: No hematuria  Integument: No skin rash  Hematologic / Lymphatic: No petechiae  Musculoskeletal: See HPI  Neurological: No seizures   Endocrine: No tremors  ---------------------------------------------------------------------------------------------------------------------  ---------------------------------------------------------------------------------------------------------------------   Past History:  Family History   Problem Relation Age of Onset   • Stroke Mother    • Heart disease Brother      Past Medical History:   Diagnosis Date   • Abnormal CT of the abdomen 2016   • Alzheimer's disease with late onset (CMS/HCC)    • Anxiety    • Atelectasis    • Atherosclerotic heart disease    • Cardiomyopathy (CMS/HCC)    • Colitis 2016   • CVA (cerebral vascular accident) (CMS/HCC)    • Diverticulosis    • Dysphagia    • GERD (gastroesophageal reflux disease)    • Hemiplegia (CMS/HCC)    • History of transfusion    • Hyperlipidemia    • Hypertension    •  Hypertension    • Hypokalemia    • Inguinal hernia    • Intracardiac thrombosis, not elsewhere classified    • Iron deficiency anemia    • Kidney stone    • Malnutrition (CMS/HCC)    • Muscle weakness    • Neuromuscular dysfunction of bladder    • Pancreatitis      Past Surgical History:   Procedure Laterality Date   • COLONOSCOPY N/A 11/9/2016    Procedure: COLONOSCOPY CPT CODE: 20370;  Surgeon: Lilliam Wilson DO;  Location: [facility] OR;  Service:    • ENDOSCOPY N/A 6/17/2016    Procedure: ESOPHAGOGASTRODUODENOSCOPY;  Surgeon: Alex Robles III, MD;  Location: [facility] OR;  Service:    • ENDOSCOPY N/A 11/9/2016    Procedure: ESOPHAGOGASTRODUODENOSCOPY WITH BIOPSY CPT CODE: 91568;  Surgeon: Lilliam Wilson DO;  Location: [facility] OR;  Service:    • ESOPHAGEAL DILATATION       Social History     Socioeconomic History   • Marital status:      Spouse name: Not on file   • Number of children: Not on file   • Years of education: Not on file   • Highest education level: Not on file   Tobacco Use   • Smoking status: Never Smoker   • Smokeless tobacco: Never Used   Substance and Sexual Activity   • Alcohol use: No   • Drug use: No   • Sexual activity: Defer     ---------------------------------------------------------------------------------------------------------------------   Allergies:  Penicillins  ---------------------------------------------------------------------------------------------------------------------   Prior to Admission Medications     Prescriptions Last Dose Informant Patient Reported? Taking?    acetaminophen (TYLENOL) 500 MG tablet   Yes No    Take 500 mg by mouth Every 6 (Six) Hours As Needed for Mild Pain .    aspirin 81 MG EC tablet  Nursing Home Yes No    Take 81 mg by mouth Daily.    atorvastatin (LIPITOR) 40 MG tablet  Nursing Home Yes No    Take 40 mg by mouth Daily.    carvedilol (COREG) 6.25 MG tablet   Yes No    Take 6.25 mg by mouth 2 (Two) Times a Day With Meals.     clopidogrel (PLAVIX) 75 MG tablet  Nursing Home Yes No    Take 75 mg by mouth Daily.    finasteride (PROSCAR) 5 MG tablet   Yes No    Take 5 mg by mouth Daily.    hydrOXYzine (ATARAX) 25 MG tablet  Nursing Home Yes No    Take 25 mg by mouth Every Night.    loperamide (IMODIUM) 2 MG capsule   Yes No    Take 2 mg by mouth 4 (Four) Times a Day As Needed for Diarrhea.    ondansetron (ZOFRAN) 4 MG tablet   Yes No    Take 4 mg by mouth Every 8 (Eight) Hours As Needed for Nausea or Vomiting.    pantoprazole (PROTONIX) 40 MG EC tablet  Nursing Home Yes No    Take 40 mg by mouth Every Night.    sacubitril-valsartan (ENTRESTO) 24-26 MG tablet  Nursing Home Yes No    Take 1 tablet by mouth Daily.    spironolactone (ALDACTONE) 25 MG tablet   Yes No    Take 25 mg by mouth Daily.    tamsulosin (FLOMAX) 0.4 MG capsule 24 hr capsule   Yes No    Take 1 capsule by mouth Daily.        [facility] Meds:        ---------------------------------------------------------------------------------------------------------------------   Vital Signs:  Temp:  [98.7 °F (37.1 °C)] 98.7 °F (37.1 °C)  Heart Rate:  [60-62] 60  Resp:  [16] 16  BP: (106-147)/(78-96) 135/93      09/11/20  0446   Weight: 61.2 kg (135 lb)     Body mass index is 18.31 kg/m².  ---------------------------------------------------------------------------------------------------------------------   Physical exam:  Constitutional:  Well-developed and well-nourished.  No respiratory distress.      HENT:  Head: Normocephalic and atraumatic. Bilateral PERRLA.   Neck:  Neck supple. No lymphadenopathy.   Cardiovascular:  Normal rate  Pulmonary/Chest:  No respiratory distress  Abdominal:  Soft, no tenderness.   Musculoskeletal:  No tenderness with range of motion. 90 degrees of flexion of hip noted. No pain noted with palpation of the right groin/hip. Diminished pedal pulses upon palpation. AROM right knee, ankle and foot noted. Neurovascularly intact RLE. Cap refill <3 seconds.    Neurological:  Alert and oriented to person, place, and time.   Skin:  Skin is warm and dry  Psychiatric:  Normal mood and affect  Peripheral vascular:  No edema   ---------------------------------------------------------------------------------------------------------------------   .  ---------------------------------------------------------------------------------------------------------------------   Results from last 7 days   Lab Units 09/11/20  0609   WBC 10*3/mm3 11.55*   HEMOGLOBIN g/dL 14.7   HEMATOCRIT % 45.0   MCV fL 93.0   MCHC g/dL 32.7   PLATELETS 10*3/mm3 214         Results from last 7 days   Lab Units 09/11/20  0609   SODIUM mmol/L 136   POTASSIUM mmol/L 4.2   CHLORIDE mmol/L 103   CO2 mmol/L 24.7   BUN mg/dL 20   CREATININE mg/dL 1.56*   EGFR IF NONAFRICN AM mL/min/1.73 42*   CALCIUM mg/dL 8.7   GLUCOSE mg/dL 109*   ALBUMIN g/dL 3.76   BILIRUBIN mg/dL 1.3*   ALK PHOS U/L 43   AST (SGOT) U/L 27   ALT (SGPT) U/L 16   Estimated Creatinine Clearance: 28.9 mL/min (A) (by C-G formula based on SCr of 1.56 mg/dL (H)).  No results found for: AMMONIA  Results from last 7 days   Lab Units 09/11/20  0609   CK TOTAL U/L 799*         No results found for: HGBA1C  Lab Results   Component Value Date    TSH 5.230 (H) 11/10/2019    FREET4 0.98 11/11/2019     No results found for: PREGTESTUR, PREGSERUM, HCG, HCGQUANT  Pain Management Panel     Pain Management Panel Latest Ref Rng & Units 1/13/2014    AMPHETAMINES SCREEN, URINE NEGATIVE Negative    BARBITURATES SCREEN NEGATIVE Negative    BENZODIAZEPINE SCREEN, URINE NEGATIVE Negative    COCAINE SCREEN, URINE NEGATIVE Negative    METHADONE SCREEN, URINE NEGATIVE Negative        No results found for: BLOODCX  No results found for: URINECX  No results found for: WOUNDCX  No results found for: STOOLCX      ---------------------------------------------------------------------------------------------------------------------   Imaging Results (Last 7 Days)     Procedure  Component Value Units Date/Time    XR Femur 2 View Right [[id number]] Collected:  09/11/20 0743     Updated:  09/11/20 0745    Narrative:       CR Femur 2 Vws RT    INDICATION:   Right hip pain    COMPARISON:   Hip series 12/19/2019    FINDINGS:   2 views of the right femur.  There is a complete transverse fracture through the femoral neck. There is slight superior movement of the shaft as relates to the head. Rest the femur is intact.  No bone erosion or destruction. Articulation at the hip and  knee is anatomic.  No foreign body.      Impression:       Complete transverse fracture through the femoral neck    Signer Name: Oleg Garcia MD   Signed: 9/11/2020 7:43 AM   Workstation Name: [facility]    [facility]    XR Hip With or Without Pelvis 2 - 3 View Right [[id number]] Collected:  09/11/20 0742     Updated:  09/11/20 0744    Narrative:       CR Hip Uni Comp Min 2 Vws RT    INDICATION:   Right hip pain today    COMPARISON:   12/19/2019    FINDINGS:  AP and frog-leg lateral view(s) of the right hip.  There is a complete transverse fracture through the femoral neck. There seems be some resorption of the femoral neck and part of the head with perhaps a pseudojoint formation. The bones of the pelvis are  normal. No bone erosion or destruction.        Impression:       Complete fracture through the femoral neck. There may be some or destruction of bone from the head and neck suggesting this is not an acute finding. This was not present 12/19/2019    Signer Name: Oleg Garcia MD   Signed: 9/11/2020 7:42 AM   [facility]: [email or web address]    [facility]        ----------------------------------------------------------------------------------------------------------------------  Assessment and Plan:     1. Right femoral neck fracture, complete, subacute versus chronic with destruction of femoral head    Patient is awaiting admission at this time. He has not been evaluated by  hospitalist currently.   His troponin and lactic are elevated with a BNP of greater than 5,000. He's also noted to hypokalemic.   Will plan for operative fixation tomorrow if cleared by hospitalist and medically stable.   Patient will be scheduled for right bipolar hip replacement with Dr. Araiza. Patient will be NPO after midnight tonight.   Continue traction until operative fixation, COVID 19 testing ordered.     Patient was verbally informed on the nature of the procedure as well as risks and benefits to include but not limited to infection, hemorrhage, pneumothorax, neurovascular injury, anesthesia complications, etc. Patient feels that they have a good understanding of the treatment plan and would like to proceed with surgical intervention.       Thank you for the consult.      THAO Xavier  09/11/20  10:28

## 2020-09-12 ENCOUNTER — APPOINTMENT (OUTPATIENT)
Dept: CARDIOLOGY | Facility: HOSPITAL | Age: 85
End: 2020-09-12

## 2020-09-12 ENCOUNTER — APPOINTMENT (OUTPATIENT)
Dept: GENERAL RADIOLOGY | Facility: HOSPITAL | Age: 85
End: 2020-09-12

## 2020-09-12 ENCOUNTER — ANESTHESIA (OUTPATIENT)
Dept: PERIOP | Facility: HOSPITAL | Age: 85
End: 2020-09-12

## 2020-09-12 ENCOUNTER — ANESTHESIA EVENT (OUTPATIENT)
Dept: PERIOP | Facility: HOSPITAL | Age: 85
End: 2020-09-12

## 2020-09-12 LAB
ANION GAP SERPL CALCULATED.3IONS-SCNC: 10.9 MMOL/L (ref 5–15)
BASOPHILS # BLD AUTO: 0.07 10*3/MM3 (ref 0–0.2)
BASOPHILS NFR BLD AUTO: 0.8 % (ref 0–1.5)
BH CV ECHO MEAS - ACS: 1.6 CM
BH CV ECHO MEAS - AO MAX PG: 3.1 MMHG
BH CV ECHO MEAS - AO MEAN PG: 2 MMHG
BH CV ECHO MEAS - AO ROOT AREA (BSA CORRECTED): 1.9
BH CV ECHO MEAS - AO ROOT AREA: 9.3 CM^2
BH CV ECHO MEAS - AO ROOT DIAM: 3.5 CM
BH CV ECHO MEAS - AO V2 MAX: 87.5 CM/SEC
BH CV ECHO MEAS - AO V2 MEAN: 62.4 CM/SEC
BH CV ECHO MEAS - AO V2 VTI: 19.5 CM
BH CV ECHO MEAS - BSA(HAYCOCK): 1.7 M^2
BH CV ECHO MEAS - BSA: 1.8 M^2
BH CV ECHO MEAS - BZI_BMI: 18.2 KILOGRAMS/M^2
BH CV ECHO MEAS - BZI_METRIC_HEIGHT: 182.9 CM
BH CV ECHO MEAS - BZI_METRIC_WEIGHT: 60.8 KG
BH CV ECHO MEAS - EDV(MOD-SP4): 44.2 ML
BH CV ECHO MEAS - EF(MOD-SP4): 68.8 %
BH CV ECHO MEAS - ESV(MOD-SP4): 13.8 ML
BH CV ECHO MEAS - LA DIMENSION: 3.5 CM
BH CV ECHO MEAS - LA/AO: 1
BH CV ECHO MEAS - LV DIASTOLIC VOL/BSA (35-75): 24.6 ML/M^2
BH CV ECHO MEAS - LV SYSTOLIC VOL/BSA (12-30): 7.7 ML/M^2
BH CV ECHO MEAS - LVLD AP4: 6.6 CM
BH CV ECHO MEAS - LVLS AP4: 5.8 CM
BH CV ECHO MEAS - LVOT AREA (M): 2.8 CM^2
BH CV ECHO MEAS - LVOT AREA: 2.8 CM^2
BH CV ECHO MEAS - LVOT DIAM: 1.9 CM
BH CV ECHO MEAS - MV A MAX VEL: 91.3 CM/SEC
BH CV ECHO MEAS - MV E MAX VEL: 69.8 CM/SEC
BH CV ECHO MEAS - MV E/A: 0.76
BH CV ECHO MEAS - SI(AO): 101.4 ML/M^2
BH CV ECHO MEAS - SI(MOD-SP4): 16.9 ML/M^2
BH CV ECHO MEAS - SV(AO): 182.3 ML
BH CV ECHO MEAS - SV(MOD-SP4): 30.4 ML
BUN SERPL-MCNC: 28 MG/DL (ref 8–23)
BUN/CREAT SERPL: 21.2 (ref 7–25)
CALCIUM SPEC-SCNC: 8.5 MG/DL (ref 8.6–10.5)
CHLORIDE SERPL-SCNC: 105 MMOL/L (ref 98–107)
CO2 SERPL-SCNC: 20.1 MMOL/L (ref 22–29)
CREAT SERPL-MCNC: 1.32 MG/DL (ref 0.76–1.27)
DEPRECATED RDW RBC AUTO: 53.8 FL (ref 37–54)
EOSINOPHIL # BLD AUTO: 0.21 10*3/MM3 (ref 0–0.4)
EOSINOPHIL NFR BLD AUTO: 2.3 % (ref 0.3–6.2)
ERYTHROCYTE [DISTWIDTH] IN BLOOD BY AUTOMATED COUNT: 15.7 % (ref 12.3–15.4)
GFR SERPL CREATININE-BSD FRML MDRD: 51 ML/MIN/1.73
GLUCOSE BLDC GLUCOMTR-MCNC: 114 MG/DL (ref 70–130)
GLUCOSE BLDC GLUCOMTR-MCNC: 125 MG/DL (ref 70–130)
GLUCOSE SERPL-MCNC: 94 MG/DL (ref 65–99)
HCT VFR BLD AUTO: 43.3 % (ref 37.5–51)
HGB BLD-MCNC: 14.1 G/DL (ref 13–17.7)
IMM GRANULOCYTES # BLD AUTO: 0.09 10*3/MM3 (ref 0–0.05)
IMM GRANULOCYTES NFR BLD AUTO: 1 % (ref 0–0.5)
LYMPHOCYTES # BLD AUTO: 2.44 10*3/MM3 (ref 0.7–3.1)
LYMPHOCYTES NFR BLD AUTO: 26.5 % (ref 19.6–45.3)
MAXIMAL PREDICTED HEART RATE: 133 BPM
MCH RBC QN AUTO: 30.4 PG (ref 26.6–33)
MCHC RBC AUTO-ENTMCNC: 32.6 G/DL (ref 31.5–35.7)
MCV RBC AUTO: 93.3 FL (ref 79–97)
MONOCYTES # BLD AUTO: 1.2 10*3/MM3 (ref 0.1–0.9)
MONOCYTES NFR BLD AUTO: 13 % (ref 5–12)
NEUTROPHILS NFR BLD AUTO: 5.2 10*3/MM3 (ref 1.7–7)
NEUTROPHILS NFR BLD AUTO: 56.4 % (ref 42.7–76)
NRBC BLD AUTO-RTO: 0.2 /100 WBC (ref 0–0.2)
PLATELET # BLD AUTO: 224 10*3/MM3 (ref 140–450)
PMV BLD AUTO: 10.6 FL (ref 6–12)
POTASSIUM SERPL-SCNC: 4.2 MMOL/L (ref 3.5–5.2)
RBC # BLD AUTO: 4.64 10*6/MM3 (ref 4.14–5.8)
SODIUM SERPL-SCNC: 136 MMOL/L (ref 136–145)
STRESS TARGET HR: 113 BPM
WBC # BLD AUTO: 9.21 10*3/MM3 (ref 3.4–10.8)

## 2020-09-12 PROCEDURE — 25810000003 SODIUM CHLORIDE 0.9 % WITH KCL 20 MEQ 20-0.9 MEQ/L-% SOLUTION: Performed by: ORTHOPAEDIC SURGERY

## 2020-09-12 PROCEDURE — 25010000002 ONDANSETRON PER 1 MG: Performed by: NURSE ANESTHETIST, CERTIFIED REGISTERED

## 2020-09-12 PROCEDURE — 0SRR0JZ REPLACEMENT OF RIGHT HIP JOINT, FEMORAL SURFACE WITH SYNTHETIC SUBSTITUTE, OPEN APPROACH: ICD-10-PCS | Performed by: ORTHOPAEDIC SURGERY

## 2020-09-12 PROCEDURE — 73501 X-RAY EXAM HIP UNI 1 VIEW: CPT

## 2020-09-12 PROCEDURE — 82962 GLUCOSE BLOOD TEST: CPT

## 2020-09-12 PROCEDURE — 25010000002 PHENYLEPHRINE PER 1 ML: Performed by: NURSE ANESTHETIST, CERTIFIED REGISTERED

## 2020-09-12 PROCEDURE — 93306 TTE W/DOPPLER COMPLETE: CPT

## 2020-09-12 PROCEDURE — 25010000002 FENTANYL CITRATE (PF) 100 MCG/2ML SOLUTION: Performed by: NURSE ANESTHETIST, CERTIFIED REGISTERED

## 2020-09-12 PROCEDURE — 99222 1ST HOSP IP/OBS MODERATE 55: CPT | Performed by: INTERNAL MEDICINE

## 2020-09-12 PROCEDURE — 99232 SBSQ HOSP IP/OBS MODERATE 35: CPT | Performed by: FAMILY MEDICINE

## 2020-09-12 PROCEDURE — 85025 COMPLETE CBC W/AUTO DIFF WBC: CPT | Performed by: FAMILY MEDICINE

## 2020-09-12 PROCEDURE — 80048 BASIC METABOLIC PNL TOTAL CA: CPT | Performed by: FAMILY MEDICINE

## 2020-09-12 PROCEDURE — 97163 PT EVAL HIGH COMPLEX 45 MIN: CPT

## 2020-09-12 PROCEDURE — 93306 TTE W/DOPPLER COMPLETE: CPT | Performed by: INTERNAL MEDICINE

## 2020-09-12 PROCEDURE — C1776 JOINT DEVICE (IMPLANTABLE): HCPCS | Performed by: ORTHOPAEDIC SURGERY

## 2020-09-12 PROCEDURE — 25010000003 CEFAZOLIN SODIUM-DEXTROSE 2-3 GM-%(50ML) RECONSTITUTED SOLUTION: Performed by: ORTHOPAEDIC SURGERY

## 2020-09-12 DEVICE — ARTICUL/EZE FEMORAL HEAD DIAMETER 28MM +5 12/14 TAPER
Type: IMPLANTABLE DEVICE | Site: HIP | Status: FUNCTIONAL
Brand: ARTICUL/EZE

## 2020-09-12 DEVICE — CORAIL HIP SYSTEM CEMENTLESS FEMORAL STEM 12/14 AMT 135 DEGREES KA SIZE 11 HA COATED STANDARD COLLAR
Type: IMPLANTABLE DEVICE | Site: HIP | Status: FUNCTIONAL
Brand: CORAIL

## 2020-09-12 DEVICE — SELF CENTERING BI-POLAR HEAD 28MM ID 52MM OD
Type: IMPLANTABLE DEVICE | Site: HIP | Status: FUNCTIONAL
Brand: SELF CENTERING

## 2020-09-12 DEVICE — DEV CONTRL TISS STRATAFIX SPIRAL MNCRYL UD 3/0 PLS 60CM: Type: IMPLANTABLE DEVICE | Site: HIP | Status: FUNCTIONAL

## 2020-09-12 DEVICE — CAP BIPOL HIP CORAIL ACTIS: Type: IMPLANTABLE DEVICE | Site: HIP | Status: FUNCTIONAL

## 2020-09-12 DEVICE — DEV CONTRL TISS STRATAFIX SYMM PDS PLUS VIL CT-1 45CM: Type: IMPLANTABLE DEVICE | Site: HIP | Status: FUNCTIONAL

## 2020-09-12 RX ORDER — FENTANYL CITRATE 50 UG/ML
INJECTION, SOLUTION INTRAMUSCULAR; INTRAVENOUS AS NEEDED
Status: DISCONTINUED | OUTPATIENT
Start: 2020-09-12 | End: 2020-09-12 | Stop reason: SURG

## 2020-09-12 RX ORDER — ONDANSETRON 4 MG/1
4 TABLET, FILM COATED ORAL EVERY 6 HOURS PRN
Status: DISCONTINUED | OUTPATIENT
Start: 2020-09-12 | End: 2020-09-16 | Stop reason: HOSPADM

## 2020-09-12 RX ORDER — MORPHINE SULFATE 2 MG/ML
2 INJECTION, SOLUTION INTRAMUSCULAR; INTRAVENOUS
Status: DISCONTINUED | OUTPATIENT
Start: 2020-09-12 | End: 2020-09-15

## 2020-09-12 RX ORDER — SODIUM CHLORIDE, SODIUM LACTATE, POTASSIUM CHLORIDE, CALCIUM CHLORIDE 600; 310; 30; 20 MG/100ML; MG/100ML; MG/100ML; MG/100ML
INJECTION, SOLUTION INTRAVENOUS CONTINUOUS PRN
Status: DISCONTINUED | OUTPATIENT
Start: 2020-09-12 | End: 2020-09-12 | Stop reason: SURG

## 2020-09-12 RX ORDER — CLOPIDOGREL BISULFATE 75 MG/1
75 TABLET ORAL DAILY
Status: DISCONTINUED | OUTPATIENT
Start: 2020-09-12 | End: 2020-09-16 | Stop reason: HOSPADM

## 2020-09-12 RX ORDER — ACETAMINOPHEN 325 MG/1
650 TABLET ORAL EVERY 4 HOURS PRN
Status: DISCONTINUED | OUTPATIENT
Start: 2020-09-12 | End: 2020-09-16 | Stop reason: HOSPADM

## 2020-09-12 RX ORDER — MAGNESIUM HYDROXIDE 1200 MG/15ML
LIQUID ORAL AS NEEDED
Status: DISCONTINUED | OUTPATIENT
Start: 2020-09-12 | End: 2020-09-12 | Stop reason: HOSPADM

## 2020-09-12 RX ORDER — SODIUM CHLORIDE, SODIUM LACTATE, POTASSIUM CHLORIDE, CALCIUM CHLORIDE 600; 310; 30; 20 MG/100ML; MG/100ML; MG/100ML; MG/100ML
INJECTION, SOLUTION INTRAVENOUS CONTINUOUS PRN
Status: DISCONTINUED | OUTPATIENT
Start: 2020-09-12 | End: 2020-09-12

## 2020-09-12 RX ORDER — ACETAMINOPHEN 650 MG/1
650 SUPPOSITORY RECTAL EVERY 4 HOURS PRN
Status: DISCONTINUED | OUTPATIENT
Start: 2020-09-12 | End: 2020-09-16 | Stop reason: HOSPADM

## 2020-09-12 RX ORDER — OXYCODONE HYDROCHLORIDE AND ACETAMINOPHEN 5; 325 MG/1; MG/1
1 TABLET ORAL ONCE AS NEEDED
Status: DISCONTINUED | OUTPATIENT
Start: 2020-09-12 | End: 2020-09-12 | Stop reason: HOSPADM

## 2020-09-12 RX ORDER — CARVEDILOL 6.25 MG/1
6.25 TABLET ORAL 2 TIMES DAILY WITH MEALS
Status: DISCONTINUED | OUTPATIENT
Start: 2020-09-12 | End: 2020-09-15

## 2020-09-12 RX ORDER — HYDROCODONE BITARTRATE AND ACETAMINOPHEN 5; 325 MG/1; MG/1
1 TABLET ORAL EVERY 4 HOURS PRN
Status: DISCONTINUED | OUTPATIENT
Start: 2020-09-12 | End: 2020-09-16 | Stop reason: HOSPADM

## 2020-09-12 RX ORDER — IPRATROPIUM BROMIDE AND ALBUTEROL SULFATE 2.5; .5 MG/3ML; MG/3ML
3 SOLUTION RESPIRATORY (INHALATION) ONCE AS NEEDED
Status: DISCONTINUED | OUTPATIENT
Start: 2020-09-12 | End: 2020-09-12 | Stop reason: HOSPADM

## 2020-09-12 RX ORDER — ONDANSETRON 2 MG/ML
4 INJECTION INTRAMUSCULAR; INTRAVENOUS EVERY 6 HOURS PRN
Status: DISCONTINUED | OUTPATIENT
Start: 2020-09-12 | End: 2020-09-16 | Stop reason: HOSPADM

## 2020-09-12 RX ORDER — MORPHINE SULFATE 2 MG/ML
1 INJECTION, SOLUTION INTRAMUSCULAR; INTRAVENOUS
Status: DISCONTINUED | OUTPATIENT
Start: 2020-09-12 | End: 2020-09-16 | Stop reason: HOSPADM

## 2020-09-12 RX ORDER — ONDANSETRON 2 MG/ML
INJECTION INTRAMUSCULAR; INTRAVENOUS AS NEEDED
Status: DISCONTINUED | OUTPATIENT
Start: 2020-09-12 | End: 2020-09-12 | Stop reason: SURG

## 2020-09-12 RX ORDER — CEFAZOLIN SODIUM 2 G/50ML
2 SOLUTION INTRAVENOUS ONCE
Status: DISCONTINUED | OUTPATIENT
Start: 2020-09-12 | End: 2020-09-12 | Stop reason: HOSPADM

## 2020-09-12 RX ORDER — FENTANYL CITRATE 50 UG/ML
50 INJECTION, SOLUTION INTRAMUSCULAR; INTRAVENOUS
Status: DISCONTINUED | OUTPATIENT
Start: 2020-09-12 | End: 2020-09-12 | Stop reason: HOSPADM

## 2020-09-12 RX ORDER — SODIUM CHLORIDE AND POTASSIUM CHLORIDE 150; 900 MG/100ML; MG/100ML
100 INJECTION, SOLUTION INTRAVENOUS CONTINUOUS
Status: DISCONTINUED | OUTPATIENT
Start: 2020-09-12 | End: 2020-09-13

## 2020-09-12 RX ORDER — HYDROCODONE BITARTRATE AND ACETAMINOPHEN 7.5; 325 MG/1; MG/1
2 TABLET ORAL EVERY 4 HOURS PRN
Status: DISCONTINUED | OUTPATIENT
Start: 2020-09-12 | End: 2020-09-15

## 2020-09-12 RX ORDER — NALOXONE HCL 0.4 MG/ML
0.4 VIAL (ML) INJECTION
Status: DISCONTINUED | OUTPATIENT
Start: 2020-09-12 | End: 2020-09-15

## 2020-09-12 RX ORDER — VECURONIUM BROMIDE 1 MG/ML
INJECTION, POWDER, LYOPHILIZED, FOR SOLUTION INTRAVENOUS AS NEEDED
Status: DISCONTINUED | OUTPATIENT
Start: 2020-09-12 | End: 2020-09-12 | Stop reason: SURG

## 2020-09-12 RX ORDER — CEFAZOLIN SODIUM 2 G/50ML
2 SOLUTION INTRAVENOUS EVERY 8 HOURS
Status: COMPLETED | OUTPATIENT
Start: 2020-09-12 | End: 2020-09-12

## 2020-09-12 RX ORDER — MEPERIDINE HYDROCHLORIDE 25 MG/ML
12.5 INJECTION INTRAMUSCULAR; INTRAVENOUS; SUBCUTANEOUS
Status: DISCONTINUED | OUTPATIENT
Start: 2020-09-12 | End: 2020-09-12 | Stop reason: HOSPADM

## 2020-09-12 RX ORDER — NALOXONE HCL 0.4 MG/ML
0.4 VIAL (ML) INJECTION
Status: DISCONTINUED | OUTPATIENT
Start: 2020-09-12 | End: 2020-09-16 | Stop reason: HOSPADM

## 2020-09-12 RX ORDER — ONDANSETRON 2 MG/ML
4 INJECTION INTRAMUSCULAR; INTRAVENOUS AS NEEDED
Status: DISCONTINUED | OUTPATIENT
Start: 2020-09-12 | End: 2020-09-12 | Stop reason: HOSPADM

## 2020-09-12 RX ADMIN — PANTOPRAZOLE SODIUM 40 MG: 40 TABLET, DELAYED RELEASE ORAL at 20:00

## 2020-09-12 RX ADMIN — FENTANYL CITRATE 50 MCG: 50 INJECTION INTRAMUSCULAR; INTRAVENOUS at 10:31

## 2020-09-12 RX ADMIN — FINASTERIDE 5 MG: 5 TABLET, FILM COATED ORAL at 13:51

## 2020-09-12 RX ADMIN — CEFAZOLIN SODIUM 2 G: 2 SOLUTION INTRAVENOUS at 14:36

## 2020-09-12 RX ADMIN — FENTANYL CITRATE 100 MCG: 50 INJECTION INTRAMUSCULAR; INTRAVENOUS at 09:25

## 2020-09-12 RX ADMIN — TAMSULOSIN HYDROCHLORIDE 0.4 MG: 0.4 CAPSULE ORAL at 13:50

## 2020-09-12 RX ADMIN — HYDROXYZINE HYDROCHLORIDE 25 MG: 25 TABLET ORAL at 20:00

## 2020-09-12 RX ADMIN — POLYMYXIN B SULFATE: 500000 INJECTION, POWDER, LYOPHILIZED, FOR SOLUTION INTRAMUSCULAR; INTRATHECAL; INTRAVENOUS; OPHTHALMIC at 10:31

## 2020-09-12 RX ADMIN — VECURONIUM BROMIDE 4 MG: 1 INJECTION, POWDER, LYOPHILIZED, FOR SOLUTION INTRAVENOUS at 08:56

## 2020-09-12 RX ADMIN — EPHEDRINE SULFATE 20 MG: 50 INJECTION, SOLUTION INTRAVENOUS at 09:16

## 2020-09-12 RX ADMIN — EPHEDRINE SULFATE 30 MG: 50 INJECTION, SOLUTION INTRAVENOUS at 09:13

## 2020-09-12 RX ADMIN — ATORVASTATIN CALCIUM 40 MG: 40 TABLET, FILM COATED ORAL at 13:50

## 2020-09-12 RX ADMIN — SACUBITRIL AND VALSARTAN 1 TABLET: 97; 103 TABLET, FILM COATED ORAL at 20:00

## 2020-09-12 RX ADMIN — EPHEDRINE SULFATE 20 MG: 50 INJECTION, SOLUTION INTRAVENOUS at 09:47

## 2020-09-12 RX ADMIN — CARVEDILOL 6.25 MG: 6.25 TABLET, FILM COATED ORAL at 20:01

## 2020-09-12 RX ADMIN — SODIUM CHLORIDE, POTASSIUM CHLORIDE, SODIUM LACTATE AND CALCIUM CHLORIDE: 600; 310; 30; 20 INJECTION, SOLUTION INTRAVENOUS at 09:59

## 2020-09-12 RX ADMIN — SODIUM CHLORIDE, POTASSIUM CHLORIDE, SODIUM LACTATE AND CALCIUM CHLORIDE: 600; 310; 30; 20 INJECTION, SOLUTION INTRAVENOUS at 08:50

## 2020-09-12 RX ADMIN — PHENYLEPHRINE HYDROCHLORIDE 200 MCG: 10 INJECTION, SOLUTION INTRAMUSCULAR; INTRAVENOUS; SUBCUTANEOUS at 09:52

## 2020-09-12 RX ADMIN — CEFAZOLIN 2 G: 1 INJECTION, POWDER, FOR SOLUTION INTRAMUSCULAR; INTRAVENOUS; PARENTERAL at 08:50

## 2020-09-12 RX ADMIN — CEFAZOLIN SODIUM 2 G: 2 SOLUTION INTRAVENOUS at 20:00

## 2020-09-12 RX ADMIN — ONDANSETRON 4 MG: 2 INJECTION INTRAMUSCULAR; INTRAVENOUS at 08:50

## 2020-09-12 RX ADMIN — CLOPIDOGREL 75 MG: 75 TABLET, FILM COATED ORAL at 20:02

## 2020-09-12 RX ADMIN — SODIUM CHLORIDE, PRESERVATIVE FREE 10 ML: 5 INJECTION INTRAVENOUS at 20:00

## 2020-09-12 RX ADMIN — POTASSIUM CHLORIDE AND SODIUM CHLORIDE 100 ML/HR: 900; 150 INJECTION, SOLUTION INTRAVENOUS at 12:35

## 2020-09-12 RX ADMIN — SPIRONOLACTONE 25 MG: 25 TABLET ORAL at 13:51

## 2020-09-12 NOTE — THERAPY EVALUATION
Acute Care - Physical Therapy Initial Evaluation   Drew     Patient Name: Demarcus Pastor  : 1933  MRN: [id number]  Today's Date: 2020   Onset of Illness/Injury or Date of Surgery: 20       PT Assessment (last 12 hours)      PT Evaluation and Treatment     Row Name 20 1433          Physical Therapy Time and Intention    Subjective Information  complains of;pain  -AG     Document Type  evaluation  -AG     Mode of Treatment  individual therapy;physical therapy  -AG     Patient Effort  poor  -AG     Symptoms Noted During/After Treatment  increased pain  -AG     Row Name 20 1433          General Information    Patient Profile Reviewed  yes  -AG     Onset of Illness/Injury or Date of Surgery  20  -AG     Referring Physician  Dr. Araiza  -AG     Patient Observations  alert;cooperative;agree to therapy  -AG     Patient/Family/Caregiver Comments/Observations  pt. supine in bed, alert; portillo catheter, hip abd wedge in place.    -AG     Prior Level of Function  -- per pt. son, pt. ambulatory very short distances since CVA   -AG     Equipment Currently Used at Home  wheelchair primarily w/c bound since 2019 (L CVA)  -AG     Pertinent History of Current Functional Problem  pt. suffered fall at SNF resulting in R subcapital hip fx; underwent bipolar arthroplasty  -AG     Existing Precautions/Restrictions  fall;hip;right  -AG     Limitations/Impairments  safety/cognitive;other (see comments) communication barrier: expressive aphasia  -AG     Equipment Issued to Patient  gait belt  -AG     Risks Reviewed  patient and family:;LOB;increased discomfort;change in vital signs  -AG     Benefits Reviewed  patient and family:;improve function;increase independence;increase strength;increase balance;decrease risk of DVT;increase knowledge  -AG     Barriers to Rehab  previous functional deficit communication deficit: expressive aphasia  -AG     Row Name 20 1433          Living  Environment    Current Living Arrangements  extended care facility resident of SNF  -AG     [facility] Name 09/12/20 1433          Home Use of Assistive/Adaptive Equipment    Equipment Currently Used at Home  wheelchair  -AG     [facility] Name 09/12/20 1433          Sensory    Hearing Status  WFL  -AG     [facility] Name 09/12/20 1433          Vision Assessment/Intervention    Visual Impairment/Limitations  WFL  -AG     [facility] Name 09/12/20 1433          Cognition    Affect/Mental Status (Cognitive)  WFL  -     Orientation Status (Cognition)  unable/difficult to assess  -AG     Follows Commands (Cognition)  follows one-step commands;verbal cues/prompting required;visual cue  -AG     [facility] Name 09/12/20 1433          Pain    Additional Documentation  Pain Scale: FACES Pre/Post-Treatment (Group)  -AG     [facility] Name 09/12/20 1433          Pain Scale: FACES Pre/Post-Treatment    Pain: FACES Scale, Pretreatment  0-->no hurt  -AG     Posttreatment Pain Rating  6-->hurts even more  -AG     Pain Location - Side  Right  -AG     Pain Location  hip  -AG     Row Name 09/12/20 1433          Range of Motion (ROM)    Range of Motion  right lower extremity  -AG     Row Name 09/12/20 1433          Range of Motion Comprehensive    General Range of Motion  lower extremity range of motion deficits identified L LE AROM appears WFL; R shoulder ROM limited 50%  -AG     Comment, General Range of Motion  L U/LE AROM WFL  -AG     Additional Documentation  -- R LE decreased, not formally assessed.    -AG     [facility] Name 09/12/20 1433          Strength (Manual Muscle Testing)    Strength (Manual Muscle Testing)  -- R LE strength NT formally   -AG     Additional Documentation  -- hypertonicity present R side  -AG     [facility] Name 09/12/20 1433          Mobility    Extremity Weight-bearing Status  left lower extremity;right lower extremity  -AG     Left Lower Extremity (Weight-bearing Status)  full weight-bearing (FWB)  -AG     Right Lower Extremity (Weight-bearing Status)   full weight-bearing (FWB)  -     Row Name 09/12/20 1433          Bed Mobility    Bed Mobility  rolling left;scooting/bridging;supine-sit;sit-supine  -AG     Rolling Left [place] (Bed Mobility)  verbal cues;nonverbal cues (demo/gesture);maximum assist (25% patient effort)  -AG     Scooting/Bridging [place] (Bed Mobility)  verbal cues;nonverbal cues (demo/gesture);dependent (less than 25% patient effort)  -AG     Supine-Sit [place] (Bed Mobility)  verbal cues;nonverbal cues (demo/gesture);maximum assist (25% patient effort)  -AG     Sit-Supine [place] (Bed Mobility)  verbal cues;nonverbal cues (demo/gesture);maximum assist (25% patient effort)  -AG     Bed Mobility, Safety Issues  decreased use of arms for pushing/pulling;decreased use of legs for bridging/pushing  -     Assistive Device (Bed Mobility)  bed rails;draw sheet  -     Row Name 09/12/20 1433          Transfers    Transfers  sit-stand transfer;stand-sit transfer  -AG     Maintains Weight-bearing Status (Transfers)  unable to maintain weight-bearing status  -     Sit-Stand [place] (Transfers)  verbal cues;nonverbal cues (demo/gesture);dependent (less than 25% patient effort) unable to achieve  -AG     Stand-Sit [place] (Transfers)  verbal cues;nonverbal cues (demo/gesture);dependent (less than 25% patient effort)  -     Row Name 09/12/20 1433          Sit-Stand Transfer    Assistive Device (Sit-Stand Transfers)  walker, front-wheeled  -     Row Name 09/12/20 1433          Stand-Sit Transfer    Assistive Device (Stand-Sit Transfers)  walker, front-wheeled  -     Row Name 09/12/20 1433          Gait/Stairs (Locomotion)    [place] Level (Gait)  unable to assess  -     Row Name 09/12/20 1433          Safety Issues, Functional Mobility    Safety Issues Affecting Function (Mobility)  safety precaution awareness;safety precautions follow-through/compliance  -     Impairments Affecting Function (Mobility)   balance;coordination;grasp;motor control;muscle tone abnormal;pain;range of motion (ROM);strength  -AG     Row Name 09/12/20 1433          Balance    Balance Assessment  sitting static balance;sitting dynamic balance;sit to stand dynamic balance;standing static balance  -AG     Static Sitting Balance  mild impairment;unsupported;sitting, edge of bed  -AG     Static Standing Balance  unable to perform activity  -AG     Row Name             Wound 09/12/20 0958 Right hip Incision    Wound - Properties Group Placement Date: 09/12/20  - Placement Time: 0958 - Present on Hospital Admission: N  -JH Side: Right  -JH Location: hip  -JH Primary Wound Type: Incision  -JH, s/p bipolar hip replacement  Additional Comments: staples,adaptic, 4x4s, abd pad, tape- drsg is c/d/i  -JH    Retired Wound - Properties Group Date first assessed: 09/12/20  - Time first assessed: 0958 - Present on Hospital Admission: N  -JH Side: Right  -JH Location: hip  -JH Primary Wound Type: Incision  -JH, s/p bipolar hip replacement  Additional Comments: staples,adaptic, 4x4s, abd pad, tape- drsg is c/d/i  -JH    Row Name 09/12/20 1433          Coping    Observed Emotional State  cooperative  -     Verbalized Emotional State  acceptance  -     Family/Support Persons  son  -     Involvement in Care  at bedside  -     Row Name 09/12/20 1433          Plan of Care Review    Plan of Care Reviewed With  patient;son  -     Row Name 09/12/20 1433          Physical Therapy Goals    Bed Mobility Goal Selection (PT)  bed mobility, PT goal 1  -     Transfer Goal Selection (PT)  transfer, PT goal 1;transfer, PT goal 2  -     Row Name 09/12/20 1433          Bed Mobility Goal 1 (PT)    Activity/Assistive Device (Bed Mobility Goal 1, PT)  scooting;sit to supine/supine to sit  -AG     [place] Level/Cues Needed (Bed Mobility Goal 1, PT)  moderate assist (50-74% patient effort)  -     Time Frame (Bed Mobility Goal 1, PT)  by discharge  -      Row Name 09/12/20 1433          Transfer Goal 1 (PT)    Activity/Assistive Device (Transfer Goal 1, PT)  sit-to-stand/stand-to-sit  -AG     [place] Level/Cues Needed (Transfer Goal 1, PT)  minimum assist (75% or more patient effort)  -AG     Time Frame (Transfer Goal 1, PT)  by discharge  -AG     Row Name 09/12/20 1433          Transfer Goal 2 (PT)    Activity/Assistive Device (Transfer Goal 2, PT)  bed-to-chair/chair-to-bed  -AG     [place] Level/Cues Needed (Transfer Goal 2, PT)  moderate assist (50-74% patient effort)  -AG     Time Frame (Transfer Goal 2, PT)  by discharge  -AG     Row Name 09/12/20 1433          Positioning and Restraints    Pre-Treatment Position  in bed  -AG     Post Treatment Position  bed  -AG     In Bed  notified nsg;supine;call light within reach;encouraged to call for assist;with family/caregiver;with other staff;side rails up x3;ABD pillow  -AG     [facility] Name 09/12/20 1433          Therapy Assessment/Plan (PT)    Patient/Family Therapy Goals Statement (PT)  pt. unable to verbalize goals; per son, patient will return to SNF  -AG     Functional Level at Time of Evaluation (PT)  dependent  -AG     PT Diagnosis (PT)  impaired functional mobility  -AG     Rehab Potential (PT)  fair, will monitor progress closely  -AG     Criteria for Skilled Interventions Met (PT)  yes;meets criteria;skilled treatment is necessary  -AG     Predicted Duration of Therapy Intervention (PT)  LOS  -AG     Problem List (PT)  problems related to;balance;coordination;hemiparesis/hemiplegia;mobility;motor control;muscle tone;range of motion (ROM);strength;pain;postural control;communication  -AG     Activity Limitations Related to Problem List (PT)  unable to ambulate safely;unable to transfer safely  -AG     Row Name 09/12/20 1433          PT Evaluation Complexity    History, PT Evaluation Complexity  3 or more personal factors and/or comorbidities  -AG     Row Name 09/12/20 1433          Therapy Plan  Review/Discharge Plan (PT)    Therapy Plan Review (PT)  evaluation/treatment results reviewed;care plan/treatment goals reviewed;risks/benefits reviewed;current/potential barriers reviewed;participants voiced agreement with care plan;participants included;patient;son  -AG       User Key  (r) = Recorded By, (t) = Taken By, (c) = Cosigned By    Initials Name Provider Type     Crystal Cordero, PT Physical Therapist    Mariola Reno RN Registered Nurse        Physical Therapy Education                 Title: PT OT SLP Therapies (Done)     Topic: Physical Therapy (Done)     Point: Mobility training (Done)     Learning Progress Summary           Patient Acceptance, E,D, VU,NR by  at 9/12/2020 1431    Comment: instructed in purpose and intention of PT, importance of mobilization; instructed in hip precautions.                   Point: Home exercise program (Done)     Learning Progress Summary           Patient Acceptance, E,D, VU,NR by  at 9/12/2020 1431    Comment: instructed in purpose and intention of PT, importance of mobilization; instructed in hip precautions.                   Point: Body mechanics (Done)     Learning Progress Summary           Patient Acceptance, E,D, VU,NR by  at 9/12/2020 1431    Comment: instructed in purpose and intention of PT, importance of mobilization; instructed in hip precautions.                   Point: Precautions (Done)     Learning Progress Summary           Patient Acceptance, E,D, VU,NR by  at 9/12/2020 1431    Comment: instructed in purpose and intention of PT, importance of mobilization; instructed in hip precautions.                               User Key     Initials Effective Dates Name Provider Type Discipline     04/03/18 -  Crystal Cordero, PT Physical Therapist PT              PT Recommendation and Plan  Anticipated Discharge Disposition (PT): skilled nursing facility  Planned Therapy Interventions (PT): balance training, bed mobility training, gait  training, patient/family education, postural re-education, ROM (range of motion), strengthening, transfer training(gait training if appropriate)  Therapy Frequency (PT): other (see comments)(6 days/ week; 1-2 times/ day)  Plan of Care Reviewed With: patient, son       Time Calculation:   PT Charges     Row Name 09/12/20 1432             Time Calculation    PT Received On  09/12/20  -      PT Goal Re-Cert Due Date  09/26/20  -        User Key  (r) = Recorded By, (t) = Taken By, (c) = Cosigned By    Initials Name Provider Type    Crystal Zepeda, PT Physical Therapist        Therapy Charges for Today     Code Description Service Date Service Provider Modifiers Qty    [id number] HC PT EVAL HIGH COMPLEXITY 4 9/12/2020 Crystal Cordero, PT GP 1               Crystal Cordero, PT  9/12/2020

## 2020-09-12 NOTE — ANESTHESIA PREPROCEDURE EVALUATION
Anesthesia Evaluation     Patient summary reviewed and Nursing notes reviewed   no history of anesthetic complications:               Airway   Mallampati: III  TM distance: >3 FB  Neck ROM: limited  Dental    (+) upper dentures    Pulmonary - negative pulmonary ROS and normal exam   Cardiovascular - normal exam  Exercise tolerance: poor (<4 METS)    NYHA Classification: III    (+) hypertension, CAD, hyperlipidemia,       Neuro/Psych  (+) CVA, psychiatric history, dementia,     GI/Hepatic/Renal/Endo    (+)  GERD,  renal disease,     Musculoskeletal     (+) neck pain, neck stiffness,   Abdominal  - normal exam   Substance History - negative use     OB/GYN negative ob/gyn ROS         Other - negative ROS                         Anesthesia Plan    ASA 4     general     intravenous induction     Anesthetic plan, all risks, benefits, and alternatives have been provided, discussed and informed consent has been obtained with: patient.

## 2020-09-12 NOTE — PLAN OF CARE
Goal Outcome Evaluation:  Patient rested on and off throughout the night, no complaints or concerns at this time, has remained NPO since midnight, will continue to monitor.

## 2020-09-12 NOTE — PROGRESS NOTES
[facility] HOSPITALIST    PROGRESS NOTE    Name:  Demarcus Pastor   Age:  87 y.o.  Sex:  male  :  1933  MRN:  [id number]   Visit Number:  [id number]  Admission Date:  2020  Date Of Service:  20  Primary Care Physician:  Umm Butt MD     LOS: 1 day :  Patient Care Team:  Umm Butt MD as PCP - General (Geriatric Medicine):    Chief Complaint:      Patient cannot provide subjective data 2/2 expressive aphasia    Subjective / Interval History:   Patient seen and examined bedside  No adverse events overnight        Vital Signs:    Temp:  [97.3 °F (36.3 °C)-98.8 °F (37.1 °C)] 97.4 °F (36.3 °C)  Heart Rate:  [59-74] 63  Resp:  [16-22] 18  BP: (105-164)/() 117/76    Intake and output:    I/O last 3 completed shifts:  In: 120 [P.O.:120]  Out: -   I/O this shift:  In: 1000 [I.V.:1000]  Out: 200 [Urine:100; Blood:100]    Physical Examination:    General Appearance:  Alert and cooperative, not in any acute distress.   Head:  Atraumatic and normocephalic, without obvious abnormality.   Eyes:          PERRLA, conjunctivae and sclerae normal, no Icterus. No pallor. Extra-occular movements are within normal limits.   Neck: Supple, trachea midline, no thyromegaly, no carotid bruit.   Lungs:   Chest shape is normal. Breath sounds heard bilaterally equally.  No crackles or wheezing. No Pleural rub or bronchial breathing.   Heart:  Normal S1 and S2, no murmur, no gallop, no rub. No JVD   Abdomen:   Normal bowel sounds, no masses, no organomegaly. Soft       non-tender, non-distended, no guarding, no rebound tenderness.   Extremities: Moves all extremities well, no edema, no cyanosis, no            clubbing.   Skin: No bleeding, bruising or rash.   Neurologic: Awake, alert but cannot speak anything but yes or no (epressive aphasia)   (unreliable)         Laboratory results:    Results from last 7 days   Lab Units 20  0204 20  0609   SODIUM mmol/L 136 136   POTASSIUM  mmol/L 4.2 4.2   CHLORIDE mmol/L 105 103   CO2 mmol/L 20.1* 24.7   BUN mg/dL 28* 20   CREATININE mg/dL 1.32* 1.56*   CALCIUM mg/dL 8.5* 8.7   BILIRUBIN mg/dL  --  1.3*   ALK PHOS U/L  --  43   ALT (SGPT) U/L  --  16   AST (SGOT) U/L  --  27   GLUCOSE mg/dL 94 109*     Results from last 7 days   Lab Units 09/12/20  0204 09/11/20  0609   WBC 10*3/mm3 9.21 11.55*   HEMOGLOBIN g/dL 14.1 14.7   HEMATOCRIT % 43.3 45.0   PLATELETS 10*3/mm3 224 214         Results from last 7 days   Lab Units 09/11/20  0609   CK TOTAL U/L 799*           I have reviewed the patient's laboratory results.    Radiology results:    Imaging Results (Last 24 Hours)     Procedure Component Value Units Date/Time    XR Hip 1 View Without Pelvis Right (Surgery Only) [[id number]] Collected: 09/12/20 1104     Updated: 09/12/20 1106    Narrative:      POSTOP RIGHT HIP, 9/12/2020      HISTORY:    Postop surgery for right femoral neck fracture.      TECHNIQUE:  Single AP radiograph of the right hip was obtained portably following surgery.      FINDINGS:  Right proximal femoral endoprosthesis appears well positioned and well seated. No visible periprosthetic fracture or joint dislocation.      Impression:      Satisfactory postoperative appearance following right hip arthroplasty.    Signer Name: Jose Shah MD   Signed: 9/12/2020 11:04 AM   Workstation [facility]: [email or web address]    [facility] [place]          I have reviewed the patient's radiology reports.    Medication Review:     I have reviewed the patients active and prn medications.       Assessment:    Closed fracture of neck of right femur (CMS/HCC)          Plan:      Right femur fracture  Evaluated by ortho  POD 0 RIGHT HIP BIPOLAR REPLACEMENT       History of CVA  Continue statin  Neuro checks q 8 hrs  EKG reviewed for cardiogenic arrhthymias that could complicate precipitate stroke  ECHO ordered since EF reduced last year  11/2019 ECHO below  · The study is technically difficult  for diagnosis.  · The quality of the study is limited due to poor acoustic windows related to limited views obtained(only apical)  · The left ventricular cavity is mildly dilated.  · The following left ventricular wall segments are akinetic: apical anterior, apical lateral, apical inferior, apical septal and apex.  · Left ventricular systolic function is moderately decreased  · Estimated EF appears to be in the range of 36 - 40%  · Left ventricular diastolic dysfunction (grade I) consistent with impaired relaxation.  · The aortic valve is not well visualized. The aortic valve is grossly normal in structure. No aortic valve regurgitation is present. No aortic valve stenosis is present.  · Mitral valve is not well visualized. The mitral valve is grossly normal in structure. Mild mitral valve regurgitation is present. No significant mitral valve stenosis is present.  · There is no evidence of pericardial effusion.        CAD  Continue BB, statin, asa after surgery, Entresto   Would appreciate CARDS cardiac clearance with this patient     Cardiac diet       40 minutes was spent in chart review, examining the patient and formulating new plans for care.    Clemente Gabriel DO  09/12/20  11:43 EDT

## 2020-09-12 NOTE — PERIOPERATIVE NURSING NOTE
0855- Top dentures removed per Caio Woodruff CRNA and placed in denture cup prior to intubation & labeled with pt sticker. Will give to PACU RN for transport back to patients room.

## 2020-09-12 NOTE — PLAN OF CARE
Goal Outcome Evaluation:  Plan of Care Reviewed With: patient, son      Patient underwent hip fx repair surgery today. Not complaining of any pain, family visited today as well. Will continue current plan of care.

## 2020-09-12 NOTE — SIGNIFICANT NOTE
09/12/20 0919   OTHER   Discipline physical therapist    PT Deferred Reason   PT Deferred Reason   (consult received, will await further orders from ortho MD.)

## 2020-09-12 NOTE — ANESTHESIA PROCEDURE NOTES
Airway  Urgency: elective    Date/Time: 9/12/2020 8:50 AM  Airway not difficult    General Information and Staff    Patient location during procedure: OR  Anesthesiologist: Deniz Styles DO  CRNA: Caio Woodruff CRNA    Indications and Patient Condition  Indications for airway management: airway protection    Preoxygenated: yes  MILS not maintained throughout  Mask difficulty assessment: 0 - not attempted    Final Airway Details  Final airway type: endotracheal airway      Successful airway: ETT  Cuffed: yes   Successful intubation technique: direct laryngoscopy  Endotracheal tube insertion site: oral  Blade: Estrada  Blade size: 2  ETT size (mm): 7.5  Cormack-Lehane Classification: grade I - full view of glottis  Placement verified by: chest auscultation and capnometry   Measured from: lips  ETT/EBT  to lips (cm): 22  Number of attempts at approach: 1  Assessment: lips, teeth, and gum same as pre-op and atraumatic intubation    Additional Comments  Atraumatic ETT placement, dentition unchanged.

## 2020-09-12 NOTE — CONSULTS
Date of Admit: 9/11/2020  Date of Consult: 09/12/20  No ref. provider found        Closed fracture of neck of right femur (CMS/[facility])      Assessment      1. ASCVD, reported history of PCI in the past but records are unavailable   2. History of ischemic cardiomyopathy, EF of 36-40% in November 2019, he did have mild NSTEMI at that time which was medically managed  3. History of CVA with expressive aphasia  4. Right femur fracture, status post right hip bipolar arthroplasty   5. Acute kidney injury on chronic kidney disease, creatinine 1.20    Recommendations     1. Echo this time showed EF same to mild improvement, WMA suggestive of LAD territory infarct, doing fine with no anginal symptoms, on GDMT  2. Resume his home medications including ASA, Plavix, Coreg , unsure why not on statins given hx of CAD and CVA, will add Lipitor 20.           Reason for consultation: Cardiomyopathy    Subjective       Subjective          Demarcus Pastor is a 87-year-old male with past medical history significant for CVA, coronary artery disease, hyperlipidemia, hypertension and congestive heart failure.  Patient has expressive aphasia and is unable to give a history.  History was obtained from the chart and nursing.  Patient presented to the ED with complaints of leg pain on the right side. There was no specific injury leading up to this pain.  Patient [facility] a [facility] of the [facility].  He does have a history of coronary artery disease in the past but details are unavailable. He denies any chest pain, shortness of breath, dizziness or syncope. He follows with Dr. Dhillon for cardiology. Echocardiogram in 2019 showed and EF of 36-40%.     Cardiac risk factors:arteriosclerotic heart disease, cerebral vascular disease, hypercholesterolemia, hypertension and Sedentary life style    Last Echo: 11/11/2019  · The study is technically difficult for diagnosis.  · The quality of the study is limited due to poor acoustic windows  related to limited views obtained(only apical)  · The left ventricular cavity is mildly dilated.  · The following left ventricular wall segments are akinetic: apical anterior, apical lateral, apical inferior, apical septal and apex.  · Left ventricular systolic function is moderately decreased  · Estimated EF appears to be in the range of 36 - 40%  · Left ventricular diastolic dysfunction (grade I) consistent with impaired relaxation.  · The aortic valve is not well visualized. The aortic valve is grossly normal in structure. No aortic valve regurgitation is present. No aortic valve stenosis is present.  · Mitral valve is not well visualized. The mitral valve is grossly normal in structure. Mild mitral valve regurgitation is present. No significant mitral valve stenosis is present.  · There is no evidence of pericardial effusion.    Past Medical History:   Diagnosis Date   • Abnormal CT of the abdomen 6/19/2016   • Alzheimer's disease with late onset (CMS/HCC)    • Anxiety    • Atelectasis    • Atherosclerotic heart disease    • Cardiomyopathy (CMS/HCC)    • Colitis 6/19/2016   • CVA (cerebral vascular accident) (CMS/HCC)    • Diverticulosis    • Dysphagia    • GERD (gastroesophageal reflux disease)    • Hemiplegia (CMS/HCC)    • History of transfusion    • Hyperlipidemia    • Hypertension    • Hypertension    • Hypokalemia    • Inguinal hernia    • Intracardiac thrombosis, not elsewhere classified    • Iron deficiency anemia    • Kidney stone    • Malnutrition (CMS/HCC)    • Muscle weakness    • Neuromuscular dysfunction of bladder    • Pancreatitis      Past Surgical History:   Procedure Laterality Date   • COLONOSCOPY N/A 11/9/2016    Procedure: COLONOSCOPY CPT CODE: 76457;  Surgeon: Lilliam Wilson DO;  Location: [facility];  Service:    • ENDOSCOPY N/A 6/17/2016    Procedure: ESOPHAGOGASTRODUODENOSCOPY;  Surgeon: Alex Robles III, MD;  Location: [facility] OR;  Service:    • ENDOSCOPY N/A 11/9/2016     Procedure: ESOPHAGOGASTRODUODENOSCOPY WITH BIOPSY CPT CODE: 18306;  Surgeon: Lilliam Wilson DO;  Location: [facility] OR;  Service:    • ESOPHAGEAL DILATATION       Family History   Problem Relation Age of Onset   • Stroke Mother    • Heart disease Brother      Social History     Tobacco Use   • Smoking status: Never Smoker   • Smokeless tobacco: Never Used   Substance Use Topics   • Alcohol use: No   • Drug use: No     Medications Prior to Admission   Medication Sig Dispense Refill Last Dose   • aspirin 81 MG EC tablet Take 81 mg by mouth Daily.   9/10/2020 at Unknown time   • carvedilol (COREG) 6.25 MG tablet Take 6.25 mg by mouth 2 (Two) Times a Day With Meals.   9/10/2020 at Unknown time   • clopidogrel (PLAVIX) 75 MG tablet Take 75 mg by mouth Daily.   9/10/2020 at Unknown time   • cyanocobalamin 1000 MCG/ML injection Inject 1,000 mcg into the appropriate muscle as directed by prescriber Every 30 (Thirty) Days.   8/16/2020   • finasteride (PROSCAR) 5 MG tablet Take 5 mg by mouth Daily.   9/10/2020 at Unknown time   • hydrOXYzine (ATARAX) 25 MG tablet Take 25 mg by mouth Every Night.   9/10/2020 at Unknown time   • pantoprazole (PROTONIX) 40 MG EC tablet Take 40 mg by mouth Every Night.   9/10/2020 at Unknown time   • sacubitril-valsartan (ENTRESTO)  MG tablet Take 1 tablet by mouth 2 (Two) Times a Day.   9/10/2020 at Unknown time   • spironolactone (ALDACTONE) 25 MG tablet Take 12.5 mg by mouth Daily.   9/10/2020 at Unknown time   • tamsulosin (FLOMAX) 0.4 MG capsule 24 hr capsule Take 1 capsule by mouth Daily.   9/10/2020 at Unknown time   • thiamine (VITAMIN B-1) 100 MG tablet Take 100 mg by mouth Daily.   9/10/2020 at Unknown time   • vitamin D (ERGOCALCIFEROL) 1.25 MG (96698 UT) capsule capsule Take 50,000 Units by mouth 1 (One) Time Per Week.   9/5/2020   • acetaminophen (TYLENOL) 500 MG tablet Take 500 mg by mouth Every 4 (Four) Hours As Needed for Mild Pain .   Unknown at Unknown time          Allergies:  Penicillins    Review of Systems   Constitutional: Negative for chills, diaphoresis, fatigue and fever.   HENT: Negative for congestion and trouble swallowing.    Eyes: Negative for photophobia and visual disturbance.   Respiratory: Negative for chest tightness, shortness of breath and wheezing.    Cardiovascular: Positive for leg swelling. Negative for chest pain and palpitations.   Gastrointestinal: Negative for abdominal distention, abdominal pain, nausea and vomiting.   Endocrine: Negative for polyphagia and polyuria.   Genitourinary: Negative for dysuria and hematuria.   Musculoskeletal: Positive for arthralgias. Negative for neck pain and neck stiffness.   Skin: Negative for rash and wound.   Allergic/Immunologic: Negative for food allergies and immunocompromised state.   Neurological: Positive for speech difficulty and weakness. Negative for dizziness, syncope and light-headedness.   Hematological: Does not bruise/bleed easily.   Psychiatric/Behavioral: Negative for confusion and suicidal ideas.       Objective       Objective      Vital Signs  Temp:  [98 °F (36.7 °C)-98.8 °F (37.1 °C)] 98.8 °F (37.1 °C)  Heart Rate:  [59-74] 59  Resp:  [16-22] 16  BP: (105-164)/() 137/87     Vital Signs (last 72 hrs)       09/09 0700  -  09/10 0659 09/10 0700  -  09/11 0659 09/11 0700  -  09/12 0659 09/12 0700  -  09/12 1028   Most Recent    Temp (°F)     98.7    98 -  98.6      98.8     98.8 (37.1)    Heart Rate     62    60 -  74      59     59    Resp     16    18 -  22      16     16    BP     147/78    105/59 -  164/107      137/87     137/87    SpO2 (%)     98    76 -  98      95     95        Body mass index is 18.2 kg/m².    Intake/Output Summary (Last 24 hours) at 9/12/2020 1028  Last data filed at 9/12/2020 0959  Gross per 24 hour   Intake 1120 ml   Output 200 ml   Net 920 ml     Physical Exam  Constitutional:       Appearance: He is well-developed and well-nourished.   HENT:      Head:  Normocephalic and atraumatic.   Eyes:      Extraocular Movements: EOM normal.      Conjunctiva/sclera: Conjunctivae normal.      Pupils: Pupils are equal, round, and reactive to light.   Neck:      Musculoskeletal: Normal range of motion and neck supple.   Cardiovascular:      Rate and Rhythm: Normal rate and regular rhythm.      Heart sounds: Normal heart sounds, S1 normal and S2 normal.   Pulmonary:      Effort: Pulmonary effort is normal.      Breath sounds: Normal breath sounds.   Abdominal:      General: Bowel sounds are normal.      Palpations: Abdomen is soft.   Musculoskeletal: Normal range of motion.   Skin:     General: Skin is warm and dry.   Neurological:      Mental Status: He is alert and oriented to person, place, and time.      Comments: Expressive Aphasia    Psychiatric:         Mood and Affect: Mood and affect normal.         Behavior: Behavior normal.         Thought Content: Thought content normal.         Judgment: Judgment normal.       Results review     Results Review:    I reviewed the patient's new clinical results.  Results from last 7 days   Lab Units 09/11/20  0609   CK TOTAL U/L 799*     Results from last 7 days   Lab Units 09/12/20  0204 09/11/20  0609   WBC 10*3/mm3 9.21 11.55*   HEMOGLOBIN g/dL 14.1 14.7   PLATELETS 10*3/mm3 224 214     Results from last 7 days   Lab Units 09/12/20  0204 09/11/20  0609   SODIUM mmol/L 136 136   POTASSIUM mmol/L 4.2 4.2   CHLORIDE mmol/L 105 103   CO2 mmol/L 20.1* 24.7   BUN mg/dL 28* 20   CREATININE mg/dL 1.32* 1.56*   CALCIUM mg/dL 8.5* 8.7   GLUCOSE mg/dL 94 109*   ALT (SGPT) U/L  --  16   AST (SGOT) U/L  --  27     Lab Results   Component Value Date    INR 0.92 11/10/2019    INR 0.96 11/07/2016     Lab Results   Component Value Date    MG 2.2 11/13/2019    MG 2.2 11/12/2019    MG 2.0 11/10/2019     Lab Results   Component Value Date    TSH 5.230 (H) 11/10/2019    PSA 3.430 11/02/2016    TRIG 62 11/11/2019    HDL 33 (L) 11/11/2019    LDL 45  11/11/2019      Lab Results   Component Value Date    PROBNP 5,412.0 (H) 11/13/2019    PROBNP 5,901.0 (H) 11/12/2019    PROBNP 6,150.0 (H) 11/10/2019       ECG  ECG/EMG Results (last 24 hours)     ** No results found for the last 24 hours. **          Imaging Results (Last 72 Hours)     Procedure Component Value Units Date/Time    XR Hip 1 View Without Pelvis Right (Surgery Only) [[id number]] Resulted: 09/12/20 1025     Updated: 09/12/20 1025    XR Femur 2 View Right [[id number]] Collected: 09/11/20 0743     Updated: 09/11/20 0745    Narrative:      CR Femur 2 Vws RT    INDICATION:   Right hip pain    COMPARISON:   Hip series 12/19/2019    FINDINGS:   2 views of the right femur.  There is a complete transverse fracture through the femoral neck. There is slight superior movement of the shaft as relates to the head. Rest the femur is intact.  No bone erosion or destruction. Articulation at the hip and  knee is anatomic.  No foreign body.      Impression:      Complete transverse fracture through the femoral neck    Signer Name: Oleg Garcia MD   Signed: 9/11/2020 7:43 AM   Workstation Name: [email or web address]    [facility]    XR Hip With or Without Pelvis 2 - 3 View Right [[id number]] Collected: 09/11/20 0742     Updated: 09/11/20 0744    Narrative:      CR Hip Uni Comp Min 2 Vws RT    INDICATION:   Right hip pain today    COMPARISON:   12/19/2019    FINDINGS:  AP and frog-leg lateral view(s) of the right hip.  There is a complete transverse fracture through the femoral neck. There seems be some resorption of the femoral neck and part of the head with perhaps a pseudojoint formation. The bones of the pelvis are  normal. No bone erosion or destruction.        Impression:      Complete fracture through the femoral neck. There may be some or destruction of bone from the head and neck suggesting this is not an acute finding. This was not present 12/19/2019    Signer Name: Oleg Garcia MD   Signed: 9/11/2020  7:42 AM   Workstation Name: [email or web address]    [facility] [place]          I have discussed my impression and recommendations with the patient and family.    Thank you very much for asking us to be involved in this patient's care.  We will follow along with you.      THAO Campos, acting as scribe for Dr. Jj Florentino MD [facility]  09/12/20  10:28 EDT    Please note that portions of this note were completed with a voice recognition program.

## 2020-09-12 NOTE — OP NOTE
Operative report  Preoperative diagnosis right hip subcapital fracture  Postoperative diagnosis right hip subcapital fracture  Surgical procedure right hip bipolar arthroplasty using Depuy corail stem system size with 11  Standard with +5 28 mm head bipolar component 52 mm  After proper patient and limb identification bring to the operating room with general anesthesia left lateral decubitus proper timeout performed chlorhexidine scrubbing and sterile draping posterolateral incision incision over the fascia doug and gluteus muscle dissected bluntly.  Detachment of short rotators and posterior capsule retracted posteriorly osteotomy at 1 cm above the lesser trochanter femoral neck remnant was removed femoral head was removed with a corkscrew and measured at 52 mm the hip was releasing the tissue on the anterior aspect of the neck then good exposure present cookie cutter canal finder and broaching progressively up to the adequate size with about 20 degrees of anteversion.  Final implant is inserted the best fit was the +5 head bipolar component inserted components were reduced show good stability irrigation with pulsatile lavage reapproximation of capsule with FiberWire through the greater trochanter fascia was closed with a strata fix skin was closed in layer Vicryl 1 Monocryl 2 oh and metal clip.  Large dressing was applied patient returned to recovery room in stable condition blood loss 100 cc

## 2020-09-12 NOTE — ANESTHESIA POSTPROCEDURE EVALUATION
Patient: Demarcus Pastor    Procedure Summary     Date: 09/12/20 Room / Location:  [facility] OR 03 /  [facility] OR    Anesthesia Start: 0850 Anesthesia Stop: 1016    Procedure: HIP BIPOLAR REPLACEMENT (Right Hip) Diagnosis:       Closed fracture of neck of right femur, initial encounter (CMS/[facility])      (Closed fracture of neck of right femur, initial encounter (CMS/[facility]) [S72.001A])    Surgeon: Gustavo Araiza MD Provider: Deniz Styles DO    Anesthesia Type: general ASA Status: 4          Anesthesia Type: general    Vitals  Vitals Value Taken Time   /76 09/12/20 1047   Temp 97.3 °F (36.3 °C) 09/12/20 1047   Pulse 62 09/12/20 1047   Resp 16 09/12/20 1047   SpO2 95 % 09/12/20 1047           Post Anesthesia Care and Evaluation    Patient location during evaluation: PHASE II  Patient participation: complete - patient participated  Level of consciousness: awake and alert  Pain score: 1  Pain management: adequate  Airway patency: patent  Anesthetic complications: No anesthetic complications  PONV Status: controlled  Cardiovascular status: acceptable  Respiratory status: acceptable  Hydration status: acceptable

## 2020-09-12 NOTE — BRIEF OP NOTE
HIP BIPOLAR REPLACEMENT  Progress Note    Demarcus Cordovachiquita  9/12/2020    Pre-op Diagnosis:   Closed fracture of neck of right femur, initial encounter (CMS/[facility]) [S72.001A]       Post-Op Diagnosis Codes:     * Closed fracture of neck of right femur, initial encounter (CMS/[facility]) [S72.001A]    Procedure/CPT® Codes:        Procedure(s):  HIP BIPOLAR REPLACEMENT    Surgeon(s):  Gustavo Araiza MD    Anesthesia: Choice    Staff:   Circulator: Mariola Mckeon RN  Scrub Person: Ofelia Salazar  Vendor Representative: Fransisco Canela  Assistant: Kay Ko CSA  Assistant: Kay Ko CSA      Estimated Blood Loss: 100ml    Urine Voided: * No values recorded between 9/12/2020  8:53 AM and 9/12/2020  8:55 AM *    Specimens:                None          Drains:   [REMOVED] Urethral Catheter 18 Fr. (Removed)       Findings: Subcapital [place] 2 fracture    Complications: None    Assistant: Kay Ko CSA  was responsible for performing the following activities: Retraction, Suction, Irrigation, Suturing, Closing and Placing Dressing and their skilled assistance was necessary for the success of this case.    Gustavo Araiza MD     Date: 9/12/2020  Time: 08:55 EDT

## 2020-09-12 NOTE — PERIOPERATIVE NURSING NOTE
16F portillo cath [facility] at 0900 [facility] without complications, urine noted.

## 2020-09-12 NOTE — BRIEF OP NOTE
HIP BIPOLAR REPLACEMENT  Progress Note    Demarcus Cordovachiquita  9/12/2020    Pre-op Diagnosis:   Closed fracture of neck of right femur, initial encounter (CMS/[facility]) [S72.001A]       Post-Op Diagnosis Codes:     * Closed fracture of neck of right femur, initial encounter (CMS/[facility]) [S72.001A]    Procedure/CPT® Codes:        Procedure(s):  HIP BIPOLAR REPLACEMENT    Surgeon(s):  Gustavo Araiza MD    Anesthesia: Choice    Staff:   Circulator: Mariola Mckeon RN  Scrub Person: Ofelia Salazar  Vendor Representative: Fransisco Canela  Assistant: Kay Ko CSA  Assistant: Kay Ko CSA      Estimated Blood Loss: 100ml    Urine Voided: * No values recorded between 9/12/2020  8:52 AM and 9/12/2020 10:10 AM *    Specimens:                None          Drains:   [REMOVED] Urethral Catheter 18 Fr. (Removed)       Findings: Subcapital fracture    Complications: None    Assistant: Kay Ko CSA  was responsible for performing the following activities: Retraction, Suction, Irrigation, Suturing, Closing and Placing Dressing and their skilled assistance was necessary for the success of this case.    Gustavo Araiza MD     Date: 9/12/2020  Time: 10:10 EDT

## 2020-09-13 LAB
ANION GAP SERPL CALCULATED.3IONS-SCNC: 9.6 MMOL/L (ref 5–15)
BASOPHILS # BLD AUTO: 0.04 10*3/MM3 (ref 0–0.2)
BASOPHILS NFR BLD AUTO: 0.3 % (ref 0–1.5)
BUN SERPL-MCNC: 30 MG/DL (ref 8–23)
BUN/CREAT SERPL: 21.9 (ref 7–25)
CALCIUM SPEC-SCNC: 7.9 MG/DL (ref 8.6–10.5)
CHLORIDE SERPL-SCNC: 108 MMOL/L (ref 98–107)
CO2 SERPL-SCNC: 19.4 MMOL/L (ref 22–29)
CREAT SERPL-MCNC: 1.37 MG/DL (ref 0.76–1.27)
DEPRECATED RDW RBC AUTO: 55.8 FL (ref 37–54)
EOSINOPHIL # BLD AUTO: 0.01 10*3/MM3 (ref 0–0.4)
EOSINOPHIL NFR BLD AUTO: 0.1 % (ref 0.3–6.2)
ERYTHROCYTE [DISTWIDTH] IN BLOOD BY AUTOMATED COUNT: 15.7 % (ref 12.3–15.4)
GFR SERPL CREATININE-BSD FRML MDRD: 49 ML/MIN/1.73
GLUCOSE SERPL-MCNC: 161 MG/DL (ref 65–99)
HCT VFR BLD AUTO: 44.2 % (ref 37.5–51)
HGB BLD-MCNC: 13.7 G/DL (ref 13–17.7)
IMM GRANULOCYTES # BLD AUTO: 0.05 10*3/MM3 (ref 0–0.05)
IMM GRANULOCYTES NFR BLD AUTO: 0.4 % (ref 0–0.5)
LYMPHOCYTES # BLD AUTO: 1.21 10*3/MM3 (ref 0.7–3.1)
LYMPHOCYTES NFR BLD AUTO: 10 % (ref 19.6–45.3)
MCH RBC QN AUTO: 29.9 PG (ref 26.6–33)
MCHC RBC AUTO-ENTMCNC: 31 G/DL (ref 31.5–35.7)
MCV RBC AUTO: 96.5 FL (ref 79–97)
MONOCYTES # BLD AUTO: 1.6 10*3/MM3 (ref 0.1–0.9)
MONOCYTES NFR BLD AUTO: 13.2 % (ref 5–12)
NEUTROPHILS NFR BLD AUTO: 76 % (ref 42.7–76)
NEUTROPHILS NFR BLD AUTO: 9.25 10*3/MM3 (ref 1.7–7)
NRBC BLD AUTO-RTO: 0.3 /100 WBC (ref 0–0.2)
PLATELET # BLD AUTO: 235 10*3/MM3 (ref 140–450)
PMV BLD AUTO: 10.5 FL (ref 6–12)
POTASSIUM SERPL-SCNC: 4.8 MMOL/L (ref 3.5–5.2)
RBC # BLD AUTO: 4.58 10*6/MM3 (ref 4.14–5.8)
SODIUM SERPL-SCNC: 137 MMOL/L (ref 136–145)
WBC # BLD AUTO: 12.16 10*3/MM3 (ref 3.4–10.8)

## 2020-09-13 PROCEDURE — 99233 SBSQ HOSP IP/OBS HIGH 50: CPT | Performed by: FAMILY MEDICINE

## 2020-09-13 PROCEDURE — 80048 BASIC METABOLIC PNL TOTAL CA: CPT | Performed by: ORTHOPAEDIC SURGERY

## 2020-09-13 PROCEDURE — 85025 COMPLETE CBC W/AUTO DIFF WBC: CPT | Performed by: ORTHOPAEDIC SURGERY

## 2020-09-13 PROCEDURE — 94799 UNLISTED PULMONARY SVC/PX: CPT

## 2020-09-13 RX ORDER — ATORVASTATIN CALCIUM 40 MG/1
40 TABLET, FILM COATED ORAL DAILY
Qty: 30 TABLET | Refills: 0 | Status: SHIPPED | OUTPATIENT
Start: 2020-09-13

## 2020-09-13 RX ORDER — ONDANSETRON 4 MG/1
4 TABLET, FILM COATED ORAL EVERY 6 HOURS PRN
Qty: 28 TABLET | Refills: 0 | Status: SHIPPED | OUTPATIENT
Start: 2020-09-13 | End: 2020-09-20

## 2020-09-13 RX ORDER — ONDANSETRON 4 MG/1
4 TABLET, FILM COATED ORAL EVERY 6 HOURS PRN
Qty: 28 TABLET | Refills: 0 | Status: SHIPPED | OUTPATIENT
Start: 2020-09-13 | End: 2020-09-13

## 2020-09-13 RX ORDER — HYDROCODONE BITARTRATE AND ACETAMINOPHEN 7.5; 325 MG/1; MG/1
2 TABLET ORAL EVERY 6 HOURS PRN
Qty: 24 TABLET | Refills: 0 | Status: SHIPPED | OUTPATIENT
Start: 2020-09-13 | End: 2020-09-15 | Stop reason: HOSPADM

## 2020-09-13 RX ADMIN — SACUBITRIL AND VALSARTAN 1 TABLET: 97; 103 TABLET, FILM COATED ORAL at 08:10

## 2020-09-13 RX ADMIN — APIXABAN 2.5 MG: 2.5 TABLET, FILM COATED ORAL at 08:11

## 2020-09-13 RX ADMIN — CLOPIDOGREL 75 MG: 75 TABLET, FILM COATED ORAL at 08:10

## 2020-09-13 RX ADMIN — CARVEDILOL 6.25 MG: 6.25 TABLET, FILM COATED ORAL at 08:10

## 2020-09-13 RX ADMIN — SPIRONOLACTONE 25 MG: 25 TABLET ORAL at 08:10

## 2020-09-13 RX ADMIN — SODIUM CHLORIDE, PRESERVATIVE FREE 10 ML: 5 INJECTION INTRAVENOUS at 20:57

## 2020-09-13 RX ADMIN — HYDROCODONE BITARTRATE AND ACETAMINOPHEN 1 TABLET: 5; 325 TABLET ORAL at 08:10

## 2020-09-13 RX ADMIN — PANTOPRAZOLE SODIUM 40 MG: 40 TABLET, DELAYED RELEASE ORAL at 20:55

## 2020-09-13 RX ADMIN — CARVEDILOL 6.25 MG: 6.25 TABLET, FILM COATED ORAL at 17:45

## 2020-09-13 RX ADMIN — APIXABAN 2.5 MG: 2.5 TABLET, FILM COATED ORAL at 20:55

## 2020-09-13 RX ADMIN — ATORVASTATIN CALCIUM 40 MG: 40 TABLET, FILM COATED ORAL at 08:10

## 2020-09-13 RX ADMIN — Medication 100 MG: at 08:11

## 2020-09-13 RX ADMIN — TAMSULOSIN HYDROCHLORIDE 0.4 MG: 0.4 CAPSULE ORAL at 08:10

## 2020-09-13 RX ADMIN — HYDROXYZINE HYDROCHLORIDE 25 MG: 25 TABLET ORAL at 20:55

## 2020-09-13 RX ADMIN — FINASTERIDE 5 MG: 5 TABLET, FILM COATED ORAL at 08:11

## 2020-09-13 RX ADMIN — SODIUM CHLORIDE, PRESERVATIVE FREE 10 ML: 5 INJECTION INTRAVENOUS at 08:10

## 2020-09-13 NOTE — PLAN OF CARE
Goal Outcome Evaluation:  Patient has mostly sleep tonight, no complaints or concerns at this time, will continue to monitor.

## 2020-09-13 NOTE — PROGRESS NOTES
Inpatient Progress Note    Demarcus Pastor  Date: 09/13/20  MRN: [id number]      Subjective: Patient is a 87 year old male who is status post right hip bipolar arthroplasty, patient is post-op day #1. No complications noted overnight, patient is requiring pain medication as needed. Pain controlled with medication. Patient has global aphasia.       Objective:  Vitals:    09/13/20 0459 09/13/20 0643 09/13/20 0840 09/13/20 1014   BP:  113/67  106/54   BP Location:  Right arm  Right arm   Patient Position:  Lying  Lying   Pulse:  78  71   Resp:  18  16   Temp:  98.9 °F (37.2 °C)  99 °F (37.2 °C)   TempSrc:  Axillary  Oral   SpO2:  97% 98% 93%   Weight: 60.7 kg (133 lb 12.8 oz)      Height:              Physical Exam:  Constitutional:  Well-developed and well-nourished.  No respiratory distress.      Musculoskeletal: Surgical incision intact with staples, post-op dressing in place, clean dry and intact. No copious drainage. No purulent drainage no indication of infection or cyanosis. + pedal pulses. Neuro intact RLE. Cap refill <3 seconds.   Neurological:  Alert and oriented.  Skin:  Skin is warm and dry.   Psychiatric:  Normal mood and affect.  Peripheral vascular:  No peripheral edema    Labs:    Results from last 7 days   Lab Units 09/13/20 0349 09/12/20  0204 09/11/20  0609   WBC 10*3/mm3 12.16* 9.21 11.55*   HEMOGLOBIN g/dL 13.7 14.1 14.7   HEMATOCRIT % 44.2 43.3 45.0   MCV fL 96.5 93.3 93.0   MCHC g/dL 31.0* 32.6 32.7   PLATELETS 10*3/mm3 235 224 214         Results from last 7 days   Lab Units 09/13/20  0349 09/12/20  0204 09/11/20  0609   SODIUM mmol/L 137 136 136   POTASSIUM mmol/L 4.8 4.2 4.2   CHLORIDE mmol/L 108* 105 103   CO2 mmol/L 19.4* 20.1* 24.7   BUN mg/dL 30* 28* 20   CREATININE mg/dL 1.37* 1.32* 1.56*   EGFR IF NONAFRICN AM mL/min/1.73 49* 51* 42*   CALCIUM mg/dL 7.9* 8.5* 8.7   GLUCOSE mg/dL 161* 94 109*   ALBUMIN g/dL  --   --  3.76   BILIRUBIN mg/dL  --   --  1.3*   ALK PHOS U/L  --   --  43   AST  (SGOT) U/L  --   --  27   ALT (SGPT) U/L  --   --  16   Estimated Creatinine Clearance: 32.6 mL/min (A) (by C-G formula based on SCr of 1.37 mg/dL (H)).  No results found for: AMMONIA  Results from last 7 days   Lab Units 09/11/20  0609   CK TOTAL U/L 799*         No results found for: HGBA1C  Lab Results   Component Value Date    TSH 5.230 (H) 11/10/2019    FREET4 0.98 11/11/2019         Pain Management Panel     Pain Management Panel Latest Ref Rng & Units 1/13/2014    AMPHETAMINES SCREEN, URINE NEGATIVE Negative    BARBITURATES SCREEN NEGATIVE Negative    BENZODIAZEPINE SCREEN, URINE NEGATIVE Negative    COCAINE SCREEN, URINE NEGATIVE Negative    METHADONE SCREEN, URINE NEGATIVE Negative          No results found for: BLOODCX  No results found for: URINECX  No results found for: WOUNDCX  No results found for: STOOLCX              Radiology:  Imaging Results (Last 72 Hours)     Procedure Component Value Units Date/Time    XR Hip 1 View Without Pelvis Right (Surgery Only) [[id number]] Collected: 09/12/20 1104     Updated: 09/12/20 1106    Narrative:      POSTOP RIGHT HIP, 9/12/2020      HISTORY:    Postop surgery for right femoral neck fracture.      TECHNIQUE:  Single AP radiograph of the right hip was obtained portably following surgery.      FINDINGS:  Right proximal femoral endoprosthesis appears well positioned and well seated. No visible periprosthetic fracture or joint dislocation.      Impression:      Satisfactory postoperative appearance following right hip arthroplasty.    Signer Name: Jose Shah MD   Signed: 9/12/2020 11:04 AM   Workstation Name: [facility]    [facility]    XR Femur 2 View Right [[id number]] Collected: 09/11/20 0743     Updated: 09/11/20 0745    Narrative:      CR Femur 2 Vws RT    INDICATION:   Right hip pain    COMPARISON:   Hip series 12/19/2019    FINDINGS:   2 views of the right femur.  There is a complete transverse fracture through the femoral  neck. There is slight superior movement of the shaft as relates to the head. Rest the femur is intact.  No bone erosion or destruction. Articulation at the hip and  knee is anatomic.  No foreign body.      Impression:      Complete transverse fracture through the femoral neck    Signer Name: Oleg Garcia MD   Signed: 9/11/2020 7:43 AM   Workstation Name: [facility]    [facility]    XR Hip With or Without Pelvis 2 - 3 View Right [[id number]] Collected: 09/11/20 0742     Updated: 09/11/20 0744    Narrative:      CR Hip Uni Comp Min 2 Vws RT    INDICATION:   Right hip pain today    COMPARISON:   12/19/2019    FINDINGS:  AP and frog-leg lateral view(s) of the right hip.  There is a complete transverse fracture through the femoral neck. There seems be some resorption of the femoral neck and part of the head with perhaps a pseudojoint formation. The bones of the pelvis are  normal. No bone erosion or destruction.        Impression:      Complete fracture through the femoral neck. There may be some or destruction of bone from the head and neck suggesting this is not an acute finding. This was not present 12/19/2019    Signer Name: Oleg Garcia MD   Signed: 9/11/2020 7:42 AM   Workstation Name: [facility]    [facility]            Assessment:   1. Right hip bipolar arthroplasty    Plan:   Patient is doing well post-operatively. Patient was encouraged to progress his mobility as tolerated with PT/OT. Patient rehab will be complicated secondary to previous history of CVA. Patient plans to return to nursing home facility at discharge to continue rehabilitation. Eliquis for DVT prophylaxis, continue for 35 days post-op. Dressing changes ordered, may discontinue when no drainage is noted to wound. Follow up with Dr. Araiza in 2 weeks for staple removal and re-evaluation.       THAO Xavier  09/13/20  11:09 EDT

## 2020-09-13 NOTE — PROGRESS NOTES
[facility] HOSPITALIST    PROGRESS NOTE    Name:  Demarcus Pastor   Age:  87 y.o.  Sex:  male  :  1933  MRN:  [id number]   Visit Number:  [id number]  Admission Date:  2020  Date Of Service:  20  Primary Care Physician:  Umm Butt MD     LOS: 2 days :  Patient Care Team:  Umm Butt MD as PCP - General (Geriatric Medicine):    Chief Complaint:      Pain right leg    Subjective / Interval History:   Patient cannot provide meaningful subjective data    Vital Signs:    Temp:  [97.4 °F (36.3 °C)-99 °F (37.2 °C)] 99 °F (37.2 °C)  Heart Rate:  [57-86] 71  Resp:  [16-18] 16  BP: (106-141)/(54-98) 106/54    Intake and output:    I/O last 3 completed shifts:  In: 3243.9 [P.O.:470; I.V.:2773.9]  Out: 1350 [Urine:1250; Blood:100]  I/O this shift:  In: 360 [P.O.:360]  Out: -     Physical Examination:    General Appearance:  Alert to self and cooperative, not in any acute distress.   Head:  Atraumatic and normocephalic, without obvious abnormality.   Eyes:          PERRLA, conjunctivae and sclerae normal, no Icterus. No pallor. Extra-occular movements are within normal limits.   Neck: Supple, trachea midline, no thyromegaly, no carotid bruit.   Lungs:   Chest shape is normal. Breath sounds heard bilaterally equally.  No crackles or wheezing. No Pleural rub or bronchial breathing.   Heart:  Normal S1 and S2, no murmur, no gallop, no rub. No JVD   Abdomen:   Normal bowel sounds, no masses, no organomegaly. Soft       non-tender, non-distended, no guarding, no rebound tenderness.   Extremities: Moves upper  extremities well, no edema, no cyanosis, no        clubbing.   Skin: No bleeding, bruising or rash.   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    Laboratory results:    Results from last 7 days   Lab Units 20  0349 20  0204 20  0609   SODIUM mmol/L 137 136 136   POTASSIUM mmol/L 4.8 4.2 4.2   CHLORIDE mmol/L 108* 105 103   CO2 mmol/L  19.4* 20.1* 24.7   BUN mg/dL 30* 28* 20   CREATININE mg/dL 1.37* 1.32* 1.56*   CALCIUM mg/dL 7.9* 8.5* 8.7   BILIRUBIN mg/dL  --   --  1.3*   ALK PHOS U/L  --   --  43   ALT (SGPT) U/L  --   --  16   AST (SGOT) U/L  --   --  27   GLUCOSE mg/dL 161* 94 109*     Results from last 7 days   Lab Units 09/13/20  0349 09/12/20  0204 09/11/20  0609   WBC 10*3/mm3 12.16* 9.21 11.55*   HEMOGLOBIN g/dL 13.7 14.1 14.7   HEMATOCRIT % 44.2 43.3 45.0   PLATELETS 10*3/mm3 235 224 214         Results from last 7 days   Lab Units 09/11/20  0609   CK TOTAL U/L 799*           I have reviewed the patient's laboratory results.    Radiology results:    Imaging Results (Last 24 Hours)     ** No results found for the last 24 hours. **          I have reviewed the patient's radiology reports.    Medication Review:     I have reviewed the patients active and prn medications.       Assessment:    Closed fracture of neck of right femur (CMS/HCC)          Plan:    Right femur fracture  Evaluated by ortho  POD 0 RIGHT HIP BIPOLAR REPLACEMENT        History of CVA  Continue statin  Neuro checks q 8 hrs  EKG reviewed for cardiogenic arrhthymias that could complicate precipitate stroke  ECHO ordered since EF reduced last year  11/2019 ECHO below  · The study is technically difficult for diagnosis.  · The quality of the study is limited due to poor acoustic windows related to limited views obtained(only apical)  · The left ventricular cavity is mildly dilated.  · The following left ventricular wall segments are akinetic: apical anterior, apical lateral, apical inferior, apical septal and apex.  · Left ventricular systolic function is moderately decreased  · Estimated EF appears to be in the range of 36 - 40%  · Left ventricular diastolic dysfunction (grade I) consistent with impaired relaxation.  · The aortic valve is not well visualized. The aortic valve is grossly normal in structure. No aortic valve regurgitation is present. No aortic valve  stenosis is present.  · Mitral valve is not well visualized. The mitral valve is grossly normal in structure. Mild mitral valve regurgitation is present. No significant mitral valve stenosis is present.  · There is no evidence of pericardial effusion.        CAD  Continue BB, statin, asa after surgery, Entresto   Would appreciate CARDS cardiac clearance with this patient     Cardiac diet      Tried to DC patient on Sunday but he is a DISCHARGE/READMIT and they didn't have the staff to complete that on Sunday.        40 minutes was spent in chart review, examining the patient and formulating new plans for care.    Clemente Gabriel DO  09/13/20  12:21 EDT

## 2020-09-13 NOTE — DISCHARGE SUMMARY
[facility] HOSPITALIST   DISCHARGE SUMMARY      Name:  Demarcus Pastor   Age:  87 y.o.  Sex:  male  :  1933  MRN:  [id number]   Visit Number:  [id number]    Admission Date:  2020  Date of Discharge:  2020  Primary Care Physician:  Umm Butt MD    Discharge Diagnoses:     History of CVA  Expressive aphasia      Closed fracture of neck of right femur (CMS/[facility])      Presenting Problem:    Closed fracture of neck of right femur, initial encounter (CMS/[facility]) [S72.001A]     Consults:     Consults     Date and Time Order Name Status Description    2020 1154 Inpatient Cardiology Consult Completed     2020 1132 Inpatient Orthopedic Surgery Consult Completed     2020 0732 Ortho (on-call MD unless specified) Completed     2020 0731 Hospitalist (on-call MD unless specified) Completed         Consulting Physician(s)     Provider Relationship Specialty    Gustavo Araiza MD Consulting Physician Orthopedic Surgery    Clemente Gabriel DO Consulting Physician Hospitalist    Jj Florentino MD Consulting Physician Interventional Cardiology          Procedures Performed:    Procedure(s):  HIP BIPOLAR REPLACEMENT         History of presenting illness:    Mr Pastor is an 87 year old nursing home resident who had been complaining of leg pain on the right side.  His PMH includes CVA, HLD, HTN, CHF, BPH, CAD, GERD who comes in with expressive aphasia and right leg pain.  Venous doppler in ER was negative for DVT.  Labs revealed an elevated total .   Xray right hip shows   IMPRESSION:  Complete transverse fracture through the femoral neck  Dr Araiza was notified via THAO Xavier, she saw patient in ER  Plans are for surgery tomorrow if medically cleared.     Hospital Course:  Admitted 2020  Had right hip bipolar arthroplasty Dr Araiza 2020  Did well post op  PT evaluated/treated  ORTHO signed off 2020  eliquis 2.5 mg bid 35  days total post op      Vital Signs:    Temp:  [97.4 °F (36.3 °C)-99 °F (37.2 °C)] 99 °F (37.2 °C)  Heart Rate:  [57-86] 71  Resp:  [16-18] 16  BP: (106-144)/(54-98) 106/54    Physical Exam:    General Appearance:  Alert and cooperative,does not speak other than yes or no unreliable answers, not in any acute distress.   Head:  Atraumatic and normocephalic, without obvious abnormality.   Eyes:          PERRLA, conjunctivae and sclerae normal, no Icterus. No pallor. Extra-occular movements are within normal limits.   Ears:  Ears appear intact with no abnormalities noted.   Throat: No oral lesions, no thrush, oral mucosa moist.   Neck: Supple, trachea midline, no thyromegaly, no carotid bruit.   Back:   No kyphoscoliosis present. No tenderness to palpation,   range of motion normal.   Lungs:   Chest shape is normal. Breath sounds heard bilaterally equally.  No crackles or wheezing. No Pleural rub or bronchial breathing.   Heart:  Normal S1 and S2, no murmur, no gallop, no rub. No JVD.   Abdomen:   Normal bowel sounds, no masses, no organomegaly. Soft     non-tender, non-distended, no guarding, no rebound tenderness.   Extremities: Moves both upper extremities well, no edema, no cyanosis, no clubbing. S/p right hip arthroplasty limited ROM right lower ext   Pulses: Pulses palpable and equal bilaterally.   Skin: No bleeding, bruising or rash.   Lymph nodes: No palpable adenopathy.   Neurologic: Alert to self. Expressive aphasia s/p CVA. No tremors. No facial asymetry.       Pertinent Lab Results:     Results from last 7 days   Lab Units 09/13/20  0349 09/12/20  0204 09/11/20  0609   SODIUM mmol/L 137 136 136   POTASSIUM mmol/L 4.8 4.2 4.2   CHLORIDE mmol/L 108* 105 103   CO2 mmol/L 19.4* 20.1* 24.7   BUN mg/dL 30* 28* 20   CREATININE mg/dL 1.37* 1.32* 1.56*   CALCIUM mg/dL 7.9* 8.5* 8.7   BILIRUBIN mg/dL  --   --  1.3*   ALK PHOS U/L  --   --  43   ALT (SGPT) U/L  --   --  16   AST (SGOT) U/L  --   --  27   GLUCOSE mg/dL  161* 94 109*     Results from last 7 days   Lab Units 09/13/20  0349 09/12/20  0204 09/11/20  0609   WBC 10*3/mm3 12.16* 9.21 11.55*   HEMOGLOBIN g/dL 13.7 14.1 14.7   HEMATOCRIT % 44.2 43.3 45.0   PLATELETS 10*3/mm3 235 224 214         Results from last 7 days   Lab Units 09/11/20  0609   CK TOTAL U/L 799*                           Invalid input(s): USDES,  BLOODU, NITRITITE, BACT, EP  Pain Management Panel     Pain Management Panel Latest Ref Rng & Units 1/13/2014    AMPHETAMINES SCREEN, URINE NEGATIVE Negative    BARBITURATES SCREEN NEGATIVE Negative    BENZODIAZEPINE SCREEN, URINE NEGATIVE Negative    COCAINE SCREEN, URINE NEGATIVE Negative    METHADONE SCREEN, URINE NEGATIVE Negative              Pertinent Radiology Results:    Imaging Results (All)     Procedure Component Value Units Date/Time    XR Hip 1 View Without Pelvis Right (Surgery Only) [[id number]] Collected: 09/12/20 1104     Updated: 09/12/20 1106    Narrative:      POSTOP RIGHT HIP, 9/12/2020      HISTORY:    Postop surgery for right femoral neck fracture.      TECHNIQUE:  Single AP radiograph of the right hip was obtained portably following surgery.      FINDINGS:  Right proximal femoral endoprosthesis appears well positioned and well seated. No visible periprosthetic fracture or joint dislocation.      Impression:      Satisfactory postoperative appearance following right hip arthroplasty.    Signer Name: Jose Shah MD   Signed: 9/12/2020 11:04 AM   Workstation Name: [facility]    [facility]    XR Femur 2 View Right [[id number]] Collected: 09/11/20 0743     Updated: 09/11/20 0745    Narrative:      CR Femur 2 Vws RT    INDICATION:   Right hip pain    COMPARISON:   Hip series 12/19/2019    FINDINGS:   2 views of the right femur.  There is a complete transverse fracture through the femoral neck. There is slight superior movement of the shaft as relates to the head. Rest the femur is intact.  No bone erosion or  destruction. Articulation at the hip and  knee is anatomic.  No foreign body.      Impression:      Complete transverse fracture through the femoral neck    Signer Name: Oleg Garcia MD   Signed: 9/11/2020 7:43 AM   [facility]    [facility]    XR Hip With or Without Pelvis 2 - 3 View Right [[id number]] Collected: 09/11/20 0742     Updated: 09/11/20 0744    Narrative:      CR Hip Uni Comp Min 2 Vws RT    INDICATION:   Right hip pain today    COMPARISON:   12/19/2019    FINDINGS:  AP and frog-leg lateral view(s) of the right hip.  There is a complete transverse fracture through the femoral neck. There seems be some resorption of the femoral neck and part of the head with perhaps a pseudojoint formation. The bones of the pelvis are  normal. No bone erosion or destruction.        Impression:      Complete fracture through the femoral neck. There may be some or destruction of bone from the head and neck suggesting this is not an acute finding. This was not present 12/19/2019    Signer Name: Oleg Garcia MD   Signed: 9/11/2020 7:42 AM   [facility]station [facility]: [facility]          Condition on Discharge:      Stable.    Code status during the hospital stay:    Code Status and Medical Interventions:   Ordered at: 09/11/20 1135     Level Of Support Discussed With:    Patient     Code Status:    CPR     Medical Interventions (Level of Support Prior to Arrest):    Full       Discharge Disposition:    Home or Self Care    Discharge Medications:       Discharge Medications      New Medications      Instructions Start Date   apixaban 2.5 MG tablet tablet  Commonly known as: ELIQUIS   2.5 mg, Oral, Every 12 Hours Scheduled      HYDROcodone-acetaminophen 7.5-325 MG per tablet  Commonly known as: NORCO   2 tablets, Oral, Every 6 Hours PRN      ondansetron 4 MG tablet  Commonly known as: ZOFRAN   4 mg, Oral, Every 6 Hours PRN         Continue These Medications        Instructions Start Date   acetaminophen 500 MG tablet  Commonly known as: TYLENOL   500 mg, Oral, Every 4 Hours PRN      aspirin 81 MG EC tablet   81 mg, Oral, Daily      carvedilol 6.25 MG tablet  Commonly known as: COREG   6.25 mg, Oral, 2 Times Daily With Meals      clopidogrel 75 MG tablet  Commonly known as: PLAVIX   75 mg, Oral, Daily      cyanocobalamin 1000 MCG/ML injection   1,000 mcg, Intramuscular, Every 30 Days      finasteride 5 MG tablet  Commonly known as: PROSCAR   5 mg, Oral, Daily      hydrOXYzine 25 MG tablet  Commonly known as: ATARAX   25 mg, Oral, Nightly      pantoprazole 40 MG EC tablet  Commonly known as: PROTONIX   40 mg, Oral, Nightly      sacubitril-valsartan  MG tablet  Commonly known as: ENTRESTO   1 tablet, Oral, 2 Times Daily      spironolactone 25 MG tablet  Commonly known as: ALDACTONE   12.5 mg, Oral, Daily      tamsulosin 0.4 MG capsule 24 hr capsule  Commonly known as: FLOMAX   1 capsule, Oral, Daily      thiamine 100 MG tablet  Commonly known as: VITAMIN B-1   100 mg, Oral, Daily      vitamin D 1.25 MG (57763 UT) capsule capsule  Commonly known as: ERGOCALCIFEROL   50,000 Units, Oral, Weekly             Discharge Diet:     Diet Instructions     Diet: Regular      Discharge Diet: Regular          Activity at Discharge:     Activity Instructions     Other Activity Instructions      Activity Instructions: per PT staff and director of nursing home rehab care          Follow-up Appointments:    Additional Instructions for the Follow-ups that You Need to Schedule     Discharge Follow-up with Specified Provider: Dr Araiza for staple removal and follow up; 2 Weeks   As directed      To: Dr Araiza for staple removal and follow up    Follow Up: 2 Weeks           Follow-up Information     Umm Butt MD .    Specialty: Geriatric Medicine  Contact information:  [address] [place] [address] [phone]                   No future appointments.    Additional  Instructions for the Follow-ups that You Need to Schedule     Discharge Follow-up with Specified Provider: Dr Araiza for staple removal and follow up; 2 Weeks   As directed      To: Dr Araiza for staple removal and follow up    Follow Up: 2 Weeks               Test Results Pending at Discharge:    none       Clemente Gabriel DO  09/13/20  11:49 EDT    Time spent: 35 minutes

## 2020-09-13 NOTE — PLAN OF CARE
Goal Outcome Evaluation:  Plan of Care Reviewed With: patient  Progress: improving  Outcome Summary: Pt resting in bed. Pt expressed some pain in hip earlier this morning. See MAR. Pt voices no complaints at this moment. No s/s of acute distress at this time. Will continue to monitor.

## 2020-09-14 ENCOUNTER — APPOINTMENT (OUTPATIENT)
Dept: GENERAL RADIOLOGY | Facility: HOSPITAL | Age: 85
End: 2020-09-14

## 2020-09-14 LAB
ANION GAP SERPL CALCULATED.3IONS-SCNC: 11.7 MMOL/L (ref 5–15)
BACTERIA UR QL AUTO: ABNORMAL /HPF
BASOPHILS # BLD AUTO: 0.07 10*3/MM3 (ref 0–0.2)
BASOPHILS NFR BLD AUTO: 0.6 % (ref 0–1.5)
BILIRUB UR QL STRIP: NEGATIVE
BUN SERPL-MCNC: 27 MG/DL (ref 8–23)
BUN/CREAT SERPL: 22.7 (ref 7–25)
CALCIUM SPEC-SCNC: 7.5 MG/DL (ref 8.6–10.5)
CHLORIDE SERPL-SCNC: 96 MMOL/L (ref 98–107)
CLARITY UR: CLEAR
CO2 SERPL-SCNC: 20.3 MMOL/L (ref 22–29)
COLOR UR: ABNORMAL
CREAT SERPL-MCNC: 1.19 MG/DL (ref 0.76–1.27)
DEPRECATED RDW RBC AUTO: 55.8 FL (ref 37–54)
EOSINOPHIL # BLD AUTO: 0.26 10*3/MM3 (ref 0–0.4)
EOSINOPHIL NFR BLD AUTO: 2.1 % (ref 0.3–6.2)
ERYTHROCYTE [DISTWIDTH] IN BLOOD BY AUTOMATED COUNT: 15.8 % (ref 12.3–15.4)
GFR SERPL CREATININE-BSD FRML MDRD: 58 ML/MIN/1.73
GLUCOSE SERPL-MCNC: 111 MG/DL (ref 65–99)
GLUCOSE UR STRIP-MCNC: NEGATIVE MG/DL
HCT VFR BLD AUTO: 36.5 % (ref 37.5–51)
HGB BLD-MCNC: 11.5 G/DL (ref 13–17.7)
HGB UR QL STRIP.AUTO: ABNORMAL
HYALINE CASTS UR QL AUTO: ABNORMAL /LPF
IMM GRANULOCYTES # BLD AUTO: 0.09 10*3/MM3 (ref 0–0.05)
IMM GRANULOCYTES NFR BLD AUTO: 0.7 % (ref 0–0.5)
KETONES UR QL STRIP: NEGATIVE
LEUKOCYTE ESTERASE UR QL STRIP.AUTO: ABNORMAL
LYMPHOCYTES # BLD AUTO: 2.39 10*3/MM3 (ref 0.7–3.1)
LYMPHOCYTES NFR BLD AUTO: 19.6 % (ref 19.6–45.3)
MCH RBC QN AUTO: 30.3 PG (ref 26.6–33)
MCHC RBC AUTO-ENTMCNC: 31.5 G/DL (ref 31.5–35.7)
MCV RBC AUTO: 96.3 FL (ref 79–97)
MONOCYTES # BLD AUTO: 1.54 10*3/MM3 (ref 0.1–0.9)
MONOCYTES NFR BLD AUTO: 12.6 % (ref 5–12)
NEUTROPHILS NFR BLD AUTO: 64.4 % (ref 42.7–76)
NEUTROPHILS NFR BLD AUTO: 7.85 10*3/MM3 (ref 1.7–7)
NITRITE UR QL STRIP: NEGATIVE
NRBC BLD AUTO-RTO: 0.2 /100 WBC (ref 0–0.2)
PH UR STRIP.AUTO: 6 [PH] (ref 5–8)
PLATELET # BLD AUTO: 225 10*3/MM3 (ref 140–450)
PMV BLD AUTO: 10.4 FL (ref 6–12)
POTASSIUM SERPL-SCNC: 4 MMOL/L (ref 3.5–5.2)
PROT UR QL STRIP: ABNORMAL
RBC # BLD AUTO: 3.79 10*6/MM3 (ref 4.14–5.8)
RBC # UR: ABNORMAL /HPF
REF LAB TEST METHOD: ABNORMAL
SODIUM SERPL-SCNC: 128 MMOL/L (ref 136–145)
SODIUM SERPL-SCNC: 136 MMOL/L (ref 136–145)
SP GR UR STRIP: 1.02 (ref 1–1.03)
SQUAMOUS #/AREA URNS HPF: ABNORMAL /HPF
UROBILINOGEN UR QL STRIP: ABNORMAL
WBC # BLD AUTO: 12.2 10*3/MM3 (ref 3.4–10.8)
WBC UR QL AUTO: ABNORMAL /HPF

## 2020-09-14 PROCEDURE — 92611 MOTION FLUOROSCOPY/SWALLOW: CPT

## 2020-09-14 PROCEDURE — 74018 RADEX ABDOMEN 1 VIEW: CPT | Performed by: RADIOLOGY

## 2020-09-14 PROCEDURE — 25010000002 CEFTRIAXONE PER 250 MG: Performed by: INTERNAL MEDICINE

## 2020-09-14 PROCEDURE — 74230 X-RAY XM SWLNG FUNCJ C+: CPT | Performed by: RADIOLOGY

## 2020-09-14 PROCEDURE — 97110 THERAPEUTIC EXERCISES: CPT

## 2020-09-14 PROCEDURE — 84295 ASSAY OF SERUM SODIUM: CPT | Performed by: INTERNAL MEDICINE

## 2020-09-14 PROCEDURE — 85025 COMPLETE CBC W/AUTO DIFF WBC: CPT | Performed by: ORTHOPAEDIC SURGERY

## 2020-09-14 PROCEDURE — 99231 SBSQ HOSP IP/OBS SF/LOW 25: CPT | Performed by: INTERNAL MEDICINE

## 2020-09-14 PROCEDURE — 74230 X-RAY XM SWLNG FUNCJ C+: CPT

## 2020-09-14 PROCEDURE — 81001 URINALYSIS AUTO W/SCOPE: CPT | Performed by: INTERNAL MEDICINE

## 2020-09-14 PROCEDURE — 80048 BASIC METABOLIC PNL TOTAL CA: CPT | Performed by: ORTHOPAEDIC SURGERY

## 2020-09-14 PROCEDURE — 87086 URINE CULTURE/COLONY COUNT: CPT | Performed by: INTERNAL MEDICINE

## 2020-09-14 PROCEDURE — 74018 RADEX ABDOMEN 1 VIEW: CPT

## 2020-09-14 PROCEDURE — 94799 UNLISTED PULMONARY SVC/PX: CPT

## 2020-09-14 RX ORDER — BISACODYL 10 MG
10 SUPPOSITORY, RECTAL RECTAL ONCE
Status: COMPLETED | OUTPATIENT
Start: 2020-09-14 | End: 2020-09-14

## 2020-09-14 RX ORDER — POLYETHYLENE GLYCOL 3350 17 G/17G
17 POWDER, FOR SOLUTION ORAL DAILY
Status: DISCONTINUED | OUTPATIENT
Start: 2020-09-14 | End: 2020-09-16 | Stop reason: HOSPADM

## 2020-09-14 RX ORDER — MAGNESIUM CARB/ALUMINUM HYDROX 105-160MG
296 TABLET,CHEWABLE ORAL ONCE
Status: DISCONTINUED | OUTPATIENT
Start: 2020-09-14 | End: 2020-09-16 | Stop reason: HOSPADM

## 2020-09-14 RX ORDER — LACTULOSE 10 G/15ML
30 SOLUTION ORAL ONCE
Status: COMPLETED | OUTPATIENT
Start: 2020-09-14 | End: 2020-09-14

## 2020-09-14 RX ORDER — AMOXICILLIN 250 MG
2 CAPSULE ORAL 2 TIMES DAILY
Status: DISCONTINUED | OUTPATIENT
Start: 2020-09-14 | End: 2020-09-16 | Stop reason: HOSPADM

## 2020-09-14 RX ORDER — SODIUM CHLORIDE 9 MG/ML
50 INJECTION, SOLUTION INTRAVENOUS CONTINUOUS
Status: DISCONTINUED | OUTPATIENT
Start: 2020-09-14 | End: 2020-09-14

## 2020-09-14 RX ADMIN — POLYETHYLENE GLYCOL (3350) 17 G: 17 POWDER, FOR SOLUTION ORAL at 14:12

## 2020-09-14 RX ADMIN — SACUBITRIL AND VALSARTAN 1 TABLET: 97; 103 TABLET, FILM COATED ORAL at 08:26

## 2020-09-14 RX ADMIN — FINASTERIDE 5 MG: 5 TABLET, FILM COATED ORAL at 08:26

## 2020-09-14 RX ADMIN — TAMSULOSIN HYDROCHLORIDE 0.4 MG: 0.4 CAPSULE ORAL at 08:25

## 2020-09-14 RX ADMIN — SACUBITRIL AND VALSARTAN 1 TABLET: 97; 103 TABLET, FILM COATED ORAL at 21:24

## 2020-09-14 RX ADMIN — LACTULOSE 30 G: 10 SOLUTION ORAL at 16:56

## 2020-09-14 RX ADMIN — ATORVASTATIN CALCIUM 40 MG: 40 TABLET, FILM COATED ORAL at 08:26

## 2020-09-14 RX ADMIN — APIXABAN 2.5 MG: 2.5 TABLET, FILM COATED ORAL at 21:24

## 2020-09-14 RX ADMIN — Medication 100 MG: at 08:25

## 2020-09-14 RX ADMIN — HYDROCODONE BITARTRATE AND ACETAMINOPHEN 1 TABLET: 5; 325 TABLET ORAL at 21:33

## 2020-09-14 RX ADMIN — CARVEDILOL 6.25 MG: 6.25 TABLET, FILM COATED ORAL at 08:25

## 2020-09-14 RX ADMIN — APIXABAN 2.5 MG: 2.5 TABLET, FILM COATED ORAL at 08:26

## 2020-09-14 RX ADMIN — BISACODYL 10 MG: 10 SUPPOSITORY RECTAL at 14:12

## 2020-09-14 RX ADMIN — SPIRONOLACTONE 25 MG: 25 TABLET ORAL at 08:25

## 2020-09-14 RX ADMIN — CLOPIDOGREL 75 MG: 75 TABLET, FILM COATED ORAL at 08:26

## 2020-09-14 RX ADMIN — CEFTRIAXONE 1 G: 1 INJECTION, POWDER, FOR SOLUTION INTRAMUSCULAR; INTRAVENOUS at 16:56

## 2020-09-14 RX ADMIN — DOCUSATE SODIUM 50 MG AND SENNOSIDES 8.6 MG 2 TABLET: 8.6; 5 TABLET, FILM COATED ORAL at 21:24

## 2020-09-14 RX ADMIN — HYDROXYZINE HYDROCHLORIDE 25 MG: 25 TABLET ORAL at 21:24

## 2020-09-14 RX ADMIN — SODIUM CHLORIDE, PRESERVATIVE FREE 10 ML: 5 INJECTION INTRAVENOUS at 21:25

## 2020-09-14 RX ADMIN — DOCUSATE SODIUM 50 MG AND SENNOSIDES 8.6 MG 2 TABLET: 8.6; 5 TABLET, FILM COATED ORAL at 14:12

## 2020-09-14 RX ADMIN — SODIUM CHLORIDE, PRESERVATIVE FREE 10 ML: 5 INJECTION INTRAVENOUS at 08:26

## 2020-09-14 RX ADMIN — SODIUM CHLORIDE 50 ML/HR: 9 INJECTION, SOLUTION INTRAVENOUS at 05:07

## 2020-09-14 RX ADMIN — CARVEDILOL 6.25 MG: 6.25 TABLET, FILM COATED ORAL at 16:56

## 2020-09-14 NOTE — PROGRESS NOTES
[facility]  PROGRESS NOTE     Patient Identification:  Name:  Demarcus Pastor  Age:  87 y.o.  Sex:  male  :  1933  MRN:  [id number]  Visit Number:  [id number]  Primary Care Provider:  Umm Butt MD    Length of stay:  3    ----------------------------------------------------------------------------------------------------------------------  Subjective     Chief Complaint:  Follow up for femur fracture     Subjective:  Today, the patient is no complaints.  Unfortunately he is unable to provide any history due to history of dementia.  Nursing reports no bowel movement since admission.    ----------------------------------------------------------------------------------------------------------------------  Objective     Current Hospital Meds:  apixaban, 2.5 mg, Oral, Q12H  atorvastatin, 40 mg, Oral, Daily  carvedilol, 6.25 mg, Oral, BID With Meals  cholecalciferol, 50,000 Units, Oral, Weekly  clopidogrel, 75 mg, Oral, Daily  [START ON 2020] cyanocobalamin, 1,000 mcg, Intramuscular, Q30 Days  finasteride, 5 mg, Oral, Daily  hydrOXYzine, 25 mg, Oral, Nightly  pantoprazole, 40 mg, Oral, Nightly  polyethylene glycol, 17 g, Oral, Daily  sacubitril-valsartan, 1 tablet, Oral, BID  senna-docusate sodium, 2 tablet, Oral, BID  sodium chloride, 10 mL, Intravenous, Q12H  spironolactone, 25 mg, Oral, Daily  tamsulosin, 0.4 mg, Oral, Daily  thiamine, 100 mg, Oral, Daily         ----------------------------------------------------------------------------------------------------------------------  Vital Signs:  Temp:  [97.7 °F (36.5 °C)-99.1 °F (37.3 °C)] 98.6 °F (37 °C)  Heart Rate:  [60-68] 68  Resp:  [17-20] 18  BP: (102-120)/(54-72) 111/72  Mean Arterial Pressure (Non-Invasive) for the past 24 hrs (Last 3 readings):   Noninvasive MAP (mmHg)   20 0300 90   20 1829 69     SpO2 Percentage    20 0613 20 1032 20 1425      SpO2: 94% 95% 96%     SpO2:  [94 %-98 %] 96 %  on  Flow (L/min):  [2] 2;   Device (Oxygen Therapy): room air    Body mass index is 18.76 kg/m².  Wt Readings from Last 3 Encounters:   09/14/20 62.7 kg (138 lb 5 oz)   08/07/20 51.7 kg (114 lb)   12/19/19 52.6 kg (116 lb)        Intake/Output Summary (Last 24 hours) at 9/14/2020 1826  Last data filed at 9/14/2020 1425  Gross per 24 hour   Intake 835 ml   Output 1225 ml   Net -390 ml     Diet Pureed; Nectar / Syrup Thick  ----------------------------------------------------------------------------------------------------------------------  Physical exam:   GEN: Chronically ill-appearing, frail, no acute distress  GI: Lightly distended, some tenderness to palpation in the left upper quadrant area.  NEURO:  intention tremor, facial droop    ----------------------------------------------------------------------------------------------------------------------          Results from last 7 days   Lab Units 09/14/20  0249 09/13/20  0349 09/12/20  0204   WBC 10*3/mm3 12.20* 12.16* 9.21   HEMOGLOBIN g/dL 11.5* 13.7 14.1   HEMATOCRIT % 36.5* 44.2 43.3   MCV fL 96.3 96.5 93.3   MCHC g/dL 31.5 31.0* 32.6   PLATELETS 10*3/mm3 225 235 224     Results from last 7 days   Lab Units 09/14/20  0958 09/14/20  0249 09/13/20  0349 09/12/20  0204 09/11/20  0609   SODIUM mmol/L 136 128* 137 136 136   POTASSIUM mmol/L  --  4.0 4.8 4.2 4.2   CHLORIDE mmol/L  --  96* 108* 105 103   CO2 mmol/L  --  20.3* 19.4* 20.1* 24.7   BUN mg/dL  --  27* 30* 28* 20   CREATININE mg/dL  --  1.19 1.37* 1.32* 1.56*   EGFR IF NONAFRICN AM mL/min/1.73  --  58* 49* 51* 42*   CALCIUM mg/dL  --  7.5* 7.9* 8.5* 8.7   GLUCOSE mg/dL  --  111* 161* 94 109*   ALBUMIN g/dL  --   --   --   --  3.76   BILIRUBIN mg/dL  --   --   --   --  1.3*   ALK PHOS U/L  --   --   --   --  43   AST (SGOT) U/L  --   --   --   --  27   ALT (SGPT) U/L  --   --   --   --  16   Estimated Creatinine Clearance: 38.8 mL/min (by C-G formula based  on SCr of 1.19 mg/dL).  No results found for: AMMONIA    Glucose   Date/Time Value Ref Range Status   09/12/2020 1630 125 70 - 130 mg/dL Final   09/12/2020 1109 114 70 - 130 mg/dL Final     No results found for: HGBA1C  Lab Results   Component Value Date    TSH 5.230 (H) 11/10/2019    FREET4 0.98 11/11/2019       No results found for: BLOODCX  No results found for: URINECX  No results found for: WOUNDCX  No results found for: STOOLCX  No results found for: RESPCX  Pain Management Panel     Pain Management Panel Latest Ref Rng & Units 1/13/2014    AMPHETAMINES SCREEN, URINE NEGATIVE Negative    BARBITURATES SCREEN NEGATIVE Negative    BENZODIAZEPINE SCREEN, URINE NEGATIVE Negative    COCAINE SCREEN, URINE NEGATIVE Negative    METHADONE SCREEN, URINE NEGATIVE Negative        I have personally reviewed the above laboratory results.   ----------------------------------------------------------------------------------------------------------------------  Imaging Results (Last 24 Hours)     Procedure Component Value Units Date/Time    FL Video Swallow Single Contrast [[id number]] Collected: 09/14/20 1407     Updated: 09/14/20 1414    Narrative:      FL VIDEO SWALLOW SINGLE-CONTRAST-     CLINICAL INDICATION: aspiration; S72.001A-Fracture of unspecified part  of neck of right femur, initial encounter for closed fracture;  Z86.73-Personal history of transient ischemic attack (TIA), and cerebral  infarction without residual deficits        TECHNIQUE:   Speech therapy service was present. The patient was examined in the  sitting lateral position and was given several consistencies of barium.     FINDINGS: Aspiration with thin liquids     FLUOROSCOPY TIME: 0.6 minutes     Images: Cine loop was acquired       Impression:      Aspiration with thin liquids     For additional information please see the report provided by the speech  therapy service     This report was finalized on 9/14/2020 2:11 PM by Dr. Paul Aranda MD.       XR  Abdomen KUB [[id number]] Collected: 09/14/20 1236     Updated: 09/14/20 1257    Narrative:      EXAMINATION: XR ABDOMEN KUB-      CLINICAL INDICATION: distention and pain; S72.001A-Fracture of  unspecified part of neck of right femur, initial encounter for closed  fracture; Z86.73-Personal history of transient ischemic attack (TIA),  and cerebral infarction without residual deficits        COMPARISON: 11/12/2019     FINDINGS: One view of the abdomen shows interval placement of a right  hip prosthesis. Overlying skin clips are present.     Moderate to large volume stool in the colon.       Impression:      1. Postoperative right hip prosthesis.  2. Moderate to large volume stool.      This report was finalized on 9/14/2020 12:55 PM by Dr. Paul Aranda MD.             ----------------------------------------------------------------------------------------------------------------------  Assessment/Plan       · Femur fracture-status post right hip bipolar replacement 9/13.  DT/OT consulted.  Likely can return to nursing home soon  · For UTI-noted persistent leukocytosis.  We obtained with increased WBC therefore reflex to culture.  Started Rocephin for now.  · Constipation-KUB obtained due to abdominal distention and pain as mentioned above on exam.  Found to have moderate to large amount of stool with constipation.  Received Dulcolax suppository, large dose lactulose, MiraLAX, doc/senna without resolved.  Administering 1 bottle of magnesium citrate now.  · History of CVA with hemiparesis-limited mobility therefore causing complications with rehab.  · CAD-aspirin/Plavix at home  · Chronic systolic congestive heart failure-EF 36 to 40%, on Aldactone, Entresto at home.  Not requiring diuretics due to poor oral fluid intake.  Carvedilol added by cardiology  · Iron deficiency anemia  · EPH-Flomax and finasteride.  DC Bajwa catheter postoperatively  · Hyperlipidemia-continue atorvastatin  · GERD-continue home  PPI  · Insomnia-continue home nighttime hydroxyzine  · B12 deficiency-continue home monthly injections  · CKD3- stable    --------------------------------------------------  DVT Prophylaxis: Apixaban Ortho     Disposition: Possible return to nursing home tomorrow  --------------------------------------------------      Katayoun Behbahani, MD  Hospitalist Service -- [facility]     09/14/20  18:26 EDT

## 2020-09-14 NOTE — PLAN OF CARE
Goal Outcome Evaluation:  Plan of Care Reviewed With: patient  Progress: improving  Outcome Summary: Pt resting in bed. Pt expressed some pain in hip earlier this morning. See MAR. Pt voices no complaints at this moment. No s/s of acute distress at this time. Will continue to monitor.    Pt resting in bed this shift with no complaints. Pt has not complained of any pain this shift. Surgical dressing is clean, dry, and intact with no drainage noted. Pt possible D/C today per day shift RN. VSS. No S/S of acute distress noted. Will continue to monitor and follow plan of care.

## 2020-09-14 NOTE — PLAN OF CARE
Problem: Swallowing Impairment  Goal: Improved Swallowing Without Aspiration  Outcome: Ongoing, Progressing     Mr. Pastor participated in MBS w/ aspiration of thin liquids, recommendation to continue premorbid baseline least restrictive modified po diet of puree consistency, nectar thick liquids.

## 2020-09-14 NOTE — PLAN OF CARE
Consult received. Chart reviewed. Mr. Pastor is noted  w/ h/o oropharyngeal dysphagia w/ most recent [facility] OP MBS 12/17/19 w/ recommendation for modified po diet of puree consistency, nectar thick liquids. Per this status, he is felt to most benefit from instrumental MBS ot objectively evaluate swallowing fnx. SLP contacted SNF via telephone this am w/ report that Mr. Pastor accepts a premorbid baseline modified po diet of puree consistency, nectar thick liquids w/o overt s/s aspiration. Per this status, will modify diet to puree consistency, nectar thick liquids pending MBS results.

## 2020-09-14 NOTE — PROGRESS NOTES
Discharge Planning Assessment  [facility]     Patient Name: Demarcus Pastor  MRN: [id number]  Today's Date: 9/14/2020    Admit Date: 9/11/2020        Discharge Plan     Row Name 09/14/20 0851       Plan    Plan  Pt admitted on 9/11/20 from [facility].  Pt has a skilled bed reserved at [facility] for 14 days upon admit to [facility] per Dianne.  SS faxed pt updated information to [facility] through [facility] this am  SS will follow.        Continued Care and Services - Admitted Since 9/11/2020     Destination     Service Provider Request Status Selected Services Address Phone Fax    [facility]  Pending - Request Sent N/A [address] [address] [phone]             KINGSLEY HoW

## 2020-09-14 NOTE — PLAN OF CARE
Goal Outcome Evaluation:  Plan of Care Reviewed With: patient  Progress: no change  Pt resting in bed. Attempted to titrate pt to room air, but started to desat in the 80s.Swallow eval done per speech puree and nectar thick, assist to feed. Md made aware pt not had bm documented this admission, KUB and meds ordered. Garett d/c'd. Will cont to monitor

## 2020-09-14 NOTE — NURSING NOTE
Pt's sodium this AM was 128, whereas prior sodium level was 137. Tracee BARLOW aware. See orders. NS started @ 50 mL/hr. Sodium redraw in for this AM. Will continue to monitor.

## 2020-09-14 NOTE — MBS/VFSS/FEES
Acute Care - Speech Language Pathology   Swallow Initial Evaluation  [facility]   MODIFIED BARIUM SWALLOW STUDY     Patient Name: Demarcus Pastor  : 1933  MRN: [id number]  Today's Date: 2020  Onset of Illness/Injury or Date of Surgery: 20     Referring Physician: Dr. Araiza      Admit Date: 2020    Visit Dx:     ICD-10-CM ICD-9-CM   1. Closed fracture of neck of right femur, initial encounter (CMS/[facility])  S72.001A 820.8   2. History of CVA (cerebrovascular accident)  Z86.73 V12.54     Patient Active Problem List   Diagnosis   • Esophageal stricture   • Hypertension   • Chronic back pain   • Weight loss   • Dementia (CMS/HCC)   • Elevated LFTs   • Dysphagia   • Colitis   • Abnormal CT of the abdomen   • Malnutrition (CMS/HCC)   • Iron deficiency anemia   • Anemia due to vitamin B12 deficiency   • Hypokalemia   • Late onset 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 (CMS/HCC)   • Anemia   • Closed fracture of neck of right femur (CMS/HCC)     Past Medical History:   Diagnosis Date   • Abnormal CT of the abdomen 2016   • Alzheimer's disease with late onset (CMS/HCC)    • Anxiety    • Atelectasis    • Atherosclerotic heart disease    • Cardiomyopathy (CMS/HCC)    • Colitis 2016   • CVA (cerebral vascular accident) (CMS/HCC)    • Diverticulosis    • Dysphagia    • GERD (gastroesophageal reflux disease)    • Hemiplegia (CMS/HCC)    • History of transfusion    • Hyperlipidemia    • Hypertension    • Hypertension    • Hypokalemia    • Inguinal hernia    • Intracardiac thrombosis, not elsewhere classified    • Iron deficiency anemia    • Kidney stone    • Malnutrition (CMS/HCC)    • Muscle weakness    • Neuromuscular dysfunction of bladder    • Pancreatitis      Past Surgical History:   Procedure Laterality Date   • COLONOSCOPY N/A 2016    Procedure: COLONOSCOPY CPT CODE: 92163;  Surgeon: Lilliam Wilson DO;  Location: [facility];  Service:    • ENDOSCOPY N/A 2016     Procedure: ESOPHAGOGASTRODUODENOSCOPY;  Surgeon: Alex Robles III, MD;  Location:  [facility];  Service:    • ENDOSCOPY N/A 11/9/2016    Procedure: ESOPHAGOGASTRODUODENOSCOPY WITH BIOPSY CPT CODE: 73213;  Surgeon: Lilliam Wilson DO;  Location:  [facility];  Service:    • ESOPHAGEAL DILATATION       Demarcus Pastor  presents to the radiology suite this pm from 3S 3308/1S to participate in an instrumental MBS to evaluate safety/efficacy of swallowing fnx, determine safest/least restrictive diet. Mr. Pastor is noted  w/ h/o oropharyngeal dysphagia w/ most recent [facility] OP MBS 12/17/19 w/ recommendation for modified po diet of puree consistency, nectar thick liquids.       Social History     Socioeconomic History   • Marital status:      Spouse name: Not on file   • Number of children: Not on file   • Years of education: Not on file   • Highest education level: Not on file   Tobacco Use   • Smoking status: Never Smoker   • Smokeless tobacco: Never Used   Substance and Sexual Activity   • Alcohol use: No   • Drug use: No   • Sexual activity: Defer      No recent chest xray available for review.     Diet Orders (active) (From admission, onward)     Start     Ordered    09/14/20 1008  Diet Pureed; [place] / Syrup Thick  Diet Effective Now      09/14/20 1008              He is observed on 2L O2 via NC w/o complications.     Risks and benefits of the procedure are explained w/ pt cooperative to participate. Proceed per protocol.     Pt is positioned upright and centered in a soft strap supportive chair to accept multiple po presentations of crumbled cracker, puree, honey thick, nectar thick, and thin liquids via spoon, cup and straw. Whole placebo pill in puree deferred 2/2 observed dysphagia. He does not self feed during this evaluation.     All views are from the lateral plane.     Facial/oral structures are symmetrical upon observation w/o lingual deviation upon protrusion. Oral mucosa are moist, pink and clean.  Secretions are clear, thin and controlled. OROM/LOUIE is generally weak to imitate oral postures. Gag is not assessed. Volitional cough is adequate in intensity, clear in quality, nonproductive. Vocal quality is adequate in intensity, clear in quality w/ intelligible speech but limited verbalizations, primarily y/n responses. Pt is a/a and cooperative to particpate. Pt  is oriented to person, follows simple directives.     Upon po presentations, adequate bolus anticipation w/ good labial seal for bolus clearance via spoon bowl, cup rim stability and suction via straw. Intermittent mild R anterior loss w/ liquids, primarily w/ cup presentations. Bolus formation, manipulation, and control are moderately weak w/ increased oral prep time w/ crumbled cracker, mixed phasic/rotary mastication pattern. Lingual pumping evidenced w/ puree and honey thick liquids. A-p transit is moderately delayed w/o significant oral residue. Tongue base retraction and linguavelar seal are generally weak w/ premature spillage of nectar thick and thin liquids to the bilateral pyriform sinuses. No laryngeal penetration or aspiration is evidenced before the swallow.     Pharyngeal swallow is delayed w/ mildly weak hyolaryngeal elevation and delayed epiglottic inversion. Laryngeal penetration occurs during the swallow w/ thin liquids via all presenation styles resulting in aspiration of thin liquid via cup, silent aspiration w/ thin liquid via straw. Elicited and cued cough is effective to diminish but not completely clear aspirated material. Compensatory techniques deferred 2/2 concerns for carryover. Pharyngeal contraction is slightly weak w/ mild vallecular residue w/ all consistencies. No other laryngeal penetration or aspiration evidenced during or after the swallow.     Partial esophageal sweep reveals no obvious mucosal abnormalities or retrograde flow to the upper 1/3.      Impression: Per this evaluation, Mr. Pastor presents w/ a moderate  oral, mild-moderate pharyngeal dysphagia w/ aspiration of thin liquids, intermittently silent. He will most benefit from continuing premorbid baseline modified po diet w/ nectar thick liquids, 1:1 assistance w/ all po intake.      EDUCATION  The patient has been educated in the following areas:   Dysphagia (Swallowing Impairment) Modified Diet Instruction.    SLP Recommendation and Plan  1. Puree consistency, NECTAR thick liquids.   2. Meds crushed in puree.   3. Universal aspiration precautions.   4. BRISA precautions.   5. Oral care protocol.   6. 1:1 feeding assistance.   SLP to f/u for diet safety/acceptance.     D/w pt results and recommendations w/ verbal understanding and agreement, question understanding/retention 2/2 ams.     D/w RN via telephone results and recommendations w/ verbal understanding and agreement.     Thank you for allowing me to participate in the care of your patient-  Abida Robles M.A., CCC-SLP       Time Calculation:   Time Calculation- SLP     Row Name 09/14/20 1355             Time Calculation- SLP    SLP Received On  09/13/20  -ISAIAS      SLP - Next Appointment  09/15/20  -        User Key  (r) = Recorded By, (t) = Taken By, (c) = Cosigned By    Initials Name Provider Type    Abida Fermin MA,CCC-SLP Speech and Language Pathologist        Therapy Charges for Today     Code Description Service Date Service Provider Modifiers Qty    [id number] HC ST MOTION FLUORO EVAL SWALLOW 8 9/14/2020 Abida Robles MA,CCC-SLP GN 1        Abida Robles MA,CCC-SLP  9/14/2020

## 2020-09-14 NOTE — PROGRESS NOTES
Discharge Planning Assessment   [facility]     Patient Name: Demarcus Pastor  MRN: [id number]  Today's Date: 9/14/2020    Admit Date: 9/11/2020    Discharge Needs Assessment    No documentation.       Discharge Plan     Row Name 09/14/20 1518       Plan    Plan SS spoke with [facility] per Taryn who states NH prefers a bowel movement and another COV-Id test prior to admission. SS notified physician. SS will follow.        Crystal Gallegos

## 2020-09-14 NOTE — THERAPY TREATMENT NOTE
Acute Care - Physical Therapy Treatment Note   Drew     Patient Name: Demarcus Pastor  : 1933  MRN: [id number]  Today's Date: 2020   Onset of Illness/Injury or Date of Surgery: 20       PT Assessment (last 12 hours)      PT Evaluation and Treatment     Row Name 20 145          Physical Therapy Time and Intention    Subjective Information  complains of;pain  -AG     Document Type  therapy note (daily note)  -AG     Mode of Treatment  individual therapy;physical therapy  -AG     Patient Effort  poor  -AG     Row Name 20 1450          Pain    Additional Documentation  Pain Scale: FACES Pre/Post-Treatment (Group)  -AG     Row Name 20 145          Pain Scale: FACES Pre/Post-Treatment    Pain: FACES Scale, Pretreatment  2-->hurts little bit  -AG     Posttreatment Pain Rating  6-->hurts even more  -AG     Pain Location - Side  Right  -AG     Pain Location  hip  -AG     Row Name 20 145          Bed Mobility    Bed Mobility  rolling left;rolling right;scooting/bridging  -AG     Rolling Left [place] (Bed Mobility)  dependent (less than 25% patient effort)  -AG     Rolling Right [place] (Bed Mobility)  dependent (less than 25% patient effort)  -AG     Scooting/Bridging [place] (Bed Mobility)  dependent (less than 25% patient effort)  -AG     Row Name 20 1450          Transfers    Comment (Transfers)  deferred d/t dependent status  -AG     Row Name 20 145          Gait/Stairs (Locomotion)    [place] Level (Gait)  unable to assess  -AG     Row Name 20 1450          Safety Issues, Functional Mobility    Safety Issues Affecting Function (Mobility)  insight into deficits/self-awareness;safety precaution awareness;safety precautions follow-through/compliance  -AG     Impairments Affecting Function (Mobility)  range of motion (ROM);strength  -AG     Row Name 20 145          Balance    Static Standing Balance  unable to perform activity  -AG      Row Name 09/14/20 1450          Motor Skills    Therapeutic Exercise  hip;knee;ankle  -     Row Name 09/14/20 1450          Hip (Therapeutic Exercise)    Hip (Therapeutic Exercise)  AAROM (active assistive range of motion);PROM (passive range of motion)  -     Hip AAROM (Therapeutic Exercise)  right;flexion;extension;aBduction;aDduction;supine;10 repetitions;2 sets  -     Row Name 09/14/20 1450          Knee (Therapeutic Exercise)    Knee (Therapeutic Exercise)  AROM (active range of motion);AAROM (active assistive range of motion)  -     Knee AROM (Therapeutic Exercise)  right;flexion;extension;supine;10 repetitions;2 sets  -     Row Name 09/14/20 1450          Ankle (Therapeutic Exercise)    Ankle (Therapeutic Exercise)  AROM (active range of motion)  -     Ankle AROM (Therapeutic Exercise)  dorsiflexion;plantarflexion;supine;10 repetitions;2 sets  -     Row Name             Wound 09/12/20 0958 Right hip Incision    Wound - Properties Group Placement Date: 09/12/20  - Placement Time: 0958 - Present on Hospital Admission: N  -[facility] Side: Right  - Location: hip  -[facility] Primary Wound Type: Incision  -[facility], s/p bipolar hip replacement  Additional Comments: staples,adaptic, 4x4s, abd pad, tape- drsg is c/d/i  -    Retired Wound - Properties Group Date first assessed: 09/12/20  - Time first assessed: 0958  - Present on Hospital Admission: N  -[facility] Side: Right  - Location: hip  -[facility] Primary Wound Type: Incision  -[facility], s/p bipolar hip replacement  Additional Comments: staples,adaptic, 4x4s, abd pad, tape- drsg is c/d/i  -    Row Name 09/14/20 1450          Coping    Observed Emotional State  anxious;cooperative  -     Verbalized Emotional State  acceptance  -     Row Name 09/14/20 1450          Plan of Care Review    Plan of Care Reviewed With  patient  -AG     Progress  no change  -     Outcome Summary  pt. experiencing intense pain with R LE ROM.   -     Row Name 09/14/20 1450           Positioning and Restraints    Pre-Treatment Position  in bed  -AG     Post Treatment Position  bed  -AG     In Bed  supine;call light within reach;encouraged to call for assist;side rails up x3;ABD pillow  -AG     Row Name 09/14/20 1450          Progress Summary (PT)    Progress Toward Functional Goals (PT)  unable to show any progress toward functional goals  -AG     Barriers to Overall Progress (PT)  pain  -AG     Row Name 09/14/20 1450          Therapy Plan Review/Discharge Plan (PT)    Therapy Plan Review (PT)  care plan/treatment goals reviewed;risks/benefits reviewed;participants included;patient  -AG       User Key  (r) = Recorded By, (t) = Taken By, (c) = Cosigned By    Initials Name Provider Type    AG Crystal Crodero, PT Physical Therapist    Mariola Reno, RN Registered Nurse        Physical Therapy Education                 Title: PT OT SLP Therapies (In Progress)     Topic: Physical Therapy (In Progress)     Point: Mobility training (In Progress)     Learning Progress Summary           Patient Acceptance, E,D, NR by  at 9/14/2020 1449    Comment: reviewed SUSIE precautions and importance of mobilization.    Acceptance, E, VU by HN at 9/13/2020 1139    Acceptance, E,D, VU,NR by  at 9/12/2020 1431    Comment: instructed in purpose and intention of PT, importance of mobilization; instructed in hip precautions.                   Point: Home exercise program (In Progress)     Learning Progress Summary           Patient Acceptance, E,D, NR by  at 9/14/2020 1449    Comment: reviewed SUSIE precautions and importance of mobilization.    Acceptance, E, VU by HN at 9/13/2020 1139    Acceptance, E,D, VU,NR by  at 9/12/2020 1431    Comment: instructed in purpose and intention of PT, importance of mobilization; instructed in hip precautions.                   Point: Body mechanics (In Progress)     Learning Progress Summary           Patient Acceptance, E,D, NR by  at 9/14/2020 1449    Comment:  reviewed SUSIE precautions and importance of mobilization.    Acceptance, E, VU by HN at 9/13/2020 1139    Acceptance, E,D, VU,NR by  at 9/12/2020 1431    Comment: instructed in purpose and intention of PT, importance of mobilization; instructed in hip precautions.                   Point: Precautions (In Progress)     Learning Progress Summary           Patient Acceptance, E,D, NR by AG at 9/14/2020 1449    Comment: reviewed SUSIE precautions and importance of mobilization.    Acceptance, E, VU by  at 9/13/2020 1139    Acceptance, E,D, VU,NR by  at 9/12/2020 1431    Comment: instructed in purpose and intention of PT, importance of mobilization; instructed in hip precautions.                               User Key     Initials Effective Dates Name Provider Type Discipline     04/03/18 -  Crystal Cordero, PT Physical Therapist PT     01/30/20 -  Evelyn Santiago RN Registered Nurse Nurse              PT Recommendation and Plan  Anticipated Discharge Disposition (PT): skilled nursing facility  Planned Therapy Interventions (PT): balance training, bed mobility training, gait training, patient/family education, postural re-education, ROM (range of motion), strengthening, transfer training(gait training if appropriate)  Therapy Frequency (PT): other (see comments)(6 days/ week; 1-2 times/ day)  Progress Summary (PT)  Progress Toward Functional Goals (PT): unable to show any progress toward functional goals  Barriers to Overall Progress (PT): pain  Plan of Care Reviewed With: patient  Progress: no change  Outcome Summary: pt. experiencing intense pain with R LE ROM.        Time Calculation:   PT Charges     Row Name 09/14/20 7090             Time Calculation    PT Received On  09/14/20  -         Time Calculation- PT    TCU Minutes- PT  15 min  -        User Key  (r) = Recorded By, (t) = Taken By, (c) = Cosigned By    Initials Name Provider Type     Crystal Cordero, PT Physical Therapist        Therapy Charges  for Today     Code Description Service Date Service Provider Modifiers Qty    [id number] HC PT THER PROC EA 15 MIN 9/14/2020 Crystal Cordero, PT GP 1               Crystal Cordero, PT  9/14/2020

## 2020-09-14 NOTE — DISCHARGE PLACEMENT REQUEST
"Laurel Galvez (87 y.o. Male)     Date of Birth Social Security Number Address Home Phone MRN    05/11/1933  [address]  [place] [id number] [phone] [id number]    Religious Marital Status          Amish        Admission Date Admission Type Admitting Provider Attending Provider Department, Room/Bed    9/11/20 Emergency Likens, Dwayne, DO Behbahani, Katayoun, MD [address], 3308/1S    Discharge Date Discharge Disposition Discharge Destination         Home or Self Care              Attending Provider: Behbahani, Katayoun, MD    Allergies: Penicillins    Isolation: None   Infection: None   Code Status: CPR    Ht: 182.9 cm (72\")   Wt: 62.7 kg (138 lb 5 oz)    Admission Cmt: None   Principal Problem: Closed fracture of neck of right femur (CMS/[facility]) [S72.001A] More...                 Active Insurance as of 9/11/2020     Primary Coverage     Payor Plan Insurance Group Employer/Plan Group    MEDICARE MEDICARE A & B      Payor Plan Address Payor Plan Phone Number Payor Plan Fax Number Effective Dates    [address] [phone]  5/1/1998 - None Entered    [facility] [id number]       Subscriber Name Subscriber Birth Date Member ID       LAUREL GALVEZ 5/11/1933 [id number]           Secondary Coverage     Payor Plan Insurance Group Employer/Plan Group    [place]      Payor Plan Address Payor Plan Phone Number Payor Plan Fax Number Effective Dates    [address] [phone]  12/13/2019 - None Entered    [facility] [id number]       Subscriber Name Subscriber Birth Date Member ID       LAUREL GALVEZ 5/11/1933 [id number]                 Emergency Contacts      (Rel.) Home Phone Work Phone Mobile Phone    Dawit (POA)Vj (Son) [phone] -- [phone]    KEISHA GALVEZ (Relative) -- -- [phone]            Emergency Contact Information     Name Relation Home Work Mobile    Dawit (POJOHANN)Vj Son [phone]    KEISHA GALVEZ Relative   " [phone]          Insurance Information                MEDICARE/MEDICARE A & B Phone: [phone]    Subscriber: Demarcus Pastor Subscriber#: [id number]    Group#:  Precert#:         [place] Phone: [phone]    Subscriber: Demarcus Pastor Subscriber#: [id number]    Group#:  Precert#:           Treatment Team     Provider Relationship Specialty Contact    Behbahani, Katayoun, MD Attending --  [phone]    Vladimir Liao, RRT Respiratory Therapist --  [phone]    Gustavo Araiza MD Consulting Physician, Surgeon Orthopedic Surgery  [phone]    Nick Cedeno, RN Registered Nurse --     Crystal Cordero, PT Physical Therapist Physical Therapy     Clemente Gabriel DO Consulting Physician, Physician of Record Hospitalist  [phone]    Dahlia Vaca Patient Care Technician --     Zunilda Greenberg PCT Patient Care Technician --     Linda Mclaughlin RN Registered Nurse --     Lien Booker Technician --     Ching Handley RN Registered Nurse --           Problem List           Codes Noted - Resolved       Hospital    * (Principal) Closed fracture of neck of right femur (CMS/HCC) ICD-10-CM: S72.001A  ICD-9-CM: 820.8 9/11/2020 - Present       Non-Hospital    Anemia ICD-10-CM: D64.9  ICD-9-CM: 285.9 11/10/2016 - Present    Iron deficiency anemia ICD-10-CM: D50.9  ICD-9-CM: 280.9 11/8/2016 - Present    Anemia due to vitamin B12 deficiency ICD-10-CM: D51.9  ICD-9-CM: 281.1 11/8/2016 - Present    Hypokalemia ICD-10-CM: E87.6  ICD-9-CM: 276.8 11/8/2016 - Present    Late onset 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 (CMS/HCC) ICD-10-CM: G30.1, F02.80  ICD-9-CM: 331.0, 294.10 11/8/2016 - Present    Malnutrition (CMS/HCC) ICD-10-CM: E46  ICD-9-CM: 263.9 6/23/2016 - Present    Colitis ICD-10-CM: K52.9  ICD-9-CM: 558.9 6/19/2016 - Present    Abnormal CT of the abdomen ICD-10-CM: R93.5  ICD-9-CM: 793.6 6/19/2016 - Present    Elevated LFTs  "ICD-10-CM: R79.89  ICD-9-CM: 790.6 6/16/2016 - Present    Dysphagia ICD-10-CM: R13.10  ICD-9-CM: 787.20 6/16/2016 - Present    Esophageal stricture ICD-10-CM: K22.2  ICD-9-CM: 530.3 6/15/2016 - Present    Hypertension ICD-10-CM: I10  ICD-9-CM: 401.9 6/15/2016 - Present    Chronic back pain ICD-10-CM: M54.9, G89.29  ICD-9-CM: 724.5, 338.29 6/15/2016 - Present    Weight loss ICD-10-CM: R63.4  ICD-9-CM: 783.21 6/15/2016 - Present    Dementia (CMS/HCC) ICD-10-CM: F03.90  ICD-9-CM: 294.20 6/15/2016 - Present             History & Physical      Clemente Gabriel DO at 09/11/20 [facility]              [facility]  HISTORY AND PHYSICAL    Primary Care Physician: Umm Butt MD    Subjective     Chief Complaint:    Chief Complaint   Patient presents with   • Leg Pain       History of Present Illness:   All of the HPI comes from notes in the chart, RN in ER, MD in ER, patient cannot provide any meaningful subjective data secondary to his inability to speak with words other than \"yes\" or \"no\".  These are also unreliable answers as well.  History of CVA, this expressive aphasia is likely a sequelae of the event.      Mr Pastor is an 87 year old nursing home resident who had been complaining of leg pain on the right side.  His PMH includes CVA, HLD, HTN, CHF, BPH, CAD, GERD who comes in with expressive aphasia and right leg pain.  Venous doppler in ER was negative for DVT.  Labs revealed an elevated total .   Xray right hip shows   IMPRESSION:  Complete transverse fracture through the femoral neck  Dr Araiza was notified via [facility] Xavier, she saw patient in ER  Plans are for surgery tomorrow if medically cleared.       Review of Systems   Patient cannot provide reliable ROS 2/2 sequelae suffered after a stroke, namely  Expressive aphasia    1. Constitutional: Negative for fever. Negative for chills, diaphoresis, fatigue and unexpected weight change.   2. HENT: Negative for " congestion and hearing loss.   3. Eyes: Negative for redness and visual disturbance.   4. Respiratory: negative for shortness of breath. Negative for chest pain . Negative for cough and chest tightness.   5. Cardiovascular: Negative for chest pain and palpitations.   6. Gastrointestinal: Negative for abdominal distention, abdominal pain and blood in stool.   7. Endocrine: Negative for cold intolerance and heat intolerance.   8. Genitourinary: Negative for difficulty urinating, dysuria and frequency.   9. Musculoskeletal: + for right leg pain.   10. Skin: Negative for color change, rash and wound.   11. Neurological: Negative for syncope, weakness and headaches. + expressive aphasia  12. Hematological: Negative for adenopathy. Does not bruise/bleed easily.   13. Psychiatric/Behavioral: Negative for confusion. The patient is not nervous/anxious.     Past Medical History:   Past Medical History:   Diagnosis Date   • Abnormal CT of the abdomen 6/19/2016   • Alzheimer's disease with late onset (CMS/HCC)    • Anxiety    • Atelectasis    • Atherosclerotic heart disease    • Cardiomyopathy (CMS/HCC)    • Colitis 6/19/2016   • CVA (cerebral vascular accident) (CMS/HCC)    • Diverticulosis    • Dysphagia    • GERD (gastroesophageal reflux disease)    • Hemiplegia (CMS/HCC)    • History of transfusion    • Hyperlipidemia    • Hypertension    • Hypertension    • Hypokalemia    • Inguinal hernia    • Intracardiac thrombosis, not elsewhere classified    • Iron deficiency anemia    • Kidney stone    • Malnutrition (CMS/HCC)    • Muscle weakness    • Neuromuscular dysfunction of bladder    • Pancreatitis        Past Surgical History:  Past Surgical History:   Procedure Laterality Date   • COLONOSCOPY N/A 11/9/2016    Procedure: COLONOSCOPY CPT CODE: 71958;  Surgeon: Lilliam Wilson DO;  Location: [facility];  Service:    • ENDOSCOPY N/A 6/17/2016    Procedure: ESOPHAGOGASTRODUODENOSCOPY;  Surgeon: Alex Robles III, MD;   "Location: [facility] OR;  Service:    • ENDOSCOPY N/A 11/9/2016    Procedure: ESOPHAGOGASTRODUODENOSCOPY WITH BIOPSY CPT CODE: 27463;  Surgeon: Lilliam Wilson DO;  Location: [facility];  Service:    • ESOPHAGEAL DILATATION         Family History: family history includes Heart disease in his brother; Stroke in his mother.    Social History:  reports that he has never smoked. He has never used smokeless tobacco. He reports that he does not drink alcohol or use drugs.    Medications:    (Not in a hospital admission)    Allergies:  Allergies   Allergen Reactions   • Penicillins Unknown - Low Severity         Objective     Physical Exam:  Vital Signs: /87   Pulse 60   Temp 98.7 °F (37.1 °C) (Oral)   Resp 16   Ht 182.9 cm (72\")   Wt 61.2 kg (135 lb)   SpO2 94%   BMI 18.31 kg/m²      General Appearance: alert, no acute distress. Basically nonverbal, can say yes or no, cannot rely upon validity  Skin: warm and dry.  HEENT: pupils round and reactive to light, oral mucosa normal.  Neck: supple, no JVD, trachea midline.  Lungs: CTA, unlabored breathing effort.  Heart: RRR, normal S1 and S2, no S3, no rub.  Abdomen: soft, non-tender, no palpable bladder, present bowel sounds to auscultation.  Extremities: no clubbing, cyanosis or edema.  Neuro: says yes or no only, expressive aphasia.          Results Reviewed:    Lab Results (last 24 hours)     Procedure Component Value Units Date/Time    COVID PRE-OP / PRE-PROCEDURE SCREENING ORDER (NO ISOLATION) - Swab, Nasal Cavity [[id number]] Collected:  09/11/20 1056    Specimen:  Swab from Nasal Cavity Updated:  09/11/20 1122    Narrative:       The following orders were created for panel order COVID PRE-OP / PRE-PROCEDURE SCREENING ORDER (NO ISOLATION) - Swab, Nasal Cavity.  Procedure                               Abnormality         Status                     ---------                               -----------         ------                     COVID-[facility] " IN-HOUS...[[id number]]  Normal              Final result                 Please view results for these tests on the individual orders.    COVID-19, [facility]BBOTT IN-HOUSE,NP Swab (NO TRANSPORT MEDIA) 2 HR TAT - Swab, Nasal Cavity [[id number]]  (Normal) Collected:  09/11/20 1056    Specimen:  Swab from Nasal Cavity Updated:  09/11/20 1122     COVID19 Not Detected    Narrative:       Fact sheet for providers: https://www.fda.gov/media/308855/download     Fact sheet for patients: https://www.fda.gov/media/142269/download    Comprehensive Metabolic Panel [[id number]]  (Abnormal) Collected:  09/11/20 0609    Specimen:  Blood from Arm, Left Updated:  09/11/20 0636     Glucose 109 mg/dL      BUN 20 mg/dL      Creatinine 1.56 mg/dL      Sodium 136 mmol/L      Potassium 4.2 mmol/L      Chloride 103 mmol/L      CO2 24.7 mmol/L      Calcium 8.7 mg/dL      Total Protein 7.1 g/dL      Albumin 3.76 g/dL      ALT (SGPT) 16 U/L      AST (SGOT) 27 U/L      Alkaline Phosphatase 43 U/L      Total Bilirubin 1.3 mg/dL      eGFR Non African Amer 42 mL/min/1.73      Globulin 3.3 gm/dL      A/G Ratio 1.1 g/dL      BUN/Creatinine Ratio 12.8     Anion Gap 8.3 mmol/L     Narrative:       GFR Normal >60  Chronic Kidney Disease <60  Kidney Failure <15      CK [[id number]]  (Abnormal) Collected:  09/11/20 0609    Specimen:  Blood from Arm, Left Updated:  09/11/20 0636     Creatine Kinase 799 U/L     CBC & Differential [[id number]] Collected:  09/11/20 0609    Specimen:  Blood from Arm, Left Updated:  09/11/20 0616    Narrative:       The following orders were created for panel order CBC & Differential.  Procedure                               Abnormality         Status                     ---------                               -----------         ------                     CBC Auto Differential[[id number]]        Abnormal            Final result                 Please view results for these tests on the individual orders.    CBC Auto Differential  [[id number]]  (Abnormal) Collected:  09/11/20 0609    Specimen:  Blood from Arm, Left Updated:  09/11/20 0616     WBC 11.55 10*3/mm3      RBC 4.84 10*6/mm3      Hemoglobin 14.7 g/dL      Hematocrit 45.0 %      MCV 93.0 fL      MCH 30.4 pg      MCHC 32.7 g/dL      RDW 15.6 %      RDW-SD 53.5 fl      MPV 10.9 fL      Platelets 214 10*3/mm3      Neutrophil % 66.1 %      Lymphocyte % 19.4 %      Monocyte % 11.3 %      Eosinophil % 1.9 %      Basophil % 0.5 %      Immature Grans % 0.8 %      Neutrophils, Absolute 7.63 10*3/mm3      Lymphocytes, Absolute 2.24 10*3/mm3      Monocytes, Absolute 1.31 10*3/mm3      Eosinophils, Absolute 0.22 10*3/mm3      Basophils, Absolute 0.06 10*3/mm3      Immature Grans, Absolute 0.09 10*3/mm3      nRBC 0.3 /100 WBC           I have personally reviewed and interpreted available lab data, radiology studies and ECG obtained at time of admission.     Assessment / Plan     Assessment/Problem List:     Closed fracture of neck of right femur (CMS/[facility])          Plan:    Right femur fracture  Evaluated by ortho  Plans for surgery tomorrow if medically cleared by our team  Labs, EKG, imaging, ECHO in place    History of CVA  Continue statin  Neuro checks q 8 hrs  EKG reviewed for cardiogenic arrhthymias that could complicate precipitate stroke  ECHO ordered since EF reduced last year  11/2019 ECHO below  · The study is technically difficult for diagnosis.  · The quality of the study is limited due to poor acoustic windows related to limited views obtained(only apical)  · The left ventricular cavity is mildly dilated.  · The following left ventricular wall segments are akinetic: apical anterior, apical lateral, apical inferior, apical septal and apex.  · Left ventricular systolic function is moderately decreased  · Estimated EF appears to be in the range of 36 - 40%  · Left ventricular diastolic dysfunction (grade I) consistent with impaired relaxation.  · The aortic valve is not well visualized.  The aortic valve is grossly normal in structure. No aortic valve regurgitation is present. No aortic valve stenosis is present.  · Mitral valve is not well visualized. The mitral valve is grossly normal in structure. Mild mitral valve regurgitation is present. No significant mitral valve stenosis is present.  · There is no evidence of pericardial effusion.      CAD  Continue BB, statin, asa after surgery, Entresto   Would appreciate CARDS cardiac clearance with this patient    Cardiac diet until midnight, then NPO      Clemente Gabriel DO 09/11/20 11:42    Please note that portions of this note may have been completed with a voice recognition program. Efforts were made to edit the dictations, but occasionally words are mistranscribed.    Electronically signed by Clemente Gabriel DO at 09/11/20 2757       Vital Signs (last day)     Date/Time   Temp   Temp src   Pulse   Resp   BP   Patient Position   SpO2    09/14/20 0613   99.1 (37.3)   --   66   18   117/66   Lying   94    09/14/20 0300   97.7 (36.5)   Axillary   61   20   119/63   Lying   98    09/13/20 1829   98.6 (37)   Axillary   62   18   102/54   Lying   97    09/13/20 1744   --   --   65   --   109/55   Lying   --    09/13/20 1516   98.5 (36.9)   Axillary   67   18   98/56   Lying   91    09/13/20 1014   99 (37.2)   Oral   71   16   106/54   Lying   93    09/13/20 0840   --   --   --   --   --   --   98    09/13/20 0643   98.9 (37.2)   Axillary   78   18   113/67   Lying   97    09/13/20 0324   97.7 (36.5)   Oral   80   16   132/77   Lying   97              Lines, Drains & Airways    Active LDAs     Name:   Placement date:   Placement time:   Site:   Days:    Peripheral IV 09/11/20 0925 Left Antecubital   09/11/20 0925    Antecubital   2    Urethral Catheter 16 Fr.   09/12/20 given in report by dayshift RN, cath was placed in surgery    1900     1                  Current Facility-Administered Medications   Medication Dose Route Frequency Provider Last Rate  Last Dose   • acetaminophen (TYLENOL) tablet 650 mg  650 mg Oral Q4H PRN Gustavo Araiza MD        Or   • acetaminophen (TYLENOL) 160 MG/5ML solution 650 mg  650 mg Oral Q4H PRN Gustavo Araiza MD        Or   • acetaminophen (TYLENOL) suppository 650 mg  650 mg Rectal Q4H PRN Gustavo Araiza MD       • acetaminophen (TYLENOL) tablet 650 mg  650 mg Oral Q4H PRN Gustavo Araiza MD        Or   • acetaminophen (TYLENOL) suppository 650 mg  650 mg Rectal Q4H PRN Gustavo Araiza MD       • acetaminophen (TYLENOL) tablet 500 mg  500 mg Oral Q6H PRN Gustavo Araiza MD       • apixaban (ELIQUIS) tablet 2.5 mg  2.5 mg Oral Q12H Gustavo Araiza MD   2.5 mg at 09/14/20 0826   • atorvastatin (LIPITOR) tablet 40 mg  40 mg Oral Daily Gustavo Araiza MD   40 mg at 09/14/20 0826   • carvedilol (COREG) tablet 6.25 mg  6.25 mg Oral BID With Meals SubJj rhodes MD   6.25 mg at 09/14/20 0825   • cholecalciferol (VITAMIN D3) capsule 50,000 Units  50,000 Units Oral Weekly Gustavo Araiza MD       • clopidogrel (PLAVIX) tablet 75 mg  75 mg Oral Daily SubramanigordonmJj MD   75 mg at 09/14/20 0826   • [START ON 9/16/2020] cyanocobalamin injection 1,000 mcg  1,000 mcg Intramuscular Q30 Days Gustavo Araiza MD       • finasteride (PROSCAR) tablet 5 mg  5 mg Oral Daily Gustavo Araiza MD   5 mg at 09/14/20 0826   • HYDROcodone-acetaminophen (NORCO) 5-325 MG per tablet 1 tablet  1 tablet Oral Q4H PRN Gustavo Araiza MD   1 tablet at 09/13/20 0810   • HYDROcodone-acetaminophen (NORCO) 7.5-325 MG per tablet 2 tablet  2 tablet Oral Q4H PRN Gustavo Araiza MD       • hydrOXYzine (ATARAX) tablet 25 mg  25 mg Oral Nightly Gustavo Araiza MD   25 mg at 09/13/20 2055   • loperamide (IMODIUM) capsule 2 mg  2 mg Oral 4x Daily PRN Gustavo Araiza MD       • morphine injection 1 mg  1 mg Intravenous Q2H PRN Gustavo Araiza MD        And   • naloxone (NARCAN) injection 0.4 mg  0.4 mg  Intravenous Q5 Min PRN Gustavo Araiza MD       • morphine injection 2 mg  2 mg Intravenous Q2H PRN Gustavo Araiza MD        And   • naloxone (NARCAN) injection 0.4 mg  0.4 mg Intravenous Q5 Min PRN Gustavo Araiza MD       • ondansetron (ZOFRAN) tablet 4 mg  4 mg Oral Q6H PRN Gustavo Araiza MD        Or   • ondansetron (ZOFRAN) injection 4 mg  4 mg Intravenous Q6H PRN Gustavo Araiza MD       • ondansetron (ZOFRAN) tablet 4 mg  4 mg Oral Q8H PRN Gustavo Araiza MD       • pantoprazole (PROTONIX) EC tablet 40 mg  40 mg Oral Nightly Gustavo Araiza MD   40 mg at 09/13/20 2055   • sacubitril-valsartan (ENTRESTO)  MG tablet 1 tablet  1 tablet Oral BID Gustavo Araiza MD   1 tablet at 09/14/20 0826   • sodium chloride 0.9 % flush 10 mL  10 mL Intravenous Q12H Gustavo Araiza MD   10 mL at 09/14/20 0826   • sodium chloride 0.9 % flush 10 mL  10 mL Intravenous PRN Gustavo Araiza MD       • sodium chloride 0.9 % infusion  50 mL/hr Intravenous Continuous Ivonne Blair PA-C 50 mL/hr at 09/14/20 0507 50 mL/hr at 09/14/20 0507   • spironolactone (ALDACTONE) tablet 25 mg  25 mg Oral Daily Gustavo Araiza MD   25 mg at 09/14/20 0825   • tamsulosin (FLOMAX) 24 hr capsule 0.4 mg  0.4 mg Oral Daily Gustavo Araiza MD   0.4 mg at 09/14/20 0825   • thiamine (VITAMIN B-1) tablet 100 mg  100 mg Oral Daily Gustavo Araiza MD   100 mg at 09/14/20 0825       Lab Results (last 24 hours)     Procedure Component Value Units Date/Time    CBC & Differential [[id number]]  (Abnormal) Collected: 09/14/20 0249    Specimen: Blood Updated: 09/14/20 0438    Narrative:      The following orders were created for panel order CBC & Differential.  Procedure                               Abnormality         Status                     ---------                               -----------         ------                     CBC Auto Differential[[id number]]        Abnormal            Final result                  Please view results for these tests on the individual orders.    CBC Auto Differential [[id number]]  (Abnormal) Collected: 09/14/20 0249    Specimen: Blood Updated: 09/14/20 0431     WBC 12.20 10*3/mm3      RBC 3.79 10*6/mm3      Hemoglobin 11.5 g/dL      Hematocrit 36.5 %      MCV 96.3 fL      MCH 30.3 pg      MCHC 31.5 g/dL      RDW 15.8 %      RDW-SD 55.8 fl      MPV 10.4 fL      Platelets 225 10*3/mm3      Neutrophil % 64.4 %      Lymphocyte % 19.6 %      Monocyte % 12.6 %      Eosinophil % 2.1 %      Basophil % 0.6 %      Immature Grans % 0.7 %      Neutrophils, Absolute 7.85 10*3/mm3      Lymphocytes, Absolute 2.39 10*3/mm3      Monocytes, Absolute 1.54 10*3/mm3      Eosinophils, Absolute 0.26 10*3/mm3      Basophils, Absolute 0.07 10*3/mm3      Immature Grans, Absolute 0.09 10*3/mm3      nRBC 0.2 /100 WBC     Basic Metabolic Panel [[id number]]  (Abnormal) Collected: 09/14/20 0249    Specimen: Blood Updated: 09/14/20 0427     Glucose 111 mg/dL      BUN 27 mg/dL      Creatinine 1.19 mg/dL      Sodium 128 mmol/L      Potassium 4.0 mmol/L      Comment: Slight hemolysis detected by analyzer. Results may be affected.        Chloride 96 mmol/L      CO2 20.3 mmol/L      Calcium 7.5 mg/dL      eGFR Non African Amer 58 mL/min/1.73      BUN/Creatinine Ratio 22.7     Anion Gap 11.7 mmol/L     Narrative:      GFR Normal >60  Chronic Kidney Disease <60  Kidney Failure <15          Orders (last 24 hrs)      Start     Ordered    09/16/20 0900  cyanocobalamin injection 1,000 mcg  Every 30 Days      09/11/20 1733    09/14/20 1000  Sodium  Once      09/14/20 0452    09/14/20 0600  CBC Auto Differential  PROCEDURE ONCE      09/14/20 0002    09/14/20 0545  sodium chloride 0.9 % infusion  Continuous      09/14/20 0449    09/13/20 1600  Vital Signs  Every 8 Hours      09/12/20 1205    09/13/20 1600  Neurovascular Checks  Every 8 Hours      09/12/20 1205    09/13/20 1204  Case Management  Consult  Once     Provider:   (Not yet assigned)    09/13/20 1203    09/13/20 1147  Administer Oral Pain Medication Prior to Discharge  Once     Comments: For the ambulance ride please and thank you    09/13/20 1148    09/13/20 1146  Discontinue IV  Once,   Status:  Canceled      09/13/20 1148    09/13/20 1143  Discharge patient  Once     Comments: Back to his nursing home    09/13/20 1148    09/13/20 1018  chair  alarm  Once      09/13/20 1017    09/13/20 0900  apixaban (ELIQUIS) tablet 2.5 mg  Every 12 Hours Scheduled      09/12/20 1205    09/13/20 0000  apixaban (ELIQUIS) 2.5 MG tablet tablet  Every 12 Hours Scheduled,   Status:  Discontinued      09/13/20 1148    09/13/20 0000  HYDROcodone-acetaminophen (NORCO) 7.5-325 MG per tablet  Every 6 Hours PRN      09/13/20 1148    09/13/20 0000  ondansetron (ZOFRAN) 4 MG tablet  Every 6 Hours PRN,   Status:  Discontinued      09/13/20 1148    09/13/20 0000  Discharge Follow-up with Specified Provider: Dr Araiza for staple removal and follow up; 2 Weeks      09/13/20 1148    09/13/20 0000  Other Activity Instructions     Comments: Activity Instructions: per PT staff and director of nursing home rehab care    09/13/20 1148    09/13/20 0000  Diet: Regular      09/13/20 1148    09/13/20 0000  apixaban (ELIQUIS) 2.5 MG tablet tablet  Every 12 Hours Scheduled      09/13/20 1215    09/13/20 0000  ondansetron (ZOFRAN) 4 MG tablet  Every 6 Hours PRN      09/13/20 1215    09/13/20 0000  atorvastatin (LIPITOR) 40 MG tablet  Daily      09/13/20 1222    09/12/20 1900  clopidogrel (PLAVIX) tablet 75 mg  Daily      09/12/20 1725    09/12/20 1900  carvedilol (COREG) tablet 6.25 mg  2 Times Daily With Meals      09/12/20 1725    09/12/20 1300  sodium chloride 0.9 % with KCl 20 mEq/L infusion  Continuous,   Status:  Discontinued      09/12/20 1205    09/12/20 1205  acetaminophen (TYLENOL) tablet 650 mg  Every 4 Hours PRN      09/12/20 1205    09/12/20 1205  acetaminophen (TYLENOL) suppository 650 mg  Every 4 Hours  PRN      09/12/20 1205    09/12/20 1205  morphine injection 1 mg  Every 2 Hours PRN      09/12/20 1205    09/12/20 1205  naloxone (NARCAN) injection 0.4 mg  Every 5 Minutes PRN      09/12/20 1205    09/12/20 1205  morphine injection 2 mg  Every 2 Hours PRN      09/12/20 1205    09/12/20 1205  naloxone (NARCAN) injection 0.4 mg  Every 5 Minutes PRN      09/12/20 1205    09/12/20 1205  ondansetron (ZOFRAN) tablet 4 mg  Every 6 Hours PRN      09/12/20 1205    09/12/20 1205  ondansetron (ZOFRAN) injection 4 mg  Every 6 Hours PRN      09/12/20 1205    09/12/20 1205  HYDROcodone-acetaminophen (NORCO) 5-325 MG per tablet 1 tablet  Every 4 Hours PRN      09/12/20 1205    09/12/20 1205  HYDROcodone-acetaminophen (NORCO) 7.5-325 MG per tablet 2 tablet  Every 4 Hours PRN      09/12/20 1205    09/12/20 0900  thiamine (VITAMIN B-1) tablet 100 mg  Daily      09/11/20 1733    09/12/20 0900  cholecalciferol (VITAMIN D3) capsule 50,000 Units  Weekly      09/11/20 1733    09/12/20 0600  Basic Metabolic Panel  Daily      09/11/20 1850 09/12/20 0600  CBC & Differential  Daily      09/11/20 1850 09/11/20 2200  Incentive Spirometry  Every 4 Hours While Awake      09/11/20 1850 09/11/20 2100  hydrOXYzine (ATARAX) tablet 25 mg  Nightly      09/11/20 1733 09/11/20 2100  pantoprazole (PROTONIX) EC tablet 40 mg  Nightly      09/11/20 1733    09/11/20 2100  sodium chloride 0.9 % flush 10 mL  Every 12 Hours Scheduled      09/11/20 1850 09/11/20 2100  sacubitril-valsartan (ENTRESTO)  MG tablet 1 tablet  2 times daily      09/11/20 1733    09/11/20 2000  atorvastatin (LIPITOR) tablet 40 mg  Daily      09/11/20 1733 09/11/20 2000  finasteride (PROSCAR) tablet 5 mg  Daily      09/11/20 1733    09/11/20 2000  spironolactone (ALDACTONE) tablet 25 mg  Daily      09/11/20 1733    09/11/20 2000  tamsulosin (FLOMAX) 24 hr capsule 0.4 mg  Daily      09/11/20 1733 09/11/20 8460  sodium chloride 0.9 % flush 10 mL  As Needed       09/11/20 1850 09/11/20 1850  acetaminophen (TYLENOL) tablet 650 mg  Every 4 Hours PRN      09/11/20 1850    09/11/20 1850  acetaminophen (TYLENOL) 160 MG/5ML solution 650 mg  Every 4 Hours PRN      09/11/20 1850    09/11/20 1850  acetaminophen (TYLENOL) suppository 650 mg  Every 4 Hours PRN      09/11/20 1850    09/11/20 1733  acetaminophen (TYLENOL) tablet 500 mg  Every 6 Hours PRN      09/11/20 1733    09/11/20 1733  loperamide (IMODIUM) capsule 2 mg  4 Times Daily PRN      09/11/20 1733    09/11/20 1733  ondansetron (ZOFRAN) tablet 4 mg  Every 8 Hours PRN      09/11/20 1733    Unscheduled  Up With Assistance  As Needed      09/11/20 1850    Unscheduled  Ice to Incision for 48 Hours  As Needed      09/12/20 1205    Unscheduled  Apply Ice to Incision PRN for Edema, After Activity or Exercise, and to Lessen Discomfort  As Needed      09/12/20 1205    Unscheduled  Patient May Shower With Assistance  As Needed      09/12/20 1205    Signed and Held  Follow Anesthesia Guidelines / Protocol  Continuous,   Status:  Canceled      Signed and Held    Signed and Held  Obtain informed consent.  Once,   Status:  Canceled      Signed and Held    Signed and Held  NPO Diet  Diet Effective Midnight,   Status:  Canceled      Signed and Held    Signed and Held  Chlorhexidine Shower or bath BID day Before Surgery  Until Discontinued,   Status:  Canceled     Comments: Chlorhexidine Skin Prep and Instructions For All Patients Having A Procedure Requiring an Outward Incision if Not Allergic. If Allergic, Give Antibacterial Skin Wipes and Instructions. Do Not Use For Facial Cases or on Any Mucus Membranes.    Signed and Held    Signed and Held  Clip operative site.  Continuous,   Status:  Canceled      Signed and Held    --  thiamine (VITAMIN B-1) 100 MG tablet  Daily      09/11/20 1344    --  vitamin D (ERGOCALCIFEROL) 1.25 MG (84344 UT) capsule capsule  Weekly      09/11/20 1344    --  sacubitril-valsartan (ENTRESTO)  MG tablet   2 Times Daily      09/11/20 1344    --  cyanocobalamin 1000 MCG/ML injection  Every 30 Days      09/11/20 1344    --  acetaminophen (TYLENOL) 500 MG tablet  Every 4 Hours PRN      09/11/20 1344                   Respiratory Therapy (last 24 hours)      Respiratory Therapy Flowsheet     Row Name 09/14/20 0613 09/14/20 0300 09/13/20 2055 09/13/20 1829 09/13/20 1744       Oxygen Therapy    SpO2  94 %  98 %  --  97 %  --    Device (Oxygen Therapy)  --  --  nasal cannula  --  --    Flow (L/min)  --  --  2  --  --       Vital Signs    Temp  99.1 °F (37.3 °C)  97.7 °F (36.5 °C)  --  98.6 °F (37 °C)  --    Temp src  --  Axillary  --  Axillary  --    Pulse  66  61  --  62  65    Heart Rate Source  --  Monitor  --  Monitor  --    Resp  18  20  --  18  --    Resp Rate Source  --  Visual  --  Visual  --    BP  117/66  119/63  --  102/54  109/55    Noninvasive MAP (mmHg)  --  90  --  69  79    BP Location  Right arm  Right arm  --  Right arm  Right arm    BP Method  Automatic  Automatic  --  Automatic  Automatic    Patient Position  Lying  Lying  --  Lying  Lying       Assessment    Respiratory WDL  --  --  WDL except;all  --  --    Rhythm/Pattern, Respiratory  --  --  unlabored;pattern regular;depth regular;no shortness of breath reported  --  --    Expansion/Accessory Muscles/Retractions  --  --  no retractions;no use of accessory muscles;expansion symmetric  --  --    Nailbeds  --  --  no discoloration  --  --    Mucous Membranes  --  --  pink;intact;moist  --  --       Breath Sounds    Breath Sounds  --  --  All Fields  --  --    All Lung Fields Breath Sounds  --  --  Anterior:;clear;equal bilaterally  --  --       Treatment/Therapy    Cough And Deep Breathing  --  --  done with encouragement  --  --       Care Plan Interventions    Supportive Measures  --  --  active listening utilized;verbalization of feelings encouraged  --  --    Row Name 09/13/20 1516 09/13/20 1400 09/13/20 1014 09/13/20 1000          Oxygen Therapy     SpO2  91 %  --  93 %  --        Vital Signs    Temp  98.5 °F (36.9 °C)  --  99 °F (37.2 °C)  --     Temp src  Axillary  --  Oral  --     Pulse  67  --  71  --     Heart Rate Source  Monitor  --  Monitor  --     Resp  18  --  16  --     Resp Rate Source  Visual  --  Visual  --     BP  98/56  --  106/54  --     Noninvasive MAP (mmHg)  74  --  75  --     BP Location  Right arm  --  Right arm  --     BP Method  Automatic  --  Automatic  --     Patient Position  Lying  --  Lying  --        Treatment/Therapy    Administration (IS)  --  other (see comments) pt asleep   --  --     Patient Tolerance (IS)  --  --  --  --         Operative/Procedure Notes (last 24 hours) (Notes from 20 0850 through 20 0850)    No notes of this type exist for this encounter.            Physician Progress Notes (last 24 hours) (Notes from 20 0850 through 20 0850)      Clemente Gabriel DO at 20 1221              [facility] HOSPITALIST    PROGRESS NOTE    Name:  Demarcus Pastor   Age:  87 y.o.  Sex:  male  :  1933  MRN:  [id number]   Visit Number:  [id number]  Admission Date:  2020  Date Of Service:  20  Primary Care Physician:  Umm Butt MD     LOS: 2 days :  Patient Care Team:  Umm Butt MD as PCP - General (Geriatric Medicine):    Chief Complaint:      Pain right leg    Subjective / Interval History:   Patient cannot provide meaningful subjective data    Vital Signs:    Temp:  [97.4 °F (36.3 °C)-99 °F (37.2 °C)] 99 °F (37.2 °C)  Heart Rate:  [57-86] 71  Resp:  [16-18] 16  BP: (106-141)/(54-98) 106/54    Intake and output:    I/O last 3 completed shifts:  In: 3243.9 [P.O.:470; I.V.:2773.9]  Out: 1350 [Urine:1250; Blood:100]  I/O this shift:  In: 360 [P.O.:360]  Out: -     Physical Examination:    General Appearance:  Alert to self and cooperative, not in any acute distress.   Head:  Atraumatic and normocephalic, without obvious abnormality.   Eyes:          PERRLA,  conjunctivae and sclerae normal, no Icterus. No pallor. Extra-occular movements are within normal limits.   Neck: Supple, trachea midline, no thyromegaly, no carotid bruit.   Lungs:   Chest shape is normal. Breath sounds heard bilaterally equally.  No crackles or wheezing. No Pleural rub or bronchial breathing.   Heart:  Normal S1 and S2, no murmur, no gallop, no rub. No JVD   Abdomen:   Normal bowel sounds, no masses, no organomegaly. Soft       non-tender, non-distended, no guarding, no rebound tenderness.   Extremities: Moves upper  extremities well, no edema, no cyanosis, no        clubbing.   Skin: No bleeding, bruising or rash.   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    Laboratory results:    Results from last 7 days   Lab Units 09/13/20  0349 09/12/20  0204 09/11/20  0609   SODIUM mmol/L 137 136 136   POTASSIUM mmol/L 4.8 4.2 4.2   CHLORIDE mmol/L 108* 105 103   CO2 mmol/L 19.4* 20.1* 24.7   BUN mg/dL 30* 28* 20   CREATININE mg/dL 1.37* 1.32* 1.56*   CALCIUM mg/dL 7.9* 8.5* 8.7   BILIRUBIN mg/dL  --   --  1.3*   ALK PHOS U/L  --   --  43   ALT (SGPT) U/L  --   --  16   AST (SGOT) U/L  --   --  27   GLUCOSE mg/dL 161* 94 109*     Results from last 7 days   Lab Units 09/13/20  0349 09/12/20  0204 09/11/20  0609   WBC 10*3/mm3 12.16* 9.21 11.55*   HEMOGLOBIN g/dL 13.7 14.1 14.7   HEMATOCRIT % 44.2 43.3 45.0   PLATELETS 10*3/mm3 235 224 214         Results from last 7 days   Lab Units 09/11/20  0609   CK TOTAL U/L 799*           I have reviewed the patient's laboratory results.    Radiology results:    Imaging Results (Last 24 Hours)     ** No results found for the last 24 hours. **          I have reviewed the patient's radiology reports.    Medication Review:     I have reviewed the patients active and prn medications.       Assessment:    Closed fracture of neck of right femur (CMS/HCC)          Plan:    Right femur fracture  Evaluated by ortho  POD 0 RIGHT HIP BIPOLAR  REPLACEMENT        History of CVA  Continue statin  Neuro checks q 8 hrs  EKG reviewed for cardiogenic arrhthymias that could complicate precipitate stroke  ECHO ordered since EF reduced last year  11/2019 ECHO below  · The study is technically difficult for diagnosis.  · The quality of the study is limited due to poor acoustic windows related to limited views obtained(only apical)  · The left ventricular cavity is mildly dilated.  · The following left ventricular wall segments are akinetic: apical anterior, apical lateral, apical inferior, apical septal and apex.  · Left ventricular systolic function is moderately decreased  · Estimated EF appears to be in the range of 36 - 40%  · Left ventricular diastolic dysfunction (grade I) consistent with impaired relaxation.  · The aortic valve is not well visualized. The aortic valve is grossly normal in structure. No aortic valve regurgitation is present. No aortic valve stenosis is present.  · Mitral valve is not well visualized. The mitral valve is grossly normal in structure. Mild mitral valve regurgitation is present. No significant mitral valve stenosis is present.  · There is no evidence of pericardial effusion.        CAD  Continue BB, statin, asa after surgery, Entresto   Would appreciate CARDS cardiac clearance with this patient     Cardiac diet      Tried to DC patient on Sunday but he is a DISCHARGE/READMIT and they didn't have the staff to complete that on Sunday.        40 minutes was spent in chart review, examining the patient and formulating new plans for care.    Clemente Gabriel DO  09/13/20  12:21 EDT    Electronically signed by Clemente Gabriel DO at 09/13/20 1225     Marta Perea APRN at 09/13/20 1109          Inpatient Progress Note    Demarcus Pastor  Date: 09/13/20  MRN: [id number]      Subjective: Patient is a 87 year old male who is status post right hip bipolar arthroplasty, patient is post-op day #1. No complications noted overnight, patient is  requiring pain medication as needed. Pain controlled with medication. Patient has global aphasia.       Objective:  Vitals:    09/13/20 0459 09/13/20 0643 09/13/20 0840 09/13/20 1014   BP:  113/67  106/54   BP Location:  Right arm  Right arm   Patient Position:  Lying  Lying   Pulse:  78  71   Resp:  18  16   Temp:  98.9 °F (37.2 °C)  99 °F (37.2 °C)   TempSrc:  Axillary  Oral   SpO2:  97% 98% 93%   Weight: 60.7 kg (133 lb 12.8 oz)      Height:              Physical Exam:  Constitutional:  Well-developed and well-nourished.  No respiratory distress.      Musculoskeletal: Surgical incision intact with staples, post-op dressing in place, clean dry and intact. No copious drainage. No purulent drainage no indication of infection or cyanosis. + pedal pulses. Neuro intact RLE. Cap refill <3 seconds.   Neurological:  Alert and oriented.  Skin:  Skin is warm and dry.   Psychiatric:  Normal mood and affect.  Peripheral vascular:  No peripheral edema    Labs:    Results from last 7 days   Lab Units 09/13/20  0349 09/12/20  0204 09/11/20  0609   WBC 10*3/mm3 12.16* 9.21 11.55*   HEMOGLOBIN g/dL 13.7 14.1 14.7   HEMATOCRIT % 44.2 43.3 45.0   MCV fL 96.5 93.3 93.0   MCHC g/dL 31.0* 32.6 32.7   PLATELETS 10*3/mm3 235 224 214         Results from last 7 days   Lab Units 09/13/20  0349 09/12/20  0204 09/11/20  0609   SODIUM mmol/L 137 136 136   POTASSIUM mmol/L 4.8 4.2 4.2   CHLORIDE mmol/L 108* 105 103   CO2 mmol/L 19.4* 20.1* 24.7   BUN mg/dL 30* 28* 20   CREATININE mg/dL 1.37* 1.32* 1.56*   EGFR IF NONAFRICN AM mL/min/1.73 49* 51* 42*   CALCIUM mg/dL 7.9* 8.5* 8.7   GLUCOSE mg/dL 161* 94 109*   ALBUMIN g/dL  --   --  3.76   BILIRUBIN mg/dL  --   --  1.3*   ALK PHOS U/L  --   --  43   AST (SGOT) U/L  --   --  27   ALT (SGPT) U/L  --   --  16   Estimated Creatinine Clearance: 32.6 mL/min (A) (by C-G formula based on SCr of 1.37 mg/dL (H)).  No results found for: AMMONIA  Results from last 7 days   Lab Units 09/11/20  0609   CK  TOTAL U/L 799*         No results found for: HGBA1C  Lab Results   Component Value Date    TSH 5.230 (H) 11/10/2019    FREET4 0.98 11/11/2019         Pain Management Panel     Pain Management Panel Latest Ref Rng & Units 1/13/2014    AMPHETAMINES SCREEN, URINE NEGATIVE Negative    BARBITURATES SCREEN NEGATIVE Negative    BENZODIAZEPINE SCREEN, URINE NEGATIVE Negative    COCAINE SCREEN, URINE NEGATIVE Negative    METHADONE SCREEN, URINE NEGATIVE Negative          No results found for: BLOODCX  No results found for: URINECX  No results found for: WOUNDCX  No results found for: STOOLCX              Radiology:  Imaging Results (Last 72 Hours)     Procedure Component Value Units Date/Time    XR Hip 1 View Without Pelvis Right (Surgery Only) [[id number]] Collected: 09/12/20 1104     Updated: 09/12/20 1106    Narrative:      POSTOP RIGHT HIP, 9/12/2020      HISTORY:    Postop surgery for right femoral neck fracture.      TECHNIQUE:  Single AP radiograph of the right hip was obtained portably following surgery.      FINDINGS:  Right proximal femoral endoprosthesis appears well positioned and well seated. No visible periprosthetic fracture or joint dislocation.      Impression:      Satisfactory postoperative appearance following right hip arthroplasty.    Signer Name: Jose Shah MD   Signed: 9/12/2020 11:04 AM   Workstation Name: [facility]    [facility]    XR Femur 2 View Right [[id number]] Collected: 09/11/20 0743     Updated: 09/11/20 0745    Narrative:      CR Femur 2 Vws RT    INDICATION:   Right hip pain    COMPARISON:   Hip series 12/19/2019    FINDINGS:   2 views of the right femur.  There is a complete transverse fracture through the femoral neck. There is slight superior movement of the shaft as relates to the head. Rest the femur is intact.  No bone erosion or destruction. Articulation at the hip and  knee is anatomic.  No foreign body.      Impression:      Complete transverse  fracture through the femoral neck    Signer Name: Oleg Garcia MD   Signed: 9/11/2020 7:43 AM   Workstation Name: [email or web address]    [facility]    XR Hip With or Without Pelvis 2 - 3 View Right [[id number]] Collected: 09/11/20 0742     Updated: 09/11/20 0744    Narrative:      CR Hip Uni Comp Min 2 Vws RT    INDICATION:   Right hip pain today    COMPARISON:   12/19/2019    FINDINGS:  AP and frog-leg lateral view(s) of the right hip.  There is a complete transverse fracture through the femoral neck. There seems be some resorption of the femoral neck and part of the head with perhaps a pseudojoint formation. The bones of the pelvis are  normal. No bone erosion or destruction.        Impression:      Complete fracture through the femoral neck. There may be some or destruction of bone from the head and neck suggesting this is not an acute finding. This was not present 12/19/2019    Signer Name: Oleg Garcia MD   Signed: 9/11/2020 7:42 AM   Workstation Name: [facility]    Radiology [facility]            Assessment:   1. Right hip bipolar arthroplasty    Plan:   Patient is doing well post-operatively. Patient was encouraged to progress his mobility as tolerated with PT/OT. Patient rehab will be complicated secondary to previous history of CVA. Patient plans to return to nursing home facility at discharge to continue rehabilitation. Eliquis for DVT prophylaxis, continue for 35 days post-op. Dressing changes ordered, may discontinue when no drainage is noted to wound. Follow up with Dr. Araiza in 2 weeks for staple removal and re-evaluation.       THAO Xavier  09/13/20  11:09 EDT    Electronically signed by Marta Perea APRN at 09/13/20 1113       Consult Notes (last 24 hours) (Notes from 09/13/20 0850 through 09/14/20 0850)    No notes of this type exist for this encounter.         Physical Therapy Notes (last 24 hours) (Notes from 09/13/20 0850 through 09/14/20 0850)     No notes exist for this encounter.         Occupational Therapy Notes (last 24 hours) (Notes from 09/13/20 0850 through 09/14/20 0850)    No notes exist for this encounter.           Discharge Summary   After Visit Summary  Demarcus Pastor  MRN: [id number]     Broken hip     9/11/2020 - 9/14/2020     [facility]   After Visit Summary  Instructions    ·   Your medications have changed    START taking:  ? apixaban (ELIQUIS)   ? HYDROcodone-acetaminophen (NORCO)   ? ondansetron (ZOFRAN)   Review your updated medication list below.  Your Next Steps    Do    ·    these medications from [facility] - [place], [place] - [address]  [phone] [facility] [phone] FX  ? apixaban  ? atorvastatin  ? ondansetron  ·    these medications from any pharmacy with your printed prescription  ? HYDROcodone-acetaminophen  If you have any questions about your recovery, please call the [facility] Nurse Call Center at [phone]. A registered nurse is available 24 hours a day 7 days a week to assist you.   If you have any COVID-19 related questions, please call [phone].  ·   Activity instructions    Other Activity Instructions   Activity Instructions: per PT staff and director of nursing home rehab care  ·   Diet instructions    Diet: Regular   Discharge Diet:   Regular  ·   Other instructions    Discharge Follow-up with Specified Provider: Dr Araiza for staple removal and follow up; 2 Weeks   What's Next    What's Next          Follow up with Umm Butt  ARTUR APPIAHDESEAN   [facility] [address] [phone]   Your Allergies  Date Reviewed: 9/12/2020  Your Allergies   Allergen Reactions   Penicillins Unknown - Low Severity   Immunizations Administered for This Admission    Name Date   Flulaval/Fluarix Quad 9/11/2020   Patient Belongings Returned    Document Return of Belongings Flowsheet    Were the patient bedside belongings sent home? --   Medications Retrieved from Pharmacy &  Sent Home --   Belongings Sent to Safe --   Belongings sent with: --   Belongings Retrieved from Security & Sent Home --       agencyQ Signup    Our records indicate that your [facility] agencyQ account has been deactivated. If you would like to reactivate your account, please email [email or web address] or call [phone] to talk to our agencyQ staff.  Medication List  Medication List     Morning Afternoon Evening Bedtime As Needed    acetaminophen 500 MG tablet  Commonly known as: TYLENOL  Take 500 mg by mouth Every 4 (Four) Hours As Needed for Mild Pain .         apixaban 2.5 MG tablet tablet  Commonly known as: ELIQUIS  Take 1 tablet by mouth Every 12 (Twelve) Hours for 32 days. Indications: Prophylaxis of Venous Thromboembolism  For: Prophylaxis of Venous Thromboembolism  Last time this was given: 2.5 mg on September 14, 2020  8:26 AM         aspirin 81 MG EC tablet  Take 81 mg by mouth Daily.         atorvastatin 40 MG tablet  Commonly known as: LIPITOR  Take 1 tablet by mouth Daily.  For diagnoses: Broken hip  Last time this was given: 40 mg on September 14, 2020  8:26 AM         carvedilol 6.25 MG tablet  Commonly known as: COREG  Take 6.25 mg by mouth 2 (Two) Times a Day With Meals.  Last time this was given: 6.25 mg on September 14, 2020  8:25 AM         clopidogrel 75 MG tablet  Commonly known as: PLAVIX  Take 75 mg by mouth Daily.  Last time this was given: 75 mg on September 14, 2020  8:26 AM         cyanocobalamin 1000 MCG/ML injection  Inject 1,000 mcg into the appropriate muscle as directed by prescriber Every 30 (Thirty) Days.         finasteride 5 MG tablet  Commonly known as: PROSCAR  Take 5 mg by mouth Daily.  Last time this was given: 5 mg on September 14, 2020  8:26 AM         HYDROcodone-acetaminophen 7.5-325 MG per tablet  Commonly known as: NORCO  Take 2 tablets by mouth Every 6 (Six) Hours As Needed for Severe Pain for up to 6 days.  For diagnoses: Broken hip  Last time this  was given: Ask your nurse or doctor         hydrOXYzine 25 MG tablet  Commonly known as: ATARAX  Take 25 mg by mouth Every Night.  Last time this was given: 25 mg on September 13, 2020  8:55 PM         ondansetron 4 MG tablet  Commonly known as: ZOFRAN  Take 1 tablet by mouth Every 6 (Six) Hours As Needed for Nausea or Vomiting for up to 7 days.  Last time this was given: Ask your nurse or doctor         pantoprazole 40 MG EC tablet  Commonly known as: PROTONIX  Take 40 mg by mouth Every Night.  Last time this was given: 40 mg on September 13, 2020  8:55 PM         sacubitril-valsartan  MG tablet  Commonly known as: ENTRESTO  Take 1 tablet by mouth 2 (Two) Times a Day.  Last time this was given: 1 tablet on September 14, 2020  8:26 AM         spironolactone 25 MG tablet  Commonly known as: ALDACTONE  Take 12.5 mg by mouth Daily.  Last time this was given: 25 mg on September 14, 2020  8:25 AM         tamsulosin 0.4 MG capsule 24 hr capsule  Commonly known as: FLOMAX  Take 1 capsule by mouth Daily.  Last time this was given: 0.4 mg on September 14, 2020  8:25 AM         thiamine 100 MG tablet  Commonly known as: VITAMIN B-1  Take 100 mg by mouth Daily.  Last time this was given: 100 mg on September 14, 2020  8:25 AM         vitamin D 1.25 MG (25751 UT) capsule capsule  Commonly known as: ERGOCALCIFEROL  Take 50,000 Units by mouth 1 (One) Time Per Week.        Where to  your medications    ·    these medications at [facility]. [place], [place] - [address] [place] [address] [phone] [facility] [phone] FX    ? apixaban • atorvastatin • ondansetron  Address: [address] [place] [address]   Phone: [phone] ·      these medications from any pharmacy with your printed prescription    ? HYDROcodone-acetaminophen  Always carry an updated list of your medications with you. If there is an emergency, a responder can quickly see what medications you are taking. Take this paperwork with you the next time  you see your health care provider.      Preventing Surgical Site Infections    Surgical Site Infections FAQs     What is a Surgical Site Infection (SSI)?     A surgical site infection is an infection that occurs after surgery in the part of the body where the surgery took place. Most patients who have surgery do not develop an infection. However, infections develop in about 1 to 3 out of every 100 patients who have surgery.     Some of the common symptoms of a surgical site infection are:     · Redness and pain around the area where you had surgery  · Drainage of cloudy fluid from your surgical wound  · Fever     Can SSIs be treated?     Yes. Most surgical site infections can be treated with antibiotics. The antibiotic given to you depends on the bacteria (germs) causing the infection. Sometimes patients with SSIs also need another surgery to treat the infection.     What are some of the things that hospitals are doing to prevent SSIs?     To prevent SSIs, doctors, nurses, and other healthcare providers:     · Clean their hands and arms up to their elbows with an antiseptic agent just before the surgery.  · Clean their hands with soap and water or an alcohol-based hand rub before and after caring for each patient.  · May remove some of your hair immediately before your surgery using electric clippers if the hair is in the same area where the procedure will occur. They should not shave you with a razor.  · Wear special hair covers, masks, gowns, and gloves during surgery to keep the surgery area clean.  · Give you antibiotics before your surgery starts. In most cases, you should get antibiotics within 60 minutes before the surgery starts and the antibiotics should be stopped within 24 hours after surgery.  · Clean the skin at the site of your surgery with a special soap that kills germs.     What can I do to help prevent SSIs? Before your surgery:     · Tell your doctor about other medical problems you may have.  Health problems such as allergies, diabetes, and obesity could affect your surgery and your treatment.  · Quit smoking. Patients who smoke get more infections. Talk to your doctor about how you can quit before your surgery.  · Do not shave near where you will have surgery. Shaving with a razor can irritate your skin and make it easier to develop an infection.     At the time of your surgery:      · Speak up if someone tries to shave you with a razor before surgery. Ask why you need to be shaved and talk with your surgeon if you have any concerns.  · Ask if you will get antibiotics before surgery.     After your surgery:     · Make sure that your healthcare providers clean their hands before examining you, either with soap and water or an alcohol-based hand rub.  ? If you do not see your providers clean their hands, please ask them to do so.  · Family and friends who visit you should not touch the surgical wound or dressings.  · Family and friends should clean their hands with soap and water or an alcohol-based hand rub before and after visiting you. If you do not see them clean their hands, ask them to clean their hands.     What do I need to do when I go home from the hospital?     · Before you go home, your doctor or nurse should explain everything you need to know about taking care of your wound. Make sure you understand how to care for your wound before you leave the hospital.  · Always clean your hands before and after caring for your wound.  · Before you go home, make sure you know who to contact if you have questions or problems after you get home.  · If you have any symptoms of an infection, such as redness and pain at the surgery site, drainage, or fever, call your doctor immediately.     If you have additional questions, please ask your doctor or nurse.     Developed and co-sponsored by The Society for Healthcare Epidemiology of [place] (SHEA); Infectious Diseases Society of [place] (IDSA); American  Hospital Association; Association for Professionals in Infection Control and Epidemiology (APIC); Centers for Disease Control and Prevention (CDC); and The Joint Commission.     This information is not intended to replace advice given to you by your health care provider. Make sure you discuss any questions you have with your health care provider.     Document Released: 12/23/2014 Document Revised: 11/15/2017 Document Reviewed: 05/08/2017  [employer] Patient Education © 2019 [employer]  PREVENTING SURGICAL SITE INFECTIONS    Pneumonia Vaccination    Please follow up with your primary care provider or retail pharmacy to see if you are eligible for a pneumonia vaccination.      Opioid Resource    If you or someone you know needs information on substance abuse, please visit   https://www.[facility].org/ for listings of facilities and resources across [place].  Stroke Symptoms    · Call 911 or have someone take you to the Emergency Department if you have any of the following:  · Sudden numbness or weakness of your face, arm or leg especially on one side of the body  · Sudden confusion, difficulty speaking or trouble understanding   · Changes in your vision or loss of sight in one eye  · Sudden severe headache with no known cause  · Sudden dizziness, trouble walking, loss of balance or coordination     It is important to seek emergency care right away if you have further stroke symptoms. If you get emergency help quickly, the powerful clot-dissolving medicines can reduce the disabilities caused by a stroke.      For more information:  American Stroke Association  3-634-4-STROKE  www.strokeassociation.org  IF YOU SMOKE OR USE TOBACCO PLEASE READ THE FOLLOWING:  Why is smoking bad for me?  Smoking increases the risk of heart disease, lung disease, vascular disease, stroke, and cancer. If you smoke, STOP!     For more information:  Quit Now [place]  1-800-QUIT-NOW  https://[place].quitlogix.org/en-[place]/      Suicidal Feelings    If you feel like life is too tough and are thinking of suicide or injuring yourself, get help right away!  · Call 911  · Call a suicide hotline to speak to a counselor. 2-242-200-TALK or 6-818-EMYSYKR   Patient Experience    Thank you for choosing [facility]. You may receive a survey following your visit. Please take a moment to share what went well, where we need improvement, and which staff members deserve recognition. We value your input.         YOU ARE THE MOST IMPORTANT FACTOR IN YOUR RECOVERY.      Follow all instructions carefully.      I have reviewed my discharge instructions with my nurse, including the following information, if applicable:                 Information about my illness and diagnosis              Follow up appointments (including lab draws)              Wound Care              Equipment Needs              Medications (new and continuing) along with side effects              Preventative information such as vaccines and smoking cessations              Diet              Pain              I know when to contact my Doctor's office or seek emergency care        I want my nurse to describe the side effects of my medications: YES NO   If the answer is no, I understand the side effects of my medications: YES NO   My nurse described the side effects of my medications in a way that I could understand: YES NO   I have taken my personal belongings and my own medications with me at discharge: YES NO       I have received this information and my questions have been answered. I have discussed any concerns I see with this plan with the nurse or physician. I understand these instructions.     Signature of Patient or Responsible Person: _____________________________________     Date: _________________  Time: __________________     Signature of Healthcare Provider: _______________________________________  Date: _________________  Time: __________________           No notes of this  type exist for this encounter.         Discharge Order (From admission, onward)     Start     Ordered    09/13/20 1143  Discharge patient  Once     Comments: Back to his nursing home   Expected Discharge Date: 09/13/20    Expected Discharge Time: Midday    Discharge Disposition: Home or Self Care    Physician of Record for Attribution - Please select from Treatment Team: WERNER LUTZ [[id number]]    Review needed by CMO to determine Physician of Record: No       Question Answer Comment   Physician of Record for Attribution - Please select from Treatment Team WERNER LUTZ    Review needed by CMO to determine Physician of Record No        09/13/20 1148

## 2020-09-15 LAB
BACTERIA SPEC AEROBE CULT: ABNORMAL
SARS-COV-2 RDRP RESP QL NAA+PROBE: DETECTED
SARS-COV-2 RNA RESP QL NAA+PROBE: DETECTED

## 2020-09-15 PROCEDURE — 99238 HOSP IP/OBS DSCHRG MGMT 30/<: CPT | Performed by: INTERNAL MEDICINE

## 2020-09-15 PROCEDURE — 92610 EVALUATE SWALLOWING FUNCTION: CPT

## 2020-09-15 PROCEDURE — 87635 SARS-COV-2 COVID-19 AMP PRB: CPT | Performed by: INTERNAL MEDICINE

## 2020-09-15 PROCEDURE — 94799 UNLISTED PULMONARY SVC/PX: CPT

## 2020-09-15 RX ORDER — NALOXONE HCL 0.4 MG/ML
0.4 VIAL (ML) INJECTION
Status: DISCONTINUED | OUTPATIENT
Start: 2020-09-15 | End: 2020-09-15

## 2020-09-15 RX ORDER — AMOXICILLIN 250 MG
2 CAPSULE ORAL 2 TIMES DAILY
Qty: 120 TABLET | Refills: 3
Start: 2020-09-15 | End: 2020-10-15

## 2020-09-15 RX ORDER — POLYETHYLENE GLYCOL 3350 17 G/17G
17 POWDER, FOR SOLUTION ORAL DAILY
Qty: 30 PACKET | Refills: 2
Start: 2020-09-16 | End: 2020-10-16

## 2020-09-15 RX ORDER — CEFDINIR 300 MG/1
300 CAPSULE ORAL EVERY 12 HOURS SCHEDULED
Status: DISCONTINUED | OUTPATIENT
Start: 2020-09-15 | End: 2020-09-16 | Stop reason: HOSPADM

## 2020-09-15 RX ORDER — MORPHINE SULFATE 2 MG/ML
1 INJECTION, SOLUTION INTRAMUSCULAR; INTRAVENOUS
Status: DISCONTINUED | OUTPATIENT
Start: 2020-09-15 | End: 2020-09-15

## 2020-09-15 RX ORDER — CEFDINIR 300 MG/1
300 CAPSULE ORAL EVERY 12 HOURS SCHEDULED
Qty: 11 CAPSULE | Refills: 0 | Status: SHIPPED | OUTPATIENT
Start: 2020-09-15 | End: 2020-09-21

## 2020-09-15 RX ORDER — CARVEDILOL 3.12 MG/1
3.12 TABLET ORAL 2 TIMES DAILY WITH MEALS
Status: DISCONTINUED | OUTPATIENT
Start: 2020-09-16 | End: 2020-09-16 | Stop reason: HOSPADM

## 2020-09-15 RX ADMIN — APIXABAN 2.5 MG: 2.5 TABLET, FILM COATED ORAL at 18:56

## 2020-09-15 RX ADMIN — PANTOPRAZOLE SODIUM 40 MG: 40 TABLET, DELAYED RELEASE ORAL at 18:55

## 2020-09-15 RX ADMIN — ACETAMINOPHEN 650 MG: 325 TABLET ORAL at 13:40

## 2020-09-15 RX ADMIN — SPIRONOLACTONE 25 MG: 25 TABLET ORAL at 08:22

## 2020-09-15 RX ADMIN — CEFDINIR 300 MG: 300 CAPSULE ORAL at 18:56

## 2020-09-15 RX ADMIN — POLYETHYLENE GLYCOL (3350) 17 G: 17 POWDER, FOR SOLUTION ORAL at 08:26

## 2020-09-15 RX ADMIN — FINASTERIDE 5 MG: 5 TABLET, FILM COATED ORAL at 08:23

## 2020-09-15 RX ADMIN — TAMSULOSIN HYDROCHLORIDE 0.4 MG: 0.4 CAPSULE ORAL at 08:23

## 2020-09-15 RX ADMIN — HYDROXYZINE HYDROCHLORIDE 25 MG: 25 TABLET ORAL at 18:55

## 2020-09-15 RX ADMIN — CEFDINIR 300 MG: 300 CAPSULE ORAL at 10:07

## 2020-09-15 RX ADMIN — CLOPIDOGREL 75 MG: 75 TABLET, FILM COATED ORAL at 08:24

## 2020-09-15 RX ADMIN — ATORVASTATIN CALCIUM 40 MG: 40 TABLET, FILM COATED ORAL at 08:23

## 2020-09-15 RX ADMIN — SODIUM CHLORIDE, PRESERVATIVE FREE 10 ML: 5 INJECTION INTRAVENOUS at 08:28

## 2020-09-15 RX ADMIN — SACUBITRIL AND VALSARTAN 1 TABLET: 97; 103 TABLET, FILM COATED ORAL at 08:23

## 2020-09-15 RX ADMIN — CARVEDILOL 6.25 MG: 6.25 TABLET, FILM COATED ORAL at 08:32

## 2020-09-15 RX ADMIN — DOCUSATE SODIUM 50 MG AND SENNOSIDES 8.6 MG 2 TABLET: 8.6; 5 TABLET, FILM COATED ORAL at 08:23

## 2020-09-15 RX ADMIN — DOCUSATE SODIUM 50 MG AND SENNOSIDES 8.6 MG 2 TABLET: 8.6; 5 TABLET, FILM COATED ORAL at 18:56

## 2020-09-15 RX ADMIN — SACUBITRIL AND VALSARTAN 1 TABLET: 97; 103 TABLET, FILM COATED ORAL at 18:54

## 2020-09-15 RX ADMIN — CARVEDILOL 6.25 MG: 6.25 TABLET, FILM COATED ORAL at 18:48

## 2020-09-15 RX ADMIN — APIXABAN 2.5 MG: 2.5 TABLET, FILM COATED ORAL at 08:23

## 2020-09-15 RX ADMIN — Medication 100 MG: at 08:23

## 2020-09-15 NOTE — THERAPY RE-EVALUATION
Acute Care - Speech Language Pathology   Swallow Re-Assessment GERMAN Russell     Patient Name: Demarcus Pastor  : 1933  MRN: [id number]  Today's Date: 9/15/2020  Onset of Illness/Injury or Date of Surgery: 20     Referring Physician: Dr. Araiza      Admit Date: 2020    Visit Dx:     ICD-10-CM ICD-9-CM   1. Closed fracture of neck of right femur, initial encounter (CMS/[facility])  S72.001A 820.8   2. History of CVA (cerebrovascular accident)  Z86.73 V12.54     Patient Active Problem List   Diagnosis   • Esophageal stricture   • Hypertension   • Chronic back pain   • Weight loss   • Dementia (CMS/HCC)   • Elevated LFTs   • Dysphagia   • Colitis   • Abnormal CT of the abdomen   • Malnutrition (CMS/[facility])   • Iron deficiency anemia   • Anemia due to vitamin B12 deficiency   • Hypokalemia   • Late onset 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 (CMS/HCC)   • Anemia   • Closed fracture of neck of right femur (CMS/HCC)     Past Medical History:   Diagnosis Date   • Abnormal CT of the abdomen 2016   • Alzheimer's disease with late onset (CMS/HCC)    • Anxiety    • Atelectasis    • Atherosclerotic heart disease    • Cardiomyopathy (CMS/HCC)    • Colitis 2016   • CVA (cerebral vascular accident) (CMS/[facility])    • Diverticulosis    • Dysphagia    • GERD (gastroesophageal reflux disease)    • Hemiplegia (CMS/HCC)    • History of transfusion    • Hyperlipidemia    • Hypertension    • Hypertension    • Hypokalemia    • Inguinal hernia    • Intracardiac thrombosis, not elsewhere classified    • Iron deficiency anemia    • Kidney stone    • Malnutrition (CMS/HCC)    • Muscle weakness    • Neuromuscular dysfunction of bladder    • Pancreatitis      Past Surgical History:   Procedure Laterality Date   • COLONOSCOPY N/A 2016    Procedure: COLONOSCOPY CPT CODE: 01875;  Surgeon: Lilliam Wilson DO;  Location: [facility];  Service:    • ENDOSCOPY N/A 2016    Procedure: ESOPHAGOGASTRODUODENOSCOPY;   Surgeon: Alex Robles III, MD;  Location:  [facility];  Service:    • ENDOSCOPY N/A 11/9/2016    Procedure: ESOPHAGOGASTRODUODENOSCOPY WITH BIOPSY CPT CODE: 56729;  Surgeon: Lilliam Wilson DO;  Location:  [facility];  Service:    • ESOPHAGEAL DILATATION       Mr. Pastor is seen at bedside this am on 3S for diet safety/assessment[place] [facility] [place] recommendation for premorbid baseline modified po diet of puree consistency, nectar thick liquids.     He is a/a this am, upright and centered in bed. PCA is present for part of this assessment. Mr. Pastor accepts approximately 2 ounces of nectar thick juice via cup and straw, applesauce presentations x5. No overt s/s aspiration evidenced, no silent aspiration suspected as he is w/o changes in vocal quality, respirations or secretions post po presentations.     Impression: Per this assessment, Mr. Pastor is accepting his premorbid baseline modified po diet of puree consistency, nectar thick liquids w/o overt s/s aspiration. Recommend continuing current diet, 1:1 feeding assistance.     EDUCATION  The patient has been educated in the following areas:   Dysphagia (Swallowing Impairment) Modified Diet Instruction.    SLP Recommendation and Plan  1. Puree consistency, NECTAR thick liquids.   2. Meds crushed in puree.   3. Universal aspiration precautions.   4. BRISA precautions.   5. Oral care protocol.   6. 1:1 feeding assistance.   No further formal SLP f/u warranted at this time.     Thank you-  Abida Robles M.A., CCC-SLP     Time Calculation:       Therapy Charges for Today     Code Description Service Date Service Provider Modifiers Qty    09247649319 HC ST MOTION FLUORO EVAL SWALLOW 8 9/14/2020 Abida Robles MA,CCC-SLP GN 1    [id number] HC ST EVAL ORAL PHARYNG SWALLOW 2 9/15/2020 Abida Robles MA,CCC-SLP GN 1               Abida Robles MA,CCC-SLP  9/15/2020

## 2020-09-15 NOTE — PLAN OF CARE
Problem: Patient Care Overview  Goal: Plan of Care Review  Recent Flowsheet Documentation  Taken 9/15/2020 1607 by Evelyn Cordero RN  Progress: declining  Plan of Care Reviewed With: patient  Goal: Discharge Needs Assessment  Recent Flowsheet Documentation  Taken 9/15/2020 1607 by Evelyn Cordero RN  Equipment Currently Used at Home: wheelchair  Transportation Anticipated: agency  Readmission Within the Last 30 Days: no previous admission in last 30 days  Patient/Family Anticipated Services at Transition: none  Patient/Family Anticipates Transition to: long-term care facility     Problem: Fall Risk (Adult)  Intervention: Monitor/Assist with Self Care  Recent Flowsheet Documentation  Taken 9/15/2020 1425 by Evelyn Cordero RN  Activity Assistance Provided: assistance, 1 person  Intervention: Promote Injury-Free Environment  Recent Flowsheet Documentation  Taken 9/15/2020 1607 by Evelyn Cordero RN  Safety Promotion/Fall Prevention:   activity supervised   assistive device/personal items within reach   clutter free environment maintained   fall prevention program maintained  Taken 9/15/2020 1425 by Evelyn Cordero RN  Safety Promotion/Fall Prevention:   safety round/check completed   activity supervised   assistive device/personal items within reach   clutter free environment maintained   fall prevention program maintained   nonskid shoes/slippers when out of bed  Intervention: Identify and Manage Contributors to Fall Injury Risk  Recent Flowsheet Documentation  Taken 9/15/2020 1425 by Evelyn Cordero RN  Medication Review/Management: medications reviewed     Problem: Skin Injury Risk (Adult)  Intervention: Promote and Optimize Oral Intake  Recent Flowsheet Documentation  Taken 9/15/2020 1425 by Evelyn Cordero RN  Oral Nutrition Promotion: calorie-dense foods provided  Intervention: Prevent/Manage Excess Moisture  Recent Flowsheet Documentation  Taken 9/15/2020 1425 by Evelyn Cordero RN  Skin Protection: adhesive use  limited  Intervention: Maintain Head of Bed Elevation Less Than 30 Degrees as Tolerated  Recent Flowsheet Documentation  Taken 9/15/2020 1425 by Evelyn Cordero RN  Head of Bed (HOB): HOB elevated  Intervention: Prevent/Minimize Shear/Friction Injuries  Recent Flowsheet Documentation  Taken 9/15/2020 1425 by Evelyn Cordero RN  Pressure Reduction Devices: pressure-redistributing mattress utilized  Positioning/Transfer Devices:   pillows   wedge   in use  Intervention: Prevent or Minimize Pressure  Recent Flowsheet Documentation  Taken 9/15/2020 1607 by Evelyn Cordero RN  Body Position: turned  Taken 9/15/2020 1425 by Evelyn Cordero RN  Pressure Reduction Techniques: frequent weight shift encouraged  Body Position: sitting up in bed     Problem: Fall Injury Risk  Goal: Absence of Fall and Fall-Related Injury  9/15/2020 1607 by Evelyn Cordero RN  Outcome: Ongoing, Not Progressing  9/15/2020 1607 by Evelyn Cordero RN  Outcome: Ongoing, Progressing  Intervention: Identify and Manage Contributors to Fall Injury Risk  Recent Flowsheet Documentation  Taken 9/15/2020 1425 by Evelyn Cordero RN  Medication Review/Management: medications reviewed  Intervention: Promote Injury-Free Environment  Recent Flowsheet Documentation  Taken 9/15/2020 1607 by Evelyn Cordero RN  Safety Promotion/Fall Prevention:   activity supervised   assistive device/personal items within reach   clutter free environment maintained   fall prevention program maintained  Taken 9/15/2020 1425 by Evelyn Cordero RN  Safety Promotion/Fall Prevention:   safety round/check completed   activity supervised   assistive device/personal items within reach   clutter free environment maintained   fall prevention program maintained   nonskid shoes/slippers when out of bed     Problem: Skin Injury Risk Increased  Goal: Skin Health and Integrity  9/15/2020 1607 by Evelyn Cordero RN  Outcome: Ongoing, Not Progressing  9/15/2020 1607 by Evelyn Cordero RN  Outcome: Ongoing,  Progressing  Intervention: Optimize Skin Protection  Recent Flowsheet Documentation  Taken 9/15/2020 1425 by Evelyn Cordero RN  Pressure Reduction Techniques: frequent weight shift encouraged  Head of Bed (HOB): HOB elevated  Pressure Reduction Devices: pressure-redistributing mattress utilized  Skin Protection: adhesive use limited  Intervention: Promote and Optimize Oral Intake  Recent Flowsheet Documentation  Taken 9/15/2020 1425 by Evelyn Cordero RN  Oral Nutrition Promotion: calorie-dense foods provided     Problem: Surgical Site Infection  Goal: Improved Infection Symptoms  9/15/2020 1607 by Evelyn Cordero RN  Outcome: Ongoing, Not Progressing  9/15/2020 1607 by Evelyn Cordero RN  Outcome: Ongoing, Progressing  Intervention: Prevent and Manage Infection  Recent Flowsheet Documentation  Taken 9/15/2020 1425 by Evelyn Cordero RN  Infection Management: aseptic technique maintained     Problem: Adjustment to Injury (Hip Fracture)  Goal: Optimal Coping with Change in Health Status  9/15/2020 1607 by Evelyn Cordero RN  Outcome: Ongoing, Not Progressing  9/15/2020 1607 by Evelyn Cordero RN  Outcome: Ongoing, Progressing  Intervention: Support Psychosocial Response to Injury and Mobility Change  Recent Flowsheet Documentation  Taken 9/15/2020 1425 by Evelyn Cordero RN  Supportive Measures: active listening utilized  Family/Support System Care: support provided     Problem: Bleeding (Hip Fracture)  Goal: Absence of Bleeding  9/15/2020 1607 by Evelyn Cordero RN  Outcome: Ongoing, Not Progressing  9/15/2020 1607 by Evelyn Cordero RN  Outcome: Ongoing, Progressing     Problem: Bowel Elimination Impaired (Hip Fracture)  Goal: Effective Bowel Elimination  9/15/2020 1607 by Evelyn Cordero RN  Outcome: Ongoing, Not Progressing  9/15/2020 1607 by Evelyn Cordero RN  Outcome: Ongoing, Progressing     Problem: Delayed Union/Nonunion (Hip Fracture)  Goal: Fracture Stability  9/15/2020 1607 by Evelyn Cordero RN  Outcome: Ongoing,  Not Progressing  9/15/2020 1607 by Evelyn Cordero RN  Outcome: Ongoing, Progressing     Problem: Embolism (Hip Fracture)  Goal: Absence of Embolism  9/15/2020 1607 by Evelyn Cordero RN  Outcome: Ongoing, Not Progressing  9/15/2020 1607 by Evelyn Cordero RN  Outcome: Ongoing, Progressing  Intervention: Prevent and Manage Embolism Risk  Recent Flowsheet Documentation  Taken 9/15/2020 1607 by Evelyn Cordero RN  VTE Prevention/Management: compression stockings on  Taken 9/15/2020 1425 by Evelyn Cordero RN  VTE Prevention/Management: (See MAR)   compression stockings on   other (see comments)     Problem: Functional Ability Impaired (Hip Fracture)  Goal: Optimal Functional Performance  9/15/2020 1607 by Evelyn Cordero RN  Outcome: Ongoing, Not Progressing  9/15/2020 1607 by Evelyn Cordero RN  Outcome: Ongoing, Progressing  Intervention: Promote Optimal Functional Status  Recent Flowsheet Documentation  Taken 9/15/2020 1425 by Evelyn Cordero RN  Activity Management: bedrest     Problem: Pain (Hip Fracture)  Goal: Acceptable Pain Level  9/15/2020 1607 by Evelyn Cordero RN  Outcome: Ongoing, Not Progressing  9/15/2020 1607 by Evelyn Cordero RN  Outcome: Ongoing, Progressing     Problem: Urinary Elimination Impaired (Hip Fracture)  Goal: Effective Urinary Elimination  9/15/2020 1607 by Evelyn Cordero RN  Outcome: Ongoing, Not Progressing  9/15/2020 1607 by Evelyn Cordero RN  Outcome: Ongoing, Progressing     Problem: Pain Acute  Goal: Optimal Pain Control  9/15/2020 1607 by Evelyn Coredro RN  Outcome: Ongoing, Not Progressing  9/15/2020 1607 by Evelyn Cordero RN  Outcome: Ongoing, Progressing  Intervention: Prevent or Manage Pain  Recent Flowsheet Documentation  Taken 9/15/2020 1425 by Evelyn Cordero RN  Sensory Stimulation Regulation: care clustered  Intervention: Optimize Psychosocial Wellbeing  Recent Flowsheet Documentation  Taken 9/15/2020 1425 by Evelyn Cordero RN  Supportive Measures: active listening  utilized  Diversional Activities: television     Problem: Adult Inpatient Plan of Care  Goal: Plan of Care Review  9/15/2020 1607 by Evelyn Cordero RN  Outcome: Ongoing, Not Progressing  Flowsheets (Taken 9/15/2020 1607)  Progress: declining  9/15/2020 1607 by Evelyn Cordero RN  Outcome: Ongoing, Progressing  Flowsheets (Taken 9/15/2020 1607)  Progress: declining  Plan of Care Reviewed With: patient  Goal: Patient-Specific Goal (Individualized)  9/15/2020 1607 by Evelyn Cordero RN  Outcome: Ongoing, Not Progressing  9/15/2020 1607 by Evelyn Cordero RN  Outcome: Ongoing, Progressing  Goal: Absence of Hospital-Acquired Illness or Injury  9/15/2020 1607 by Evelyn Cordero RN  Outcome: Ongoing, Not Progressing  9/15/2020 1607 by Evelyn Cordero RN  Outcome: Ongoing, Progressing  Intervention: Identify and Manage Fall Risk  Flowsheets  Taken 9/15/2020 1607  Safety Promotion/Fall Prevention:   activity supervised   assistive device/personal items within reach   clutter free environment maintained   fall prevention program maintained  Taken 9/15/2020 1425  Safety Promotion/Fall Prevention:   safety round/check completed   activity supervised   assistive device/personal items within reach   clutter free environment maintained   fall prevention program maintained   nonskid shoes/slippers when out of bed  Intervention: Prevent Skin Injury  Flowsheets  Taken 9/15/2020 1607  Body Position: turned  Taken 9/15/2020 1425  Body Position: sitting up in bed  Intervention: Prevent and Manage VTE (venous thromboembolism) Risk  Flowsheets  Taken 9/15/2020 1607  VTE Prevention/Management: compression stockings on  Taken 9/15/2020 1425  VTE Prevention/Management: (See MAR)   compression stockings on   other (see comments)  Intervention: Prevent Infection  Flowsheets  Taken 9/15/2020 1607  Infection Prevention: rest/sleep promoted  Taken 9/15/2020 1425  Infection Prevention: rest/sleep promoted  Goal: Optimal Comfort and Wellbeing  9/15/2020  1607 by Evelyn Cordero RN  Outcome: Ongoing, Not Progressing  9/15/2020 1607 by Evelyn Cordero RN  Outcome: Ongoing, Progressing  Intervention: Provide Person-Centered Care  Flowsheets  Taken 9/15/2020 1607  Trust Relationship/Rapport: care explained  Taken 9/15/2020 1425  Trust Relationship/Rapport: care explained  Goal: Readiness for Transition of Care  9/15/2020 1607 by Evelyn Cordero RN  Outcome: Ongoing, Not Progressing  9/15/2020 1607 by Evelyn Cordero RN  Outcome: Ongoing, Progressing  Intervention: Mutually Develop Transition Plan  Flowsheets (Taken 9/15/2020 1607)  Equipment Currently Used at Home: wheelchair  Transportation Anticipated: agency  Readmission Within the Last 30 Days: no previous admission in last 30 days  Patient/Family Anticipated Services at Transition: none  Patient/Family Anticipates Transition to: long-term care facility     Problem: Swallowing Impairment  Goal: Improved Swallowing Without Aspiration  Intervention: Optimize Eating and Swallowing  Recent Flowsheet Documentation  Taken 9/15/2020 1425 by Evelyn Cordero RN  Aspiration Precautions: awake/alert before oral intake  Swallowing Interventions: Dysphagia: small bites/sips encouraged  Swallowing Method: throat clear/extra swallow   Goal Outcome Evaluation:  Plan of Care Reviewed With: patient  Progress: declining  Outcome Summary: pt. experiencing intense pain with R LE ROM.

## 2020-09-15 NOTE — PROGRESS NOTES
Discharge Planning Assessment   Drew     Patient Name: Demarcus Pastor  MRN: [id number]  Today's Date: 9/15/2020    Admit Date: 9/11/2020      Discharge Plan     Row Name 09/15/20 1316       Plan    Plan  Pt admitted from [facility].  Pt has a skilled bed reserved per Siri.  Pt tested positive for Covid on this date.  Per Supervisor and [facility]  pt covid test to be repeated if test continues to be positive pt [facility].  SS will continue to follow.        Continued Care and Services - Admitted Since 9/11/2020     Destination     Service Provider Request Status Selected Services Address Phone Fax    [facility]  Pending - Request Sent N/A [address] [place] [id number] [phone]             KAILEE Ho

## 2020-09-15 NOTE — DISCHARGE SUMMARY
[facility] Medicine Services  DISCHARGE SUMMARY    Patient Identification:  Name:  Demarcus Pastor  Age:  87 y.o.  Sex:  male  :  1933  MRN:  [id number]  Visit Number:  [id number]    Date of Admission: 2020  Date of Discharge:  2020    PCP: Umm Butt MD      Admission/Discharge Diagnoses     Discharge Diagnoses:  · Femur femoral neck fracture  · Possible UTI  · Constipation  · Chronic medical conditions  · History of CVA with aphasia and right hemiparesis  · CAD  · Mild rhabdomyelisis  · Incidental COVID-19 positive on surveillance screening prior to d/c. asymptomatic    Chronic systolic congestive heart failure EF 36 to 40%  · Iron deficiency anemia  · Hyperlipidemia  · BPH  · GERD  · Insomnia  · B12 deficiency  · CKD 3    Consults/Procedures     Consults:   Consults     Date and Time Order Name Status Description    2020 1154 Inpatient Cardiology Consult Completed     2020 1132 Inpatient Orthopedic Surgery Consult Completed     2020 0732 Ortho (on-call MD unless specified) Completed     2020 0731 Hospitalist (on-call MD unless specified) Completed           Procedures Performed:  Procedure(s):  HIP BIPOLAR REPLACEMENT       History of Presenting Illness     Mr. Pastor is an 87-year-old male resident of a nursing home with complaint of right hip pain.  On arrival to the emergency room work-up showed 8 transverse fractures through the femoral neck.  Unclear if patient had a fall recently at nursing home.  He also has a history of CVA with dementia and expressive aphasia. He was not able to provide history.    Hospital Course     Orthopedic surgery, Dr Araiza, was consulted on admission and performed right hip bipolar arthroplasty on .  He has been doing well with physical therapy here.  He was noted to have some abdominal distention and tenderness yesterday.  KUB showed large stool burden.  He was given aggressive bowel regimen which resulted  to several bowel movements last night and this morning.  His constipation has been resolved.  I have added some bowel regimen to prevent future constipations.  He can continue therapy upon return back to nursing home.  Orthopedic surgery recommends dressing changes daily and turgor no drainage noted on dressing then they can leave to air.  He will need follow-up with orthopedic surgery in 2 weeks.  They recommend DVT prophylaxis with low-dose apixaban for 35 days postoperatively.  Please monitor for any signs of bleeding.    UA was obtained yesterday due to slight leukocytosis and was found to have elevated WBC.  Urine was sent for culture which is currently growing gram-negative emeka.  Culture is not finalized.  I have started him on Rocephin x1 dose yesterday.  I have transitioned this to cefdinir to complete additional 6 days to complete a week course.  Please follow-up on final results of urine culture to assure current antibiotic is adequate.    (Note patient was found to have positive COVID on surveillance testing before discharge today.  This was confirmed with an Abbott test.  Patient remains asymptomatic.  This was communicated with nursing home and they are going to quarantine him upon arrival to nursing home and continue to monitor his symptoms).  Discharge Vitals/Physical Examination     Vital Signs:  Temp:  [97.6 °F (36.4 °C)-100.5 °F (38.1 °C)] 98.4 °F (36.9 °C)  Heart Rate:  [44-65] 61  Resp:  [15-20] 20  BP: ()/(50-78) 120/68  Mean Arterial Pressure (Non-Invasive) for the past 24 hrs (Last 3 readings):   Noninvasive MAP (mmHg)   09/16/20 0420 89   09/16/20 0305 95   09/16/20 0105 72     SpO2 Percentage    09/16/20 0105 09/16/20 0305 09/16/20 0420   SpO2: 96% 97% 96%     SpO2:  [83 %-100 %] 96 %  on  Flow (L/min):  [2] 2;   Device (Oxygen Therapy): room air    Body mass index is 18.85 kg/m².  Wt Readings from Last 3 Encounters:   09/16/20 63 kg (139 lb)   08/07/20 51.7 kg (114 lb)   12/19/19 52.6  kg (116 lb)         Physical Exam:  GEN: NAD  CV: RRR, no audible murmur  PULM: CTA, no wheeze or crackles  GI: +bs, soft and NT  SKIN: no edema, incision dry and intact.     Pertinent Laboratory/Radiology Results     Pertinent Laboratory Results:  Results from last 7 days   Lab Units 09/11/20  0609   CK TOTAL U/L 799*           Results from last 7 days   Lab Units 09/16/20  0031 09/14/20 0249 09/13/20  0349   CRP mg/dL 9.43*  --   --    WBC 10*3/mm3 7.76 12.20* 12.16*   HEMOGLOBIN g/dL 11.2* 11.5* 13.7   HEMATOCRIT % 36.9* 36.5* 44.2   MCV fL 98.1* 96.3 96.5   MCHC g/dL 30.4* 31.5 31.0*   PLATELETS 10*3/mm3 279 225 235     Results from last 7 days   Lab Units 09/16/20  0031 09/14/20 0958 09/14/20 0249 09/13/20 0349 09/11/20  0609   SODIUM mmol/L 139 136 128* 137   < > 136   POTASSIUM mmol/L 3.6  --  4.0 4.8   < > 4.2   CHLORIDE mmol/L 110*  --  96* 108*   < > 103   CO2 mmol/L 19.6*  --  20.3* 19.4*   < > 24.7   BUN mg/dL 27*  --  27* 30*   < > 20   CREATININE mg/dL 1.28*  --  1.19 1.37*   < > 1.56*   EGFR IF NONAFRICN AM mL/min/1.73 53*  --  58* 49*   < > 42*   CALCIUM mg/dL 7.9*  --  7.5* 7.9*   < > 8.7   GLUCOSE mg/dL 97  --  111* 161*   < > 109*   ALBUMIN g/dL 3.09*  --   --   --   --  3.76   BILIRUBIN mg/dL 0.6  --   --   --   --  1.3*   ALK PHOS U/L 47  --   --   --   --  43   AST (SGOT) U/L 57*  --   --   --   --  27   ALT (SGPT) U/L 25  --   --   --   --  16    < > = values in this interval not displayed.   Estimated Creatinine Clearance: 36.3 mL/min (A) (by C-G formula based on SCr of 1.28 mg/dL (H)).  No results found for: AMMONIA    No results found for: HGBA1C, POCGLU  No results found for: HGBA1C  Lab Results   Component Value Date    TSH 5.230 (H) 11/10/2019    FREET4 0.98 11/11/2019       No results found for: BLOODCX  Urine Culture   Date Value Ref Range Status   09/14/2020 <10,000 CFU/mL Gram Negative Bacilli (A)  Final        Microbiology Results (last 10 days)     Procedure Component Value -  Date/Time    COVID-19, ABBOTT IN-HOUSE,NP Swab (NO TRANSPORT MEDIA) 2 HR TAT - Swab, Nasopharynx [[id number]]  (Abnormal) Collected: 09/15/20 1653    Lab Status: Final result Specimen: Swab from Nasopharynx Updated: 09/15/20 1742     COVID19 Detected    Narrative:      Fact sheet for providers: https://www.fda.gov/media/690850/download     Fact sheet for patients: https://www.fda.gov/media/965000/download    COVID-19,CEPHEID,COR/ANJUM/PAD IN-HOUSE(OR EMERGENT/ADD-ON),NP SWAB IN TRANSPORT MEDIA 3-4 HR TAT - Swab, Nasopharynx [[id number]]  (Abnormal) Collected: 09/15/20 1107    Lab Status: Final result Specimen: Swab from Nasopharynx Updated: 09/15/20 1207     COVID19 Detected    Narrative:      Fact sheet for providers: https://www.fda.gov/media/573584/download     Fact sheet for patients: https://www.fda.gov/media/110606/download    Urine Culture - Urine, Urine, Random Void [[id number]]  (Abnormal) Collected: 09/14/20 1101    Lab Status: Final result Specimen: Urine, Random Void Updated: 09/15/20 1031     Urine Culture <10,000 CFU/mL Gram Negative Bacilli    Narrative:      Call if further workup needed.      COVID PRE-OP / PRE-PROCEDURE SCREENING ORDER (NO ISOLATION) - Swab, Nasal Cavity [[id number]]  (Normal) Collected: 09/11/20 1056    Lab Status: Final result Specimen: Swab from Nasal Cavity Updated: 09/11/20 1122    Narrative:      The following orders were created for panel order COVID PRE-OP / PRE-PROCEDURE SCREENING ORDER (NO ISOLATION) - Swab, Nasal Cavity.  Procedure                               Abnormality         Status                     ---------                               -----------         ------                     COVID-19, ABBOTT IN-HOUS...[[id number]]  Normal              Final result                 Please view results for these tests on the individual orders.    COVID-19, ABBOTT IN-HOUSE,NP Swab (NO TRANSPORT MEDIA) 2 HR TAT - Swab, Nasal Cavity [[id number]]  (Normal) Collected: 09/11/20 1056     Lab Status: Final result Specimen: Swab from Nasal Cavity Updated: 09/11/20 1122     COVID19 Not Detected    Narrative:      Fact sheet for providers: https://www.fda.gov/media/383353/download     Fact sheet for patients: https://www.fda.gov/media/034419/download        Pain Management Panel     Pain Management Panel Latest Ref Rng & Units 1/13/2014    AMPHETAMINES SCREEN, URINE NEGATIVE Negative    BARBITURATES SCREEN NEGATIVE Negative    BENZODIAZEPINE SCREEN, URINE NEGATIVE Negative    COCAINE SCREEN, URINE NEGATIVE Negative    METHADONE SCREEN, URINE NEGATIVE Negative          Pertinent Radiology Results:  Imaging Results (All)     Procedure Component Value Units Date/Time    FL Video Swallow Single Contrast [[id number]] Collected: 09/14/20 1407     Updated: 09/14/20 1414    Narrative:      FL VIDEO SWALLOW SINGLE-CONTRAST-     CLINICAL INDICATION: aspiration; S72.001A-Fracture of unspecified part  of neck of right femur, initial encounter for closed fracture;  Z86.73-Personal history of transient ischemic attack (TIA), and cerebral  infarction without residual deficits        TECHNIQUE:   Speech therapy service was present. The patient was examined in the  sitting lateral position and was given several consistencies of barium.     FINDINGS: Aspiration with thin liquids     FLUOROSCOPY TIME: 0.6 minutes     Images: Cine loop was acquired       Impression:      Aspiration with thin liquids     For additional information please see the report provided by the speech  therapy service     This report was finalized on 9/14/2020 2:11 PM by Dr. Paul Aranda MD.       XR Abdomen KUB [[id number]] Collected: 09/14/20 1236     Updated: 09/14/20 1257    Narrative:      EXAMINATION: XR ABDOMEN KUB-      CLINICAL INDICATION: distention and pain; S72.001A-Fracture of  unspecified part of neck of right femur, initial encounter for closed  fracture; Z86.73-Personal history of transient ischemic attack (TIA),  and cerebral  infarction without residual deficits        COMPARISON: 11/12/2019     FINDINGS: One view of the abdomen shows interval placement of a right  hip prosthesis. Overlying skin clips are present.     Moderate to large volume stool in the colon.       Impression:      1. Postoperative right hip prosthesis.  2. Moderate to large volume stool.      This report was finalized on 9/14/2020 12:55 PM by Dr. Paul Aranda MD.       XR Hip 1 View Without Pelvis Right (Surgery Only) [[id number]] Collected: 09/12/20 1104     Updated: 09/12/20 1106    Narrative:      POSTOP RIGHT HIP, 9/12/2020      HISTORY:    Postop surgery for right femoral neck fracture.      TECHNIQUE:  Single AP radiograph of the right hip was obtained portably following surgery.      FINDINGS:  Right proximal femoral endoprosthesis appears well positioned and well seated. No visible periprosthetic fracture or joint dislocation.      Impression:      Satisfactory postoperative appearance following right hip arthroplasty.    Signer Name: Jose Shah MD   Signed: 9/12/2020 11:04 AM   Workstation Name: [id number]    [facility]    XR Femur 2 View Right [[id number]] Collected: 09/11/20 0743     Updated: 09/11/20 0745    Narrative:      CR Femur 2 Vws RT    INDICATION:   Right hip pain    COMPARISON:   Hip series 12/19/2019    FINDINGS:   2 views of the right femur.  There is a complete transverse fracture through the femoral neck. There is slight superior movement of the shaft as relates to the head. Rest the femur is intact.  No bone erosion or destruction. Articulation at the hip and  knee is anatomic.  No foreign body.      Impression:      Complete transverse fracture through the femoral neck    Signer Name: Oleg Garcia MD   Signed: 9/11/2020 7:43 AM   Workstation Name: [id number]    [facility]    XR Hip With or Without Pelvis 2 - 3 View Right [[id number]] Collected: 09/11/20 0742     Updated: 09/11/20  0744    Narrative:      CR Hip Uni Comp Min 2 Vws RT    INDICATION:   Right hip pain today    COMPARISON:   12/19/2019    FINDINGS:  AP and frog-leg lateral view(s) of the right hip.  There is a complete transverse fracture through the femoral neck. There seems be some resorption of the femoral neck and part of the head with perhaps a pseudojoint formation. The bones of the pelvis are  normal. No bone erosion or destruction.        Impression:      Complete fracture through the femoral neck. There may be some or destruction of bone from the head and neck suggesting this is not an acute finding. This was not present 12/19/2019    Signer Name: Oleg Garcia MD   Signed: 9/11/2020 7:42 AM   Workstation Name: [email or web address]    [facility]          Test Results Pending at Discharge:      Discharge Disposition/Discharge Medications/Discharge Appointments     Discharge Disposition:   Nursing home    Condition at Discharge:  Stable     DME Prescribed at Discharge:  As per NH    Discharge Diet:  Diet Instructions     Diet: Dysphagia; Nectar / Syrup Thick Liquids; Pureed      Discharge Diet: Dysphagia    Fluid Consistency: Nectar / Syrup Thick Liquids    Pureed Options: Pureed    Diet: Regular      Discharge Diet: Regular          Discharge Activity:  Activity Instructions     Activity as Tolerated      Other Activity Instructions      Activity Instructions: per PT staff and director of nursing home rehab care          Code Status While Inpatient:  Code Status and Medical Interventions:   Ordered at: 09/11/20 1135     Level Of Support Discussed With:    Patient     Code Status:    CPR     Medical Interventions (Level of Support Prior to Arrest):    Full       Discharge Medications:     Discharge Medications      New Medications      Instructions Start Date   apixaban 2.5 MG tablet tablet  Commonly known as: ELIQUIS   2.5 mg, Oral, Every 12 Hours Scheduled      cefdinir 300 MG capsule  Commonly known as:  OMNICEF   300 mg, Oral, Every 12 Hours Scheduled      ondansetron 4 MG tablet  Commonly known as: ZOFRAN   4 mg, Oral, Every 6 Hours PRN      polyethylene glycol 17 g packet  Commonly known as: MIRALAX   17 g, Oral, Daily      sennosides-docusate 8.6-50 MG per tablet  Commonly known as: PERICOLACE   2 tablets, Oral, 2 Times Daily         Changes to Medications      Instructions Start Date   carvedilol 6.25 MG tablet  Commonly known as: COREG  What changed: how much to take   3.125 mg, Oral, 2 Times Daily With Meals         Continue These Medications      Instructions Start Date   acetaminophen 500 MG tablet  Commonly known as: TYLENOL   500 mg, Oral, Every 4 Hours PRN      aspirin 81 MG EC tablet   81 mg, Oral, Daily      atorvastatin 40 MG tablet  Commonly known as: LIPITOR   40 mg, Oral, Daily      clopidogrel 75 MG tablet  Commonly known as: PLAVIX   75 mg, Oral, Daily      cyanocobalamin 1000 MCG/ML injection   1,000 mcg, Intramuscular, Every 30 Days      finasteride 5 MG tablet  Commonly known as: PROSCAR   5 mg, Oral, Daily      hydrOXYzine 25 MG tablet  Commonly known as: ATARAX   25 mg, Oral, Nightly      pantoprazole 40 MG EC tablet  Commonly known as: PROTONIX   40 mg, Oral, Nightly      sacubitril-valsartan  MG tablet  Commonly known as: ENTRESTO   1 tablet, Oral, 2 Times Daily      spironolactone 25 MG tablet  Commonly known as: ALDACTONE   12.5 mg, Oral, Daily      tamsulosin 0.4 MG capsule 24 hr capsule  Commonly known as: FLOMAX   1 capsule, Oral, Daily      thiamine 100 MG tablet  Commonly known as: VITAMIN B-1   100 mg, Oral, Daily      vitamin D 1.25 MG (26574 UT) capsule capsule  Commonly known as: ERGOCALCIFEROL   50,000 Units, Oral, Weekly             Discharge Appointments:      Additional Instructions for the Follow-ups that You Need to Schedule     Discharge Follow-up with Specified Provider: Dr Araiza for staple removal and follow up; 2 Weeks   As directed      To: Dr Araiza for  staple removal and follow up    Follow Up: 2 Weeks         Discharge Follow-up with Specified Provider: Richard; 2 Weeks   As directed      To: Richard    Follow Up: 2 Weeks              Katayoun Behbahani, MD  Hospitalist Service -- [facility]       09/16/20  07:36 EDT    Discharge Time: < 30 minutes    Of note, patient did not get transportation in time and was not able to leave yesterday.  He was noted to be slightly bradycardic in the 40's at night after coreg dose with one low BP that recovered spontaneously. This morning his HR is 61 (as it has been the entire hospital stay), / 68, satting 96% on RA. He remains asymptomatic from COVID. He was also asymptomatic when bradycardic in the 40's. I recommend to decrease home coreg dose to 3.125 mg BID. He is ready for discharge today. He will need to continue Apixaban for 35 days post op for DVT PPX from hip fracture.

## 2020-09-15 NOTE — PLAN OF CARE
Mr. Pastor is accepting his least restrictive premorbid baseline modified po diet of puree consistency, nectar thick liquids w/o overt s/s aspiration. No further formal SLP f/u warranted at this time.

## 2020-09-15 NOTE — PLAN OF CARE
Pt resting with no complaints. Has had multiple xlarge bm. Held magnesium citrate. Will continue to monitor and follow plan of care.

## 2020-09-16 VITALS
OXYGEN SATURATION: 98 % | DIASTOLIC BLOOD PRESSURE: 65 MMHG | HEIGHT: 72 IN | RESPIRATION RATE: 18 BRPM | TEMPERATURE: 98.9 F | BODY MASS INDEX: 18.83 KG/M2 | WEIGHT: 139 LBS | HEART RATE: 50 BPM | SYSTOLIC BLOOD PRESSURE: 104 MMHG

## 2020-09-16 LAB
ALBUMIN SERPL-MCNC: 3.09 G/DL (ref 3.5–5.2)
ALBUMIN/GLOB SERPL: 1 G/DL
ALP SERPL-CCNC: 47 U/L (ref 39–117)
ALT SERPL W P-5'-P-CCNC: 25 U/L (ref 1–41)
ANION GAP SERPL CALCULATED.3IONS-SCNC: 9.4 MMOL/L (ref 5–15)
AST SERPL-CCNC: 57 U/L (ref 1–40)
BASOPHILS # BLD AUTO: 0.06 10*3/MM3 (ref 0–0.2)
BASOPHILS NFR BLD AUTO: 0.8 % (ref 0–1.5)
BILIRUB SERPL-MCNC: 0.6 MG/DL (ref 0–1.2)
BUN SERPL-MCNC: 27 MG/DL (ref 8–23)
BUN/CREAT SERPL: 21.1 (ref 7–25)
CALCIUM SPEC-SCNC: 7.9 MG/DL (ref 8.6–10.5)
CHLORIDE SERPL-SCNC: 110 MMOL/L (ref 98–107)
CO2 SERPL-SCNC: 19.6 MMOL/L (ref 22–29)
CREAT SERPL-MCNC: 1.28 MG/DL (ref 0.76–1.27)
CRP SERPL-MCNC: 9.43 MG/DL (ref 0–0.5)
DEPRECATED RDW RBC AUTO: 57.5 FL (ref 37–54)
EOSINOPHIL # BLD AUTO: 0.07 10*3/MM3 (ref 0–0.4)
EOSINOPHIL NFR BLD AUTO: 0.9 % (ref 0.3–6.2)
ERYTHROCYTE [DISTWIDTH] IN BLOOD BY AUTOMATED COUNT: 16 % (ref 12.3–15.4)
GFR SERPL CREATININE-BSD FRML MDRD: 53 ML/MIN/1.73
GLOBULIN UR ELPH-MCNC: 3.1 GM/DL
GLUCOSE SERPL-MCNC: 97 MG/DL (ref 65–99)
HCT VFR BLD AUTO: 36.9 % (ref 37.5–51)
HGB BLD-MCNC: 11.2 G/DL (ref 13–17.7)
IMM GRANULOCYTES # BLD AUTO: 0.1 10*3/MM3 (ref 0–0.05)
IMM GRANULOCYTES NFR BLD AUTO: 1.3 % (ref 0–0.5)
LYMPHOCYTES # BLD AUTO: 2.8 10*3/MM3 (ref 0.7–3.1)
LYMPHOCYTES NFR BLD AUTO: 36.1 % (ref 19.6–45.3)
MCH RBC QN AUTO: 29.8 PG (ref 26.6–33)
MCHC RBC AUTO-ENTMCNC: 30.4 G/DL (ref 31.5–35.7)
MCV RBC AUTO: 98.1 FL (ref 79–97)
MONOCYTES # BLD AUTO: 1.22 10*3/MM3 (ref 0.1–0.9)
MONOCYTES NFR BLD AUTO: 15.7 % (ref 5–12)
NEUTROPHILS NFR BLD AUTO: 3.51 10*3/MM3 (ref 1.7–7)
NEUTROPHILS NFR BLD AUTO: 45.2 % (ref 42.7–76)
NRBC BLD AUTO-RTO: 0.9 /100 WBC (ref 0–0.2)
PLATELET # BLD AUTO: 279 10*3/MM3 (ref 140–450)
PMV BLD AUTO: 10.6 FL (ref 6–12)
POTASSIUM SERPL-SCNC: 3.6 MMOL/L (ref 3.5–5.2)
PROT SERPL-MCNC: 6.2 G/DL (ref 6–8.5)
RBC # BLD AUTO: 3.76 10*6/MM3 (ref 4.14–5.8)
SODIUM SERPL-SCNC: 139 MMOL/L (ref 136–145)
WBC # BLD AUTO: 7.76 10*3/MM3 (ref 3.4–10.8)

## 2020-09-16 PROCEDURE — 86140 C-REACTIVE PROTEIN: CPT | Performed by: HOSPITALIST

## 2020-09-16 PROCEDURE — 85025 COMPLETE CBC W/AUTO DIFF WBC: CPT | Performed by: HOSPITALIST

## 2020-09-16 PROCEDURE — 25010000002 CYANOCOBALAMIN PER 1000 MCG: Performed by: INTERNAL MEDICINE

## 2020-09-16 PROCEDURE — 97530 THERAPEUTIC ACTIVITIES: CPT

## 2020-09-16 PROCEDURE — 25010000002 MORPHINE PER 10 MG: Performed by: HOSPITALIST

## 2020-09-16 PROCEDURE — 80053 COMPREHEN METABOLIC PANEL: CPT | Performed by: HOSPITALIST

## 2020-09-16 PROCEDURE — 93010 ELECTROCARDIOGRAM REPORT: CPT | Performed by: INTERNAL MEDICINE

## 2020-09-16 PROCEDURE — 93005 ELECTROCARDIOGRAM TRACING: CPT | Performed by: HOSPITALIST

## 2020-09-16 RX ORDER — CARVEDILOL 6.25 MG/1
3.12 TABLET ORAL 2 TIMES DAILY WITH MEALS
Qty: 30 TABLET | Refills: 0
Start: 2020-09-16 | End: 2020-10-16

## 2020-09-16 RX ADMIN — ATORVASTATIN CALCIUM 40 MG: 40 TABLET, FILM COATED ORAL at 10:00

## 2020-09-16 RX ADMIN — SODIUM CHLORIDE, PRESERVATIVE FREE 10 ML: 5 INJECTION INTRAVENOUS at 10:00

## 2020-09-16 RX ADMIN — Medication 100 MG: at 10:00

## 2020-09-16 RX ADMIN — POLYETHYLENE GLYCOL (3350) 17 G: 17 POWDER, FOR SOLUTION ORAL at 10:00

## 2020-09-16 RX ADMIN — SACUBITRIL AND VALSARTAN 1 TABLET: 97; 103 TABLET, FILM COATED ORAL at 10:00

## 2020-09-16 RX ADMIN — CLOPIDOGREL 75 MG: 75 TABLET, FILM COATED ORAL at 10:00

## 2020-09-16 RX ADMIN — CYANOCOBALAMIN 1000 MCG: 1000 INJECTION, SOLUTION INTRAMUSCULAR; SUBCUTANEOUS at 10:00

## 2020-09-16 RX ADMIN — TAMSULOSIN HYDROCHLORIDE 0.4 MG: 0.4 CAPSULE ORAL at 10:00

## 2020-09-16 RX ADMIN — HYDROCODONE BITARTRATE AND ACETAMINOPHEN 1 TABLET: 5; 325 TABLET ORAL at 11:01

## 2020-09-16 RX ADMIN — FINASTERIDE 5 MG: 5 TABLET, FILM COATED ORAL at 10:00

## 2020-09-16 RX ADMIN — SPIRONOLACTONE 25 MG: 25 TABLET ORAL at 10:00

## 2020-09-16 RX ADMIN — MORPHINE SULFATE 1 MG: 2 INJECTION, SOLUTION INTRAMUSCULAR; INTRAVENOUS at 01:50

## 2020-09-16 RX ADMIN — CEFDINIR 300 MG: 300 CAPSULE ORAL at 10:00

## 2020-09-16 RX ADMIN — APIXABAN 2.5 MG: 2.5 TABLET, FILM COATED ORAL at 10:00

## 2020-09-16 RX ADMIN — DOCUSATE SODIUM 50 MG AND SENNOSIDES 8.6 MG 2 TABLET: 8.6; 5 TABLET, FILM COATED ORAL at 10:00

## 2020-09-16 RX ADMIN — SODIUM CHLORIDE, PRESERVATIVE FREE 10 ML: 5 INJECTION INTRAVENOUS at 01:50

## 2020-09-16 RX ADMIN — SODIUM CHLORIDE, PRESERVATIVE FREE 10 ML: 5 INJECTION INTRAVENOUS at 01:51

## 2020-09-16 RX ADMIN — CARVEDILOL 3.12 MG: 3.12 TABLET, FILM COATED ORAL at 08:00

## 2020-09-16 NOTE — NURSING NOTE
Called [facility] ambulance service and cancelled the request for transport per Dr Delgado pt will be kept overnight r/t bradycardia

## 2020-09-16 NOTE — PLAN OF CARE
Problem: Skin Injury Risk Increased  Goal: Skin Health and Integrity  Outcome: Ongoing, Progressing  Intervention: Optimize Skin Protection  Description: Reassess skin injury risk and inspect skin frequently (e.g., scheduled interval, with turning, with change in condition) to provide optimal prevention.  Maintain adequate tissue perfusion (e.g., encourage fluid balance, avoid crossing legs, constrictive clothing or devices) to promote tissue oxygenation.  Maintain head of bed at lowest degree of elevation tolerated, considering medical condition and other restrictions. Limit amount of time head of bed is elevated greater than 30 degrees to prevent friction/shear injury.  Avoid positioning onto an area that remains reddened.  Minimize incontinence and moisture (e.g., toileting schedule; moisture-wicking pad, diaper or incontinence collection device, skin moisture barrier).  Cleanse skin promptly and gently when soiled utilizing a pH-balanced cleanser.  Relieve and redistribute pressure (e.g., schedule position changes; utilize higher specification foam mattress, chair cushion, constant low-pressure or alternating-pressure support surface; medical device repositioning; protective dressing applicatio  Support increased progressive functional activity (e.g., therapeutic exercise) to decrease risk associated with immobility. Balance rest with activity.  Flowsheets  Taken 9/16/2020 0511  Pressure Reduction Techniques:   frequent weight shift encouraged   pressure points protected   weight shift assistance provided  Skin Protection: adhesive use limited  Taken 9/15/2020 2105  Pressure Reduction Techniques: frequent weight shift encouraged  Pressure Reduction Devices: pressure-redistributing mattress utilized  Skin Protection: adhesive use limited  Intervention: Promote and Optimize Oral Intake  Description: Assess need and provide assistance with meal set-up and feeding.  Adjust diet or meal schedule based on  preferences and tolerance.  Minimize unnecessary dietary restrictions to increase oral intake.  Offer oral supplemental foods or drinks to enhance calorie and protein intake.  Establish bowel elimination program to increase comfort and appetite.  Provide and encourage oral hygiene to enhance desire to eat.  Consider nutrition support if oral intake remains inadequate.  Flowsheets (Taken 9/16/2020 0511)  Oral Nutrition Promotion: medicated   Goal Outcome Evaluation:  Plan of Care Reviewed With: patient  Progress: declining  Outcome Summary: pt. experiencing intense pain with R LE ROM.

## 2020-09-16 NOTE — DISCHARGE PLACEMENT REQUEST
"Laurel Galvez (87 y.o. Male)     Date of Birth Social Security Number Address Home Phone MRN    05/11/1933  [address]  [facility] [address] [phone] [id number]    Denominational Marital Status          [facility]        Admission Date Admission Type Admitting Provider Attending Provider Department, Room/Bed    9/11/20 Emergency Likens, Dwayne, DO Behbahani, Katayoun, MD [facility] PROGRESS CARE, P218/1P    Discharge Date Discharge Disposition Discharge Destination         Skilled Nursing Facility (DC - External)              Attending Provider: Behbahani, Katayoun, MD    Allergies: Penicillins    Isolation: Enh Drop/Con   Infection: COVID (confirmed) (09/15/20)   Code Status: CPR    Ht: 182.9 cm (72\")   Wt: 63 kg (139 lb)    Admission Cmt: None   Principal Problem: Closed fracture of neck of right femur (CMS/[facility]) [S72.001A] More...                 Active Insurance as of 9/11/2020     Primary Coverage     Payor Plan Insurance Group Employer/Plan Group    MEDICARE MEDICARE A & B      Payor Plan Address Payor Plan Phone Number Payor Plan Fax Number Effective Dates    [address] [phone]  5/1/1998 - None Entered    [facility] [id number]       Subscriber Name Subscriber Birth Date Member ID       LAUREL GALVEZ 5/11/1933 [id number]           Secondary Coverage     Payor Plan Insurance Group Employer/Plan Group    [place]      Payor Plan Address Payor Plan Phone Number Payor Plan Fax Number Effective Dates    [address] [phone]  12/13/2019 - None Entered    [facility] [id number]       Subscriber Name Subscriber Birth Date Member ID       LAUREL GALVEZ 5/11/1933 [id number]                 Emergency Contacts      (Rel.) Home Phone Work Phone Mobile Phone    Dawit (MARJ)Vj (Son) [phone] -- [phone]    KEISHA GALVEZ (Relative) -- -- [phone]            Emergency Contact Information     Name Relation Home Work Mobile    Dawit (POA)Vj " Son [phone]    KEISHA PASTOR Relative   [phone]          Insurance Information                MEDICARE/MEDICARE A & B Phone: [phone]    Subscriber: Demarcus Pastor Subscriber#: [id number]    Group#:  Precert#:         [place] Phone: [phone]    Subscriber: Demarcus Pastor Subscriber#: [id number]    Group#:  Precert#:           Treatment Team     Provider Relationship Specialty Contact    Behbahani, Katayoun, MD Attending, Physician of [facility] Internal Medicine  [phone]    Brenda Landeros, BREE Utilization Manager --  [phone]    Gustavo Araiza MD Consulting Physician, Surgeon Orthopedic Surgery  [phone]    Evelyn Cordero RN Registered Nurse --     Diana Adair Patient Care Technician --     Clemente Gabriel DO Consulting Physician Hospitalist  [phone]    Shruthi Rivers, RRT Respiratory Therapist --  [phone]    Maryjo Recinos, PCT Patient Care Technician --     Jhonatan Silverman PT Physical Therapist Physical Therapy     Penny Hi, RN Registered Nurse --           Problem List           Codes Noted - Resolved       Hospital    * (Principal) Closed fracture of neck of right femur (CMS/HCC) ICD-10-CM: S72.001A  ICD-9-CM: 820.8 9/11/2020 - Present       Non-Hospital    Anemia ICD-10-CM: D64.9  ICD-9-CM: 285.9 11/10/2016 - Present    Iron deficiency anemia ICD-10-CM: D50.9  ICD-9-CM: 280.9 11/8/2016 - Present    Anemia due to vitamin B12 deficiency ICD-10-CM: D51.9  ICD-9-CM: 281.1 11/8/2016 - Present    Hypokalemia ICD-10-CM: E87.6  ICD-9-CM: 276.8 11/8/2016 - Present    Late onset 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 (CMS/HCC) ICD-10-CM: G30.1, F02.80  ICD-9-CM: 331.0, 294.10 11/8/2016 - Present    Malnutrition (CMS/HCC) ICD-10-CM: E46  ICD-9-CM: 263.9 6/23/2016 - Present    Colitis ICD-10-CM: K52.9  ICD-9-CM: 558.9 6/19/2016 - Present    Abnormal CT of the abdomen ICD-10-CM: R93.5  ICD-9-CM: 793.6  "6/19/2016 - Present    Elevated LFTs ICD-10-CM: R79.89  ICD-9-CM: 790.6 6/16/2016 - Present    Dysphagia ICD-10-CM: R13.10  ICD-9-CM: 787.20 6/16/2016 - Present    Esophageal stricture ICD-10-CM: K22.2  ICD-9-CM: 530.3 6/15/2016 - Present    Hypertension ICD-10-CM: I10  ICD-9-CM: 401.9 6/15/2016 - Present    Chronic back pain ICD-10-CM: M54.9, G89.29  ICD-9-CM: 724.5, 338.29 6/15/2016 - Present    Weight loss ICD-10-CM: R63.4  ICD-9-CM: 783.21 6/15/2016 - Present    Dementia (CMS/HCC) ICD-10-CM: F03.90  ICD-9-CM: 294.20 6/15/2016 - Present             History & Physical      Clemente Gabriel DO at 09/11/20 1142              [facility] Medicine Services  HISTORY AND PHYSICAL    Primary Care Physician: Umm Butt MD    Subjective     Chief Complaint:    Chief Complaint   Patient presents with   • Leg Pain       History of Present Illness:   All of the HPI comes from notes in the chart, RN in ER, MD in ER, patient cannot provide any meaningful subjective data secondary to his inability to speak with words other than \"yes\" or \"no\".  These are also unreliable answers as well.  History of CVA, this expressive aphasia is likely a sequelae of the event.      Mr Pastor is an 87 year old nursing home resident who had been complaining of leg pain on the right side.  His PMH includes CVA, HLD, HTN, CHF, BPH, CAD, GERD who comes in with expressive aphasia and right leg pain.  Venous doppler in ER was negative for DVT.  Labs revealed an elevated total .   Xray right hip shows   IMPRESSION:  Complete transverse fracture through the femoral neck  Dr Araiza was notified via [facility], she saw patient in ER  Plans are for surgery tomorrow if medically cleared.       Review of Systems   Patient cannot provide reliable ROS 2/2 sequelae suffered after a stroke, namely  Expressive aphasia    1. Constitutional: Negative for fever. Negative for chills, diaphoresis, fatigue and unexpected " weight change.   2. HENT: Negative for congestion and hearing loss.   3. Eyes: Negative for redness and visual disturbance.   4. Respiratory: negative for shortness of breath. Negative for chest pain . Negative for cough and chest tightness.   5. Cardiovascular: Negative for chest pain and palpitations.   6. Gastrointestinal: Negative for abdominal distention, abdominal pain and blood in stool.   7. Endocrine: Negative for cold intolerance and heat intolerance.   8. Genitourinary: Negative for difficulty urinating, dysuria and frequency.   9. Musculoskeletal: + for right leg pain.   10. Skin: Negative for color change, rash and wound.   11. Neurological: Negative for syncope, weakness and headaches. + expressive aphasia  12. Hematological: Negative for adenopathy. Does not bruise/bleed easily.   13. Psychiatric/Behavioral: Negative for confusion. The patient is not nervous/anxious.     Past Medical History:   Past Medical History:   Diagnosis Date   • Abnormal CT of the abdomen 6/19/2016   • Alzheimer's disease with late onset (CMS/HCC)    • Anxiety    • Atelectasis    • Atherosclerotic heart disease    • Cardiomyopathy (CMS/HCC)    • Colitis 6/19/2016   • CVA (cerebral vascular accident) (CMS/HCC)    • Diverticulosis    • Dysphagia    • GERD (gastroesophageal reflux disease)    • Hemiplegia (CMS/HCC)    • History of transfusion    • Hyperlipidemia    • Hypertension    • Hypertension    • Hypokalemia    • Inguinal hernia    • Intracardiac thrombosis, not elsewhere classified    • Iron deficiency anemia    • Kidney stone    • Malnutrition (CMS/HCC)    • Muscle weakness    • Neuromuscular dysfunction of bladder    • Pancreatitis        Past Surgical History:  Past Surgical History:   Procedure Laterality Date   • COLONOSCOPY N/A 11/9/2016    Procedure: COLONOSCOPY CPT CODE: 49933;  Surgeon: Lilliam Wilson DO;  Location: [facility];  Service:    • ENDOSCOPY N/A 6/17/2016    Procedure: ESOPHAGOGASTRODUODENOSCOPY;   "Surgeon: Alex Robles III, MD;  Location: [facility];  Service:    • ENDOSCOPY N/A 11/9/2016    Procedure: ESOPHAGOGASTRODUODENOSCOPY WITH BIOPSY CPT CODE: 08843;  Surgeon: Lilliam Wilson DO;  Location: [facility];  Service:    • ESOPHAGEAL DILATATION         Family History: family history includes Heart disease in his brother; Stroke in his mother.    Social History:  reports that he has never smoked. He has never used smokeless tobacco. He reports that he does not drink alcohol or use drugs.    Medications:    (Not in a hospital admission)    Allergies:  Allergies   Allergen Reactions   • Penicillins Unknown - Low Severity         Objective     Physical Exam:  Vital Signs: /87   Pulse 60   Temp 98.7 °F (37.1 °C) (Oral)   Resp 16   Ht 182.9 cm (72\")   Wt 61.2 kg (135 lb)   SpO2 94%   BMI 18.31 kg/m²      General Appearance: alert, no acute distress. Basically nonverbal, can say yes or no, cannot rely upon validity  Skin: warm and dry.  HEENT: pupils round and reactive to light, oral mucosa normal.  Neck: supple, no JVD, trachea midline.  Lungs: CTA, unlabored breathing effort.  Heart: RRR, normal S1 and S2, no S3, no rub.  Abdomen: soft, non-tender, no palpable bladder, present bowel sounds to auscultation.  Extremities: no clubbing, cyanosis or edema.  Neuro: says yes or no only, expressive aphasia.          Results Reviewed:    Lab Results (last 24 hours)     Procedure Component Value Units Date/Time    COVID PRE-OP / PRE-PROCEDURE SCREENING ORDER (NO ISOLATION) - Swab, Nasal Cavity [[id number]] Collected:  09/11/20 1056    Specimen:  Swab from Nasal Cavity Updated:  09/11/20 1122    Narrative:       The following orders were created for panel order COVID PRE-OP / PRE-PROCEDURE SCREENING ORDER (NO ISOLATION) - Swab, Nasal Cavity.  Procedure                               Abnormality         Status                     ---------                               -----------         ------           "           COVID-19, ABBOTT IN-HOUS...[[id number]]  Normal              Final result                 Please view results for these tests on the individual orders.    COVID-19, ABBOTT IN-HOUSE,NP Swab (NO TRANSPORT MEDIA) 2 HR TAT - Swab, Nasal Cavity [[id number]]  (Normal) Collected:  09/11/20 1056    Specimen:  Swab from Nasal Cavity Updated:  09/11/20 1122     COVID19 Not Detected    Narrative:       Fact sheet for providers: https://www.fda.gov/media/419260/download     Fact sheet for patients: https://www.fda.gov/media/712879/download    Comprehensive Metabolic Panel [[id number]]  (Abnormal) Collected:  09/11/20 0609    Specimen:  Blood from Arm, Left Updated:  09/11/20 0636     Glucose 109 mg/dL      BUN 20 mg/dL      Creatinine 1.56 mg/dL      Sodium 136 mmol/L      Potassium 4.2 mmol/L      Chloride 103 mmol/L      CO2 24.7 mmol/L      Calcium 8.7 mg/dL      Total Protein 7.1 g/dL      Albumin 3.76 g/dL      ALT (SGPT) 16 U/L      AST (SGOT) 27 U/L      Alkaline Phosphatase 43 U/L      Total Bilirubin 1.3 mg/dL      eGFR Non African Amer 42 mL/min/1.73      Globulin 3.3 gm/dL      A/G Ratio 1.1 g/dL      BUN/Creatinine Ratio 12.8     Anion Gap 8.3 mmol/L     Narrative:       GFR Normal >60  Chronic Kidney Disease <60  Kidney Failure <15      CK [[id number]]  (Abnormal) Collected:  09/11/20 0609    Specimen:  Blood from Arm, Left Updated:  09/11/20 0636     Creatine Kinase 799 U/L     CBC & Differential [[id number]] Collected:  09/11/20 0609    Specimen:  Blood from Arm, Left Updated:  09/11/20 0616    Narrative:       The following orders were created for panel order CBC & Differential.  Procedure                               Abnormality         Status                     ---------                               -----------         ------                     CBC Auto Differential[[id number]]        Abnormal            Final result                 Please view results for these tests on the individual orders.    CBC  Auto Differential [[id number]]  (Abnormal) Collected:  09/11/20 0609    Specimen:  Blood from Arm, Left Updated:  09/11/20 0616     WBC 11.55 10*3/mm3      RBC 4.84 10*6/mm3      Hemoglobin 14.7 g/dL      Hematocrit 45.0 %      MCV 93.0 fL      MCH 30.4 pg      MCHC 32.7 g/dL      RDW 15.6 %      RDW-SD 53.5 fl      MPV 10.9 fL      Platelets 214 10*3/mm3      Neutrophil % 66.1 %      Lymphocyte % 19.4 %      Monocyte % 11.3 %      Eosinophil % 1.9 %      Basophil % 0.5 %      Immature Grans % 0.8 %      Neutrophils, Absolute 7.63 10*3/mm3      Lymphocytes, Absolute 2.24 10*3/mm3      Monocytes, Absolute 1.31 10*3/mm3      Eosinophils, Absolute 0.22 10*3/mm3      Basophils, Absolute 0.06 10*3/mm3      Immature Grans, Absolute 0.09 10*3/mm3      nRBC 0.3 /100 WBC           I have personally reviewed and interpreted available lab data, radiology studies and ECG obtained at time of admission.     Assessment / Plan     Assessment/Problem List:     Closed fracture of neck of right femur (CMS/[facility])          Plan:    Right femur fracture  Evaluated by ortho  Plans for surgery tomorrow if medically cleared by our team  Labs, EKG, imaging, ECHO in place    History of CVA  Continue statin  Neuro checks q 8 hrs  EKG reviewed for cardiogenic arrhthymias that could complicate precipitate stroke  ECHO ordered since EF reduced last year  11/2019 ECHO below  · The study is technically difficult for diagnosis.  · The quality of the study is limited due to poor acoustic windows related to limited views obtained(only apical)  · The left ventricular cavity is mildly dilated.  · The following left ventricular wall segments are akinetic: apical anterior, apical lateral, apical inferior, apical septal and apex.  · Left ventricular systolic function is moderately decreased  · Estimated EF appears to be in the range of 36 - 40%  · Left ventricular diastolic dysfunction (grade I) consistent with impaired relaxation.  · The aortic valve is not  well visualized. The aortic valve is grossly normal in structure. No aortic valve regurgitation is present. No aortic valve stenosis is present.  · Mitral valve is not well visualized. The mitral valve is grossly normal in structure. Mild mitral valve regurgitation is present. No significant mitral valve stenosis is present.  · There is no evidence of pericardial effusion.      CAD  Continue BB, statin, asa after surgery, Entresto   Would appreciate CARDS cardiac clearance with this patient    Cardiac diet until midnight, then NPO      Clemente Gabriel DO 09/11/20 11:42    Please note that portions of this note may have been completed with a voice recognition program. Efforts were made to edit the dictations, but occasionally words are mistranscribed.    Electronically signed by Clemente Gabriel DO at 09/11/20 1533       Prior to Admission Medications     Prescriptions Last Dose Informant Patient Reported? Taking?    aspirin 81 MG EC tablet 9/10/2020 Nursing Home Yes Yes    Take 81 mg by mouth Daily.    clopidogrel (PLAVIX) 75 MG tablet 9/10/2020 Nursing Home Yes Yes    Take 75 mg by mouth Daily.    cyanocobalamin 1000 MCG/ML injection 8/16/2020 Nursing Home Yes Yes    Inject 1,000 mcg into the appropriate muscle as directed by prescriber Every 30 (Thirty) Days.    finasteride (PROSCAR) 5 MG tablet 9/10/2020 Nursing Home Yes Yes    Take 5 mg by mouth Daily.    hydrOXYzine (ATARAX) 25 MG tablet 9/10/2020 Nursing Home Yes Yes    Take 25 mg by mouth Every Night.    pantoprazole (PROTONIX) 40 MG EC tablet 9/10/2020 Nursing Home Yes Yes    Take 40 mg by mouth Every Night.    sacubitril-valsartan (ENTRESTO)  MG tablet 9/10/2020 Nursing Home Yes Yes    Take 1 tablet by mouth 2 (Two) Times a Day.    spironolactone (ALDACTONE) 25 MG tablet 9/10/2020 Nursing Home Yes Yes    Take 12.5 mg by mouth Daily.    tamsulosin (FLOMAX) 0.4 MG capsule 24 hr capsule 9/10/2020 Nursing Home Yes Yes    Take 1 capsule by mouth Daily.     thiamine (VITAMIN B-1) 100 MG tablet 9/10/2020 Nursing Home Yes Yes    Take 100 mg by mouth Daily.    vitamin D (ERGOCALCIFEROL) 1.25 MG (42882 UT) capsule capsule 9/5/2020 Nursing Home Yes Yes    Take 50,000 Units by mouth 1 (One) Time Per Week.    atorvastatin (LIPITOR) 40 MG tablet 9/10/2020 Nursing Home Yes Yes    Take 40 mg by mouth Daily.    carvedilol (COREG) 6.25 MG tablet 9/10/2020 Nursing Home Yes Yes    Take 6.25 mg by mouth 2 (Two) Times a Day With Meals.    acetaminophen (TYLENOL) 500 MG tablet Unknown Nursing Home Yes No    Take 500 mg by mouth Every 4 (Four) Hours As Needed for Mild Pain .            Lab Results (last 24 hours)     Procedure Component Value Units Date/Time    Comprehensive Metabolic Panel [[id number]]  (Abnormal) Collected: 09/16/20 0031    Specimen: Blood Updated: 09/16/20 0155     Glucose 97 mg/dL      BUN 27 mg/dL      Creatinine 1.28 mg/dL      Sodium 139 mmol/L      Potassium 3.6 mmol/L      Chloride 110 mmol/L      CO2 19.6 mmol/L      Calcium 7.9 mg/dL      Total Protein 6.2 g/dL      Albumin 3.09 g/dL      ALT (SGPT) 25 U/L      AST (SGOT) 57 U/L      Alkaline Phosphatase 47 U/L      Total Bilirubin 0.6 mg/dL      eGFR Non African Amer 53 mL/min/1.73      Globulin 3.1 gm/dL      A/G Ratio 1.0 g/dL      BUN/Creatinine Ratio 21.1     Anion Gap 9.4 mmol/L     Narrative:      GFR Normal >60  Chronic Kidney Disease <60  Kidney Failure <15      C-reactive Protein [[id number]]  (Abnormal) Collected: 09/16/20 0031    Specimen: Blood Updated: 09/16/20 0152     C-Reactive Protein 9.43 mg/dL     CBC & Differential [[id number]]  (Abnormal) Collected: 09/16/20 0031    Specimen: Blood Updated: 09/16/20 0133    Narrative:      The following orders were created for panel order CBC & Differential.  Procedure                               Abnormality         Status                     ---------                               -----------         ------                     CBC Auto  Differential[[id number]]        Abnormal            Final result                 Please view results for these tests on the individual orders.    CBC Auto Differential [[id number]]  (Abnormal) Collected: 09/16/20 0031    Specimen: Blood Updated: 09/16/20 0133     WBC 7.76 10*3/mm3      RBC 3.76 10*6/mm3      Hemoglobin 11.2 g/dL      Hematocrit 36.9 %      MCV 98.1 fL      MCH 29.8 pg      MCHC 30.4 g/dL      RDW 16.0 %      RDW-SD 57.5 fl      MPV 10.6 fL      Platelets 279 10*3/mm3      Neutrophil % 45.2 %      Lymphocyte % 36.1 %      Monocyte % 15.7 %      Eosinophil % 0.9 %      Basophil % 0.8 %      Immature Grans % 1.3 %      Neutrophils, Absolute 3.51 10*3/mm3      Lymphocytes, Absolute 2.80 10*3/mm3      Monocytes, Absolute 1.22 10*3/mm3      Eosinophils, Absolute 0.07 10*3/mm3      Basophils, Absolute 0.06 10*3/mm3      Immature Grans, Absolute 0.10 10*3/mm3      nRBC 0.9 /100 WBC     COVID-19, ABBOTT IN-HOUSE,NP Swab (NO TRANSPORT MEDIA) 2 HR TAT - Swab, Nasopharynx [[id number]]  (Abnormal) Collected: 09/15/20 1653    Specimen: Swab from Nasopharynx Updated: 09/15/20 1742     COVID19 Detected    Narrative:      Fact sheet for providers: https://www.fda.gov/media/882982/download     Fact sheet for patients: https://www.fda.gov/media/991752/download    COVID-19,CEPHEID,COR/ANJUM/PAD IN-HOUSE(OR EMERGENT/ADD-ON),NP SWAB IN TRANSPORT MEDIA 3-4 HR TAT - Swab, Nasopharynx [[id number]]  (Abnormal) Collected: 09/15/20 1107    Specimen: Swab from Nasopharynx Updated: 09/15/20 1207     COVID19 Detected    Narrative:      Fact sheet for providers: https://www.fda.gov/media/004085/download     Fact sheet for patients: https://www.fda.gov/media/114482/download    Urine Culture - Urine, Urine, Random Void [[id number]]  (Abnormal) Collected: 09/14/20 1101    Specimen: Urine, Random Void Updated: 09/15/20 1031     Urine Culture <10,000 CFU/mL Gram Negative Bacilli    Narrative:      Call if further workup needed.               Referral Orders (last 24 hours) (24h ago, onward)    None        Consult Orders (last 24 hours) (24h ago, onward)    None        Physician Progress Notes (last 24 hours) (Notes from 09/15/20 0934 through 20)    No notes of this type exist for this encounter.         Consult Notes (last 24 hours) (Notes from 09/15/20 0934 through 20)    No notes of this type exist for this encounter.            Physical Therapy Notes (most recent note)      Crystal Cordero, PT at 20  Version 1 of 1         Acute Care - Physical Therapy Treatment Note   [place]     Patient Name: Demarcus Pastor  : 1933  MRN: [id number]  Today's Date: 2020   Onset of Illness/Injury or Date of Surgery: 20       PT Assessment (last 12 hours)      PT Evaluation and Treatment     Row Name 20          Physical Therapy Time and Intention    Subjective Information  complains of;pain  -AG     Document Type  therapy note (daily note)  -AG     Mode of Treatment  individual therapy;physical therapy  -AG     Patient Effort  poor  -AG     Row Name 20 145          Pain    Additional Documentation  Pain Scale: FACES Pre/Post-Treatment (Group)  -AG     Row Name 20          Pain Scale: FACES Pre/Post-Treatment    Pain: FACES Scale, Pretreatment  2-->hurts little bit  -AG     Posttreatment Pain Rating  6-->hurts even more  -AG     Pain Location - Side  Right  -AG     Pain Location  hip  -AG     Row Name 20          Bed Mobility    Bed Mobility  rolling left;rolling right;scooting/bridging  -AG     Rolling Left [place] (Bed Mobility)  dependent (less than 25% patient effort)  -AG     Rolling Right [place] (Bed Mobility)  dependent (less than 25% patient effort)  -AG     Scooting/Bridging [place] (Bed Mobility)  dependent (less than 25% patient effort)  -AG     Row Name 20          Transfers    Comment (Transfers)  deferred d/t dependent status  -AG      Row Name 09/14/20 1450          Gait/Stairs (Locomotion)    [place] Level (Gait)  unable to assess  -     Row Name 09/14/20 1450          Safety Issues, Functional Mobility    Safety Issues Affecting Function (Mobility)  insight into deficits/self-awareness;safety precaution awareness;safety precautions follow-through/compliance  -     Impairments Affecting Function (Mobility)  range of motion (ROM);strength  -     Row Name 09/14/20 1450          Balance    Static Standing Balance  unable to perform activity  -     Row Name 09/14/20 1450          Motor Skills    Therapeutic Exercise  hip;knee;ankle  -     Row Name 09/14/20 1450          Hip (Therapeutic Exercise)    Hip (Therapeutic Exercise)  AAROM (active assistive range of motion);PROM (passive range of motion)  -     Hip AAROM (Therapeutic Exercise)  right;flexion;extension;aBduction;aDduction;supine;10 repetitions;2 sets  -[facility] Name 09/14/20 1450          Knee (Therapeutic Exercise)    Knee (Therapeutic Exercise)  AROM (active range of motion);AAROM (active assistive range of motion)  -     Knee AROM (Therapeutic Exercise)  right;flexion;extension;supine;10 repetitions;2 sets  -[facility] Name 09/14/20 1450          Ankle (Therapeutic Exercise)    Ankle (Therapeutic Exercise)  AROM (active range of motion)  -     Ankle AROM (Therapeutic Exercise)  dorsiflexion;plantarflexion;supine;10 repetitions;2 sets  -     Row Name             Wound 09/12/20 0958 Right hip Incision    Wound - Properties Group Placement Date: 09/12/20  - Placement Time: 0958 -JH Present on Hospital Admission: N  - Side: Right  - Location: hip  - Primary Wound Type: Incision  -JH, s/p bipolar hip replacement  Additional Comments: staples,adaptic, 4x4s, abd pad, tape- drsg is c/d/i  -J[facility]    Retired Wound - Properties Group Date first assessed: 09/12/20  - Time first assessed: 0958 - Present on Hospital Admission: N  -J[facility] Side: Right  - Location: hip   - Primary Wound Type: Incision  -, s/p bipolar hip replacement  Additional Comments: staples,adaptic, 4x4s, abd pad, tape- drsg is c/d/i  -    Row Name 09/14/20 1450          Coping    Observed Emotional State  anxious;cooperative  -     Verbalized Emotional State  acceptance  -     Row Name 09/14/20 1450          Plan of Care Review    Plan of Care Reviewed With  patient  -AG     Progress  no change  -AG     Outcome Summary  pt. experiencing intense pain with R LE ROM.   -     Row Name 09/14/20 1450          Positioning and Restraints    Pre-Treatment Position  in bed  -AG     Post Treatment Position  bed  -AG     In Bed  supine;call light within reach;encouraged to call for assist;side rails up x3;ABD pillow  -     Row Name 09/14/20 1450          Progress Summary (PT)    Progress Toward Functional Goals (PT)  unable to show any progress toward functional goals  -     Barriers to Overall Progress (PT)  pain  -     Row Name 09/14/20 1450          Therapy Plan Review/Discharge Plan (PT)    Therapy Plan Review (PT)  care plan/treatment goals reviewed;risks/benefits reviewed;participants included;patient  -AG       User Key  (r) = Recorded By, (t) = Taken By, (c) = Cosigned By    Initials Name Provider Type     Crystal Cordero, PT Physical Therapist    WOODY Mckeon, Mariola Bunch, RN Registered Nurse        Physical Therapy Education                 Title: PT OT SLP Therapies (In Progress)     Topic: Physical Therapy (In Progress)     Point: Mobility training (In Progress)     Learning Progress Summary           Patient Acceptance, E,D, NR by  at 9/14/2020 1449    Comment: reviewed SUSIE precautions and importance of mobilization.    Acceptance, E, VU by  at 9/13/2020 1139    Acceptance, E,D, VU,NR by  at 9/12/2020 1431    Comment: instructed in purpose and intention of PT, importance of mobilization; instructed in hip precautions.                   Point: Home exercise program (In Progress)      Learning Progress Summary           Patient Acceptance, E,D, NR by AG at 9/14/2020 1449    Comment: reviewed SUSIE precautions and importance of mobilization.    Acceptance, E, VU by HN at 9/13/2020 1139    Acceptance, E,D, VU,NR by AG at 9/12/2020 1431    Comment: instructed in purpose and intention of PT, importance of mobilization; instructed in hip precautions.                   Point: Body mechanics (In Progress)     Learning Progress Summary           Patient Acceptance, E,D, NR by AG at 9/14/2020 1449    Comment: reviewed SUSIE precautions and importance of mobilization.    Acceptance, E, VU by HN at 9/13/2020 1139    Acceptance, E,D, VU,NR by AG at 9/12/2020 1431    Comment: instructed in purpose and intention of PT, importance of mobilization; instructed in hip precautions.                   Point: Precautions (In Progress)     Learning Progress Summary           Patient Acceptance, E,D, NR by AG at 9/14/2020 1449    Comment: reviewed SUSIE precautions and importance of mobilization.    Acceptance, E, VU by HN at 9/13/2020 1139    Acceptance, E,D, VU,NR by AG at 9/12/2020 1431    Comment: instructed in purpose and intention of PT, importance of mobilization; instructed in hip precautions.                               User Key     Initials Effective Dates Name Provider Type Discipline     04/03/18 -  Crystal Cordero, PT Physical Therapist PT     01/30/20 -  Evelyn Santiago, RN Registered Nurse Nurse              PT Recommendation and Plan  Anticipated Discharge Disposition (PT): skilled nursing facility  Planned Therapy Interventions (PT): balance training, bed mobility training, gait training, patient/family education, postural re-education, ROM (range of motion), strengthening, transfer training(gait training if appropriate)  Therapy Frequency (PT): other (see comments)(6 days/ week; 1-2 times/ day)  Progress Summary (PT)  Progress Toward Functional Goals (PT): unable to show any progress toward functional  goals  Barriers to Overall Progress (PT): pain  Plan of Care Reviewed With: patient  Progress: no change  Outcome Summary: pt. experiencing intense pain with R LE ROM.        Time Calculation:   PT Charges     Row Name 20 1450             Time Calculation    PT Received On  20  -AG         Time Calculation- PT    TCU Minutes- PT  15 min  -AG        User Key  (r) = Recorded By, (t) = Taken By, (c) = Cosigned By    Initials Name Provider Type     Crystal Cordero, PT Physical Therapist        Therapy Charges for Today     Code Description Service Date Service Provider Modifiers Qty    32306113095  PT THER PROC EA 15 MIN 2020 Crystal Cordero, PT GP 1               Crystal Cordero PT  2020      Electronically signed by Crystal Cordero, PT at 20 8656              Speech Language Pathology Notes (most recent note)      Abida Robles MA,CCC-SLP at 09/15/20 1109          Acute Care - Speech Language Pathology   Swallow Re-Assessment  Drew     Patient Name: Demarcus Pastor  : 1933  MRN: [id number]  Today's Date: 9/15/2020  Onset of Illness/Injury or Date of Surgery: 20     Referring Physician: Dr. Araiza      Admit Date: 2020    Visit Dx:     ICD-10-CM ICD-9-CM   1. Closed fracture of neck of right femur, initial encounter (CMS/[facility])  S72.001A 820.8   2. 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 (CMS/[facility])   • Anemia   • Closed fracture of neck of right femur (CMS/[facility])     Past Medical History:   Diagnosis Date   • Abnormal CT of the abdomen 2016   •  Alzheimer's disease with late onset (CMS/HCC)    • Anxiety    • Atelectasis    • Atherosclerotic heart disease    • Cardiomyopathy (CMS/HCC)    • Colitis 6/19/2016   • CVA (cerebral vascular accident) (CMS/HCC)    • Diverticulosis    • Dysphagia    • GERD (gastroesophageal reflux disease)    • Hemiplegia (CMS/HCC)    • History of transfusion    • Hyperlipidemia    • Hypertension    • Hypertension    • Hypokalemia    • Inguinal hernia    • Intracardiac thrombosis, not elsewhere classified    • Iron deficiency anemia    • Kidney stone    • Malnutrition (CMS/HCC)    • Muscle weakness    • Neuromuscular dysfunction of bladder    • Pancreatitis      Past Surgical History:   Procedure Laterality Date   • COLONOSCOPY N/A 11/9/2016    Procedure: COLONOSCOPY CPT CODE: 44779;  Surgeon: Lilliam Wilson DO;  Location:  [facility];  Service:    • ENDOSCOPY N/A 6/17/2016    Procedure: ESOPHAGOGASTRODUODENOSCOPY;  Surgeon: Alex Robles III, MD;  Location:  [facility];  Service:    • ENDOSCOPY N/A 11/9/2016    Procedure: ESOPHAGOGASTRODUODENOSCOPY WITH BIOPSY CPT CODE: 07925;  Surgeon: Lilliam Wilson DO;  Location:  [facility];  Service:    • ESOPHAGEAL DILATATION       Mr. Pastor is seen at bedside this am on 3S for diet safety/assessment, f/u from [facility] 9/14/20 w/ recommendation for premorbid baseline modified po diet of puree consistency, nectar thick liquids.     He is a/a this am, upright and centered in bed. PCA is present for part of this assessment. Mr. Pastor accepts approximately 2 ounces of nectar thick juice via cup and straw, applesauce presentations x5. No overt s/s aspiration evidenced, no silent aspiration suspected as he is w/o changes in vocal quality, respirations or secretions post po presentations.     Impression: Per this assessment, Mr. Pastor is accepting his premorbid baseline modified po diet of puree consistency, nectar thick liquids w/o overt s/s aspiration. Recommend continuing current diet, 1:1  feeding assistance.     EDUCATION  The patient has been educated in the following areas:   Dysphagia (Swallowing Impairment) Modified Diet Instruction.    SLP Recommendation and Plan  1. Puree consistency, NECTAR thick liquids.   2. Meds crushed in puree.   3. Universal aspiration precautions.   4. BRISA precautions.   5. Oral care protocol.   6. 1:1 feeding assistance.   No further formal SLP f/u warranted at this time.     Thank you-  Abida Robles M.A., CCC-SLP     Time Calculation:       Therapy Charges for Today     Code Description Service Date Service Provider Modifiers Qty    50913952557 HC ST MOTION FLUORO EVAL SWALLOW 8 2020 Abida Robles MA,CCC-SLP GN 1    96280132468 HC ST EVAL ORAL PHARYNG SWALLOW 2 9/15/2020 Abida Robles MA,CCC-SLP GN 1               Abida Robles MA,CCC-SLP  9/15/2020    Electronically signed by Abida Robles MA,CCC-SLP at 09/15/20 1113                  Discharge Summary      Behbahani, Katayoun, MD at 09/15/20 1110              [facility] Medicine Services  DISCHARGE SUMMARY    Patient Identification:  Name:  Demarcus Pastor  Age:  87 y.o.  Sex:  male  :  1933  MRN:  [id number]  Visit Number:  [id number]    Date of Admission: 2020  Date of Discharge:  2020    PCP: Umm Butt MD      Admission/Discharge Diagnoses     Discharge Diagnoses:  · Femur femoral neck fracture  · Possible UTI  · Constipation  · Chronic medical conditions  · History of CVA with aphasia and right hemiparesis  · CAD  · Mild rhabdomyelisis  · Incidental COVID-19 positive on surveillance screening prior to d/c. asymptomatic    Chronic systolic congestive heart failure EF 36 to 40%  · Iron deficiency anemia  · Hyperlipidemia  · BPH  · GERD  · Insomnia  · B12 deficiency  · CKD 3    Consults/Procedures     Consults:   Consults     Date and Time Order Name Status Description    2020 1154 Inpatient Cardiology Consult  Completed     9/11/2020 1132 Inpatient Orthopedic Surgery Consult Completed     9/11/2020 0732 Ortho (on-call MD unless specified) Completed     9/11/2020 0731 Hospitalist (on-call MD unless specified) Completed           Procedures Performed:  Procedure(s):  HIP BIPOLAR REPLACEMENT       History of Presenting Illness     Mr. Pastor is an 87-year-old male resident of a nursing home with complaint of right hip pain.  On arrival to the emergency room work-up showed 8 transverse fractures through the femoral neck.  Unclear if patient had a fall recently at nursing home.  He also has a history of CVA with dementia and expressive aphasia. He was not able to provide history.    Hospital Course     Orthopedic surgery, Dr Araiza, was consulted on admission and performed right hip bipolar arthroplasty on 9/12.  He has been doing well with physical therapy here.  He was noted to have some abdominal distention and tenderness yesterday.  KUB showed large stool burden.  He was given aggressive bowel regimen which resulted to several bowel movements last night and this morning.  His constipation has been resolved.  I have added some bowel regimen to prevent future constipations.  He can continue therapy upon return back to nursing home.  Orthopedic surgery recommends dressing changes daily and turgor no drainage noted on dressing then they can leave to air.  He will need follow-up with orthopedic surgery in 2 weeks.  They recommend DVT prophylaxis with low-dose apixaban for 35 days postoperatively.  Please monitor for any signs of bleeding.    UA was obtained yesterday due to slight leukocytosis and was found to have elevated WBC.  Urine was sent for culture which is currently growing gram-negative emeka.  Culture is not finalized.  I have started him on Rocephin x1 dose yesterday.  I have transitioned this to cefdinir to complete additional 6 days to complete a week course.  Please follow-up on final results of urine culture to  assure current antibiotic is adequate.    (Note patient was found to have positive COVID on surveillance testing before discharge today.  This was confirmed with an Abbott test.  Patient remains asymptomatic.  This was communicated with nursing home and they are going to quarantine him upon arrival to nursing home and continue to monitor his symptoms).  Discharge Vitals/Physical Examination     Vital Signs:  Temp:  [97.6 °F (36.4 °C)-100.5 °F (38.1 °C)] 98.4 °F (36.9 °C)  Heart Rate:  [44-65] 61  Resp:  [15-20] 20  BP: ()/(50-78) 120/68  Mean Arterial Pressure (Non-Invasive) for the past 24 hrs (Last 3 readings):   Noninvasive MAP (mmHg)   09/16/20 0420 89   09/16/20 0305 95   09/16/20 0105 72     SpO2 Percentage    09/16/20 0105 09/16/20 0305 09/16/20 0420   SpO2: 96% 97% 96%     SpO2:  [83 %-100 %] 96 %  on  Flow (L/min):  [2] 2;   Device (Oxygen Therapy): room air    Body mass index is 18.85 kg/m².  Wt Readings from Last 3 Encounters:   09/16/20 63 kg (139 lb)   08/07/20 51.7 kg (114 lb)   12/19/19 52.6 kg (116 lb)         Physical Exam:  GEN: NAD  CV: RRR, no audible murmur  PULM: CTA, no wheeze or crackles  GI: +bs, soft and NT  SKIN: no edema, incision dry and intact.     Pertinent Laboratory/Radiology Results     Pertinent Laboratory Results:  Results from last 7 days   Lab Units 09/11/20  0609   CK TOTAL U/L 799*           Results from last 7 days   Lab Units 09/16/20  0031 09/14/20  0249 09/13/20  0349   CRP mg/dL 9.43*  --   --    WBC 10*3/mm3 7.76 12.20* 12.16*   HEMOGLOBIN g/dL 11.2* 11.5* 13.7   HEMATOCRIT % 36.9* 36.5* 44.2   MCV fL 98.1* 96.3 96.5   MCHC g/dL 30.4* 31.5 31.0*   PLATELETS 10*3/mm3 279 225 235     Results from last 7 days   Lab Units 09/16/20  0031 09/14/20  0958 09/14/20  0249 09/13/20  0349  09/11/20  0609   SODIUM mmol/L 139 136 128* 137   < > 136   POTASSIUM mmol/L 3.6  --  4.0 4.8   < > 4.2   CHLORIDE mmol/L 110*  --  96* 108*   < > 103   CO2 mmol/L 19.6*  --  20.3* 19.4*   <  > 24.7   BUN mg/dL 27*  --  27* 30*   < > 20   CREATININE mg/dL 1.28*  --  1.19 1.37*   < > 1.56*   EGFR IF NONAFRICN AM mL/min/1.73 53*  --  58* 49*   < > 42*   CALCIUM mg/dL 7.9*  --  7.5* 7.9*   < > 8.7   GLUCOSE mg/dL 97  --  111* 161*   < > 109*   ALBUMIN g/dL 3.09*  --   --   --   --  3.76   BILIRUBIN mg/dL 0.6  --   --   --   --  1.3*   ALK PHOS U/L 47  --   --   --   --  43   AST (SGOT) U/L 57*  --   --   --   --  27   ALT (SGPT) U/L 25  --   --   --   --  16    < > = values in this interval not displayed.   Estimated Creatinine Clearance: 36.3 mL/min (A) (by C-G formula based on SCr of 1.28 mg/dL (H)).  No results found for: AMMONIA    No results found for: HGBA1C, POCGLU  No results found for: HGBA1C  Lab Results   Component Value Date    TSH 5.230 (H) 11/10/2019    FREET4 0.98 11/11/2019       No results found for: BLOODCX  Urine Culture   Date Value Ref Range Status   09/14/2020 <10,000 CFU/mL Gram Negative Bacilli (A)  Final        Microbiology Results (last 10 days)     Procedure Component Value - Date/Time    COVID-19, ABBOTT IN-HOUSE,NP Swab (NO TRANSPORT MEDIA) 2 HR TAT - Swab, Nasopharynx [[id number]]  (Abnormal) Collected: 09/15/20 1653    Lab Status: Final result Specimen: Swab from Nasopharynx Updated: 09/15/20 1742     COVID19 Detected    Narrative:      Fact sheet for providers: https://www.fda.gov/media/184157/download     Fact sheet for patients: https://www.fda.gov/media/299668/download    COVID-19,CEPHEID,[facility]/ANJUM/PAD IN-HOUSE(OR EMERGENT/ADD-ON),NP SWAB IN TRANSPORT MEDIA 3-4 HR TAT - Swab, Nasopharynx [[id number]]  (Abnormal) Collected: 09/15/20 1107    Lab Status: Final result Specimen: Swab from Nasopharynx Updated: 09/15/20 1207     COVID19 Detected    Narrative:      Fact sheet for providers: https://www.fda.gov/media/336702/download     Fact sheet for patients: https://www.fda.gov/media/510656/download    Urine Culture - Urine, Urine, Random Void [[id number]]  (Abnormal) Collected:  09/14/20 1101    Lab Status: Final result Specimen: Urine, Random Void Updated: 09/15/20 1031     Urine Culture <10,000 CFU/mL Gram Negative Bacilli    Narrative:      Call if further workup needed.      COVID PRE-OP / PRE-PROCEDURE SCREENING ORDER (NO ISOLATION) - Swab, Nasal Cavity [[id number]]  (Normal) Collected: 09/11/20 1056    Lab Status: Final result Specimen: Swab from Nasal Cavity Updated: 09/11/20 1122    Narrative:      The following orders were created for panel order COVID PRE-OP / PRE-PROCEDURE SCREENING ORDER (NO ISOLATION) - Swab, Nasal Cavity.  Procedure                               Abnormality         Status                     ---------                               -----------         ------                     COVID-19, ABBOTT IN-HOUS...[[id number]]  Normal              Final result                 Please view results for these tests on the individual orders.    COVID-19, ABBOTT IN-HOUSE,NP Swab (NO TRANSPORT MEDIA) 2 HR TAT - Swab, Nasal Cavity [[id number]]  (Normal) Collected: 09/11/20 1056    Lab Status: Final result Specimen: Swab from Nasal Cavity Updated: 09/11/20 1122     COVID19 Not Detected    Narrative:      Fact sheet for providers: https://www.fda.gov/media/341179/download     Fact sheet for patients: https://www.fda.gov/media/964480/download        Pain Management Panel     Pain Management Panel Latest Ref Rng & Units 1/13/2014    AMPHETAMINES SCREEN, URINE NEGATIVE Negative    BARBITURATES SCREEN NEGATIVE Negative    BENZODIAZEPINE SCREEN, URINE NEGATIVE Negative    COCAINE SCREEN, URINE NEGATIVE Negative    METHADONE SCREEN, URINE NEGATIVE Negative          Pertinent Radiology Results:  Imaging Results (All)     Procedure Component Value Units Date/Time    FL Video Swallow Single Contrast [[id number]] Collected: 09/14/20 1407     Updated: 09/14/20 1414    Narrative:      FL VIDEO SWALLOW SINGLE-CONTRAST-     CLINICAL INDICATION: aspiration; S72.001A-Fracture of unspecified part  of  neck of right femur, initial encounter for closed fracture;  Z86.73-Personal history of transient ischemic attack (TIA), and cerebral  infarction without residual deficits        TECHNIQUE:   Speech therapy service was present. The patient was examined in the  sitting lateral position and was given several consistencies of barium.     FINDINGS: Aspiration with thin liquids     FLUOROSCOPY TIME: 0.6 minutes     Images: Cine loop was acquired       Impression:      Aspiration with thin liquids     For additional information please see the report provided by the speech  therapy service     This report was finalized on 9/14/2020 2:11 PM by Dr. Paul Aranda MD.       XR Abdomen KUB [[id number]] Collected: 09/14/20 1236     Updated: 09/14/20 1257    Narrative:      EXAMINATION: XR ABDOMEN KUB-      CLINICAL INDICATION: distention and pain; S72.001A-Fracture of  unspecified part of neck of right femur, initial encounter for closed  fracture; Z86.73-Personal history of transient ischemic attack (TIA),  and cerebral infarction without residual deficits        COMPARISON: 11/12/2019     FINDINGS: One view of the abdomen shows interval placement of a right  hip prosthesis. Overlying skin clips are present.     Moderate to large volume stool in the colon.       Impression:      1. Postoperative right hip prosthesis.  2. Moderate to large volume stool.      This report was finalized on 9/14/2020 12:55 PM by Dr. Paul Aranda MD.       XR Hip 1 View Without Pelvis Right (Surgery Only) [[id number]] Collected: 09/12/20 1104     Updated: 09/12/20 1106    Narrative:      POSTOP RIGHT HIP, 9/12/2020      HISTORY:    Postop surgery for right femoral neck fracture.      TECHNIQUE:  Single AP radiograph of the right hip was obtained portably following surgery.      FINDINGS:  Right proximal femoral endoprosthesis appears well positioned and well seated. No visible periprosthetic fracture or joint dislocation.      Impression:       Satisfactory postoperative appearance following right hip arthroplasty.    Signer Name: Jose Shah MD   Signed: 9/12/2020 11:04 AM   Workstation Name: [email or web address]    Radiology [facility]    XR Femur 2 View Right [[id number]] Collected: 09/11/20 0743     Updated: 09/11/20 0745    Narrative:      CR Femur 2 Vws RT    INDICATION:   Right hip pain    COMPARISON:   Hip series 12/19/2019    FINDINGS:   2 views of the right femur.  There is a complete transverse fracture through the femoral neck. There is slight superior movement of the shaft as relates to the head. Rest the femur is intact.  No bone erosion or destruction. Articulation at the hip and  knee is anatomic.  No foreign body.      Impression:      Complete transverse fracture through the femoral neck    Signer Name: Oleg Garcia MD   Signed: 9/11/2020 7:43 AM   Workstation Name: [facility]    Radiology [facility]    XR Hip With or Without Pelvis 2 - 3 View Right [[id number]] Collected: 09/11/20 0742     Updated: 09/11/20 0744    Narrative:      CR Hip Uni Comp Min 2 Vws RT    INDICATION:   Right hip pain today    COMPARISON:   12/19/2019    FINDINGS:  AP and frog-leg lateral view(s) of the right hip.  There is a complete transverse fracture through the femoral neck. There seems be some resorption of the femoral neck and part of the head with perhaps a pseudojoint formation. The bones of the pelvis are  normal. No bone erosion or destruction.        Impression:      Complete fracture through the femoral neck. There may be some or destruction of bone from the head and neck suggesting this is not an acute finding. This was not present 12/19/2019    Signer Name: Oleg Garcia MD   Signed: 9/11/2020 7:42 AM   Workstation [facility]: [facility]    Radiology [facility]          Test Results Pending at Discharge:      Discharge Disposition/Discharge Medications/Discharge Appointments     Discharge Disposition:   Nursing  home    Condition at Discharge:  Stable     DME Prescribed at Discharge:  As per NH    Discharge Diet:  Diet Instructions     Diet: Dysphagia; Nectar / Syrup Thick Liquids; Pureed      Discharge Diet: Dysphagia    Fluid Consistency: Nectar / Syrup Thick Liquids    Pureed Options: Pureed    Diet: Regular      Discharge Diet: Regular          Discharge Activity:  Activity Instructions     Activity as Tolerated      Other Activity Instructions      Activity Instructions: per PT staff and director of nursing home rehab care          Code Status While Inpatient:  Code Status and Medical Interventions:   Ordered at: 09/11/20 1135     Level Of Support Discussed With:    Patient     Code Status:    CPR     Medical Interventions (Level of Support Prior to Arrest):    Full       Discharge Medications:     Discharge Medications      New Medications      Instructions Start Date   apixaban 2.5 MG tablet tablet  Commonly known as: ELIQUIS   2.5 mg, Oral, Every 12 Hours Scheduled      cefdinir 300 MG capsule  Commonly known as: OMNICEF   300 mg, Oral, Every 12 Hours Scheduled      ondansetron 4 MG tablet  Commonly known as: ZOFRAN   4 mg, Oral, Every 6 Hours PRN      polyethylene glycol 17 g packet  Commonly known as: MIRALAX   17 g, Oral, Daily      sennosides-docusate 8.6-50 MG per tablet  Commonly known as: PERICOLACE   2 tablets, Oral, 2 Times Daily         Changes to Medications      Instructions Start Date   carvedilol 6.25 MG tablet  Commonly known as: COREG  What changed: how much to take   3.125 mg, Oral, 2 Times Daily With Meals         Continue These Medications      Instructions Start Date   acetaminophen 500 MG tablet  Commonly known as: TYLENOL   500 mg, Oral, Every 4 Hours PRN      aspirin 81 MG EC tablet   81 mg, Oral, Daily      atorvastatin 40 MG tablet  Commonly known as: LIPITOR   40 mg, Oral, Daily      clopidogrel 75 MG tablet  Commonly known as: PLAVIX   75 mg, Oral, Daily      cyanocobalamin 1000 MCG/ML  injection   1,000 mcg, Intramuscular, Every 30 Days      finasteride 5 MG tablet  Commonly known as: PROSCAR   5 mg, Oral, Daily      hydrOXYzine 25 MG tablet  Commonly known as: ATARAX   25 mg, Oral, Nightly      pantoprazole 40 MG EC tablet  Commonly known as: PROTONIX   40 mg, Oral, Nightly      sacubitril-valsartan  MG tablet  Commonly known as: ENTRESTO   1 tablet, Oral, 2 Times Daily      spironolactone 25 MG tablet  Commonly known as: ALDACTONE   12.5 mg, Oral, Daily      tamsulosin 0.4 MG capsule 24 hr capsule  Commonly known as: FLOMAX   1 capsule, Oral, Daily      thiamine 100 MG tablet  Commonly known as: VITAMIN B-1   100 mg, Oral, Daily      vitamin D 1.25 MG (91077 UT) capsule capsule  Commonly known as: ERGOCALCIFEROL   50,000 Units, Oral, Weekly             Discharge Appointments:      Additional Instructions for the Follow-ups that You Need to Schedule     Discharge Follow-up with Specified Provider: Dr Araiza for staple removal and follow up; 2 Weeks   As directed      To: Dr Araiza for staple removal and follow up    Follow Up: 2 Weeks         Discharge Follow-up with Specified Provider: Richard; 2 Weeks   As directed      To: Richard    Follow Up: 2 Weeks              Katayoun Behbahani, MD  Hospitalist Service -- [facility]       09/16/20  07:36 EDT    Discharge Time: < 30 minutes    Of note, patient did not get transportation in time and was not able to leave yesterday.  He was noted to be slightly bradycardic in the 40's at night after coreg dose with one low BP that recovered spontaneously. This morning his HR is 61 (as it has been the entire hospital stay), / 68, satting 96% on RA. He remains asymptomatic from COVID. He was also asymptomatic when bradycardic in the 40's. I recommend to decrease home coreg dose to 3.125 mg BID. He is ready for discharge today. He will need to continue Apixaban for 35 days post op for DVT PPX from hip fracture.     Electronically signed  by Behbahani, Katayoun, MD at 09/16/20 0740       Discharge Order (From admission, onward)     Start     Ordered    09/16/20 0736  Discharge patient  Once     Expected Discharge Date: 09/16/20    Expected Discharge Time: Morning    Discharge Disposition: Skilled Nursing Facility (DC - External)    Physician of Record for Attribution - Please select from Treatment Team: BEHBAHANI, KATAYOUN [[id number]]    Review needed by CMO to determine Physician of Record: No    Please choose which facility the patient is currently admitted if they are being discharged to another facility or unit.: [facility]       Question Answer Comment   Physician of Record for Attribution - Please select from Treatment Team BEHBAHANI, KATAYOUN    Review needed by CMO to determine Physician of Record No    Please choose which facility the patient is currently admitted if they are being discharged to another facility or unit. [facility]        09/16/20 0735    09/13/20 1143  Discharge patient  Once,   Status:  Canceled     Comments: Back to his nursing home   Expected Discharge Date: 09/13/20    Expected Discharge Time: Midday    Discharge Disposition: Home or Self Care    Physician of Record for Attribution - Please select from Treatment Team: WERNER LUTZ [[id number]]    Review needed by CMO to determine Physician of Record: No       Question Answer Comment   Physician of Record for Attribution - Please select from Treatment Team WERNER LUTZ    Review needed by CMO to determine Physician of Record No        09/13/20 1148

## 2020-09-16 NOTE — NURSING NOTE
Per call with [facility] ambulance service dispatch the [facility] is awaiting a  to arrive and the ambulance will be in route.

## 2020-09-16 NOTE — THERAPY TREATMENT NOTE
Acute Care - Physical Therapy Treatment Note   Drew     Patient Name: Demarcus Pastor  : 1933  MRN: [id number]  Today's Date: 2020   Onset of Illness/Injury or Date of Surgery: 20       PT Assessment (last 12 hours)      PT Evaluation and Treatment     Row Name 20 1200          Physical Therapy Time and Intention    Subjective Information  no complaints  -CS     Document Type  therapy note (daily note)  -CS     Mode of Treatment  individual therapy;physical therapy  -CS     Patient Effort  poor  -CS     Symptoms Noted During/After Treatment  increased pain  -CS     Comment  Pt seen bedside this AM.  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)  -CS     Existing Precautions/Restrictions  fall;hip;right  -CS     Limitations/Impairments  safety/cognitive;other (see comments) communication barrier: expressive aphasia  -CS     Row Name 20 1200          Living Environment    Current Living Arrangements  extended care facility  -CS     Row Name 20 1200          Sensory    Hearing Status  WFL  -CS     Row Name 20 1200          Vision Assessment/Intervention    Visual Impairment/Limitations  WFL  -CS     Row Name 20 1200          Cognition    Affect/Mental Status (Cognitive)  WFL  -CS     Orientation Status (Cognition)  unable/difficult to assess  -CS     Follows Commands (Cognition)  follows one-step commands;verbal cues/prompting required;visual cue  -CS     Row Name 20 1200          Pain    Additional Documentation  -- moderate c/o w/ (R)LE movement  -CS     Row Name 20 1200          Pain Scale: FACES Pre/Post-Treatment    Pain: FACES Scale, Pretreatment  2-->hurts little bit  -CS     Posttreatment Pain Rating  6-->hurts even more  -CS     Row Name 20 1200          Range of Motion Comprehensive    General  Range of Motion  lower extremity range of motion deficits identified L LE AROM appears WFL; R shoulder ROM limited 50%  -     Row Name 09/16/20 1200          Mobility    Extremity Weight-bearing Status  left lower extremity;right lower extremity  -     Left Lower Extremity (Weight-bearing Status)  full weight-bearing (FWB)  -     Right Lower Extremity (Weight-bearing Status)  full weight-bearing (FWB)  -     Row Name 09/16/20 1200          Bed Mobility    Bed Mobility  rolling left;rolling right;scooting/bridging  -     Rolling Left [place] (Bed Mobility)  dependent (less than 25% patient effort)  -     Rolling Right [place] (Bed Mobility)  dependent (less than 25% patient effort)  -     Scooting/Bridging [place] (Bed Mobility)  dependent (less than 25% patient effort)  -     Supine-Sit [place] (Bed Mobility)  verbal cues;nonverbal cues (demo/gesture);maximum assist (25% patient effort)  -     Sit-Supine [place] (Bed Mobility)  verbal cues;nonverbal cues (demo/gesture);maximum assist (25% patient effort)  -     Bed Mobility, Safety Issues  decreased use of arms for pushing/pulling;decreased use of legs for bridging/pushing  -     Assistive Device (Bed Mobility)  bed rails;draw sheet  -     Row Name 09/16/20 1200          Transfers    Transfers  sit-stand transfer;stand-sit transfer  -     Comment (Transfers)  Pt completed sit<-->stand x 2 w/ moderate assist  -     Sit-Stand [place] (Transfers)  verbal cues;nonverbal cues (demo/gesture);dependent (less than 25% patient effort) unable to achieve  -     Stand-Sit [place] (Transfers)  verbal cues;nonverbal cues (demo/gesture);dependent (less than 25% patient effort)  -     Row Name 09/16/20 1200          Sit-Stand Transfer    Assistive Device (Sit-Stand Transfers)  walker, front-wheeled  -     Row Name 09/16/20 1200          Stand-Sit Transfer    Assistive Device (Stand-Sit Transfers)  walker,  front-wheeled  -CS     Row Name 09/16/20 1200          Gait/Stairs (Locomotion)    [place] Level (Gait)  unable to assess  -CS     Row Name 09/16/20 1200          Safety Issues, Functional Mobility    Impairments Affecting Function (Mobility)  range of motion (ROM);strength  -CS     Row Name 09/16/20 1200          Respiratory WDL    Respiratory WDL  WDL  -CS     Rhythm/Pattern, Respiratory  unlabored;pattern regular;depth regular  -CS     Expansion/Accessory Muscles/Retractions  no retractions;no use of accessory muscles;expansion symmetric  -CS     Nailbeds  no discoloration  -CS     Mucous Membranes  pink;intact;moist  -CS     Cough Frequency  no cough  -CS     Row Name 09/16/20 1200          Breath Sounds    Breath Sounds  All Fields  -CS     All Lung Fields Breath Sounds  Anterior:;clear;equal bilaterally  -     Row Name 09/16/20 1200          Skin WDL    Skin WDL  ex  -CS     Skin Color/Characteristics  redness blanchable  -CS     Skin Temperature  warm  -CS     Skin Moisture  dry  -CS     Skin Elasticity  quick return to original state  -CS     Skin Integrity  bruised (ecchymotic);incision;scab;skin tear  -     Row Name 09/16/20 1200          Wound 09/12/20 0958 Right hip Incision    Wound - Properties Group Placement Date: 09/12/20  - Placement Time: 0958 - Present on Hospital Admission: N  - Side: Right  - Location: hip  -J[facility] Primary Wound Type: Incision  -JH, s/p bipolar hip replacement  Additional Comments: staples,adaptic, 4x4s, abd pad, tape- drsg is c/d/i  -    Closure  Naya  -     Retired Wound - Properties Group Date first assessed: 09/12/20  [facility] Time first assessed: 0958  - Present on Hospital Admission: N  -JH Side: Right  -JH Location: hip  -JH Primary Wound Type: Incision  -JH, s/p bipolar hip replacement  Additional Comments: staples,adaptic, 4x4s, abd pad, tape- drsg is c/d/i  -    Row Name             Wound 11/10/19 2200 Right medial;distal arm Abrasion    Wound -  Properties Group Placement Date: 11/10/19  -RN Placement Time: 2200  -RN Present on Hospital Admission: Y  -RN Side: Right  -RN Location: arm  -RN Primary Wound Type: Abrasion  -RN    Retired Wound - Properties Group Date first assessed: 11/10/19  -RN Time first assessed: 2200  -RN Present on Hospital Admission: Y  -RN Side: Right  -RN Retired Orientation: medial;distal  -RN Location: arm  -RN Primary Wound Type: Abrasion  -RN    Row Name 09/16/20 1200          Coping    Observed Emotional State  calm;cooperative  -CS     Verbalized Emotional State  acceptance  -CS     Family/Support Persons  durable/healthcare power of ;son  -CS     Involvement in Care  not present at bedside  -CS     Row Name 09/16/20 1200          Physical Therapy Goals    Bed Mobility Goal Selection (PT)  bed mobility, PT goal 1  -CS     Transfer Goal Selection (PT)  transfer, PT goal 1;transfer, PT goal 2  -     Row Name 09/16/20 1200          Bed Mobility Goal 1 (PT)    Activity/Assistive Device (Bed Mobility Goal 1, PT)  scooting;sit to supine/supine to sit  -CS     [place] Level/Cues Needed (Bed Mobility Goal 1, PT)  moderate assist (50-74% patient effort)  -CS     Time Frame (Bed Mobility Goal 1, PT)  by discharge  -CS     Row Name 09/16/20 1200          Transfer Goal 1 (PT)    Activity/Assistive Device (Transfer Goal 1, PT)  sit-to-stand/stand-to-sit  -CS     [place] Level/Cues Needed (Transfer Goal 1, PT)  minimum assist (75% or more patient effort)  -CS     Time Frame (Transfer Goal 1, PT)  by discharge  -CS     Row Name 09/16/20 1200          Transfer Goal 2 (PT)    Activity/Assistive Device (Transfer Goal 2, PT)  bed-to-chair/chair-to-bed  -CS     [place] Level/Cues Needed (Transfer Goal 2, PT)  moderate assist (50-74% patient effort)  -CS     Time Frame (Transfer Goal 2, PT)  by discharge  -CS     Row Name 09/16/20 1200          Therapy Assessment/Plan (PT)    Rehab Potential (PT)  fair, will monitor progress  closely  -CS     Criteria for Skilled Interventions Met (PT)  yes;meets criteria;skilled treatment is necessary  -CS     Problem List (PT)  problems related to;balance;coordination;hemiparesis/hemiplegia;mobility;motor control;muscle tone;range of motion (ROM);strength;pain;postural control;communication  -CS     Activity Limitations Related to Problem List (PT)  unable to ambulate safely;unable to transfer safely  -CS     Row Name 09/16/20 1200          Progress Summary (PT)    Progress Toward Functional Goals (PT)  progress toward functional goals is gradual  -CS     Barriers to Overall Progress (PT)  pain  -CS       User Key  (r) = Recorded By, (t) = Taken By, (c) = Cosigned By    Initials Name Provider Type    Mariola Reno RN Registered Nurse    Alyssa Coon RN Registered Nurse    Jhonatan Frausto, PT Physical Therapist        Physical Therapy Education                 Title: PT OT SLP Therapies (In Progress)     Topic: Physical Therapy (In Progress)     Point: Mobility training (In Progress)     Learning Progress Summary           Patient Acceptance, E,D, NL by  at 9/16/2020 1203    Acceptance, E, NL,NR by  at 9/15/2020 1612    Acceptance, E,D, NR by AG at 9/14/2020 1449    Comment: reviewed SUSIE precautions and importance of mobilization.    Acceptance, E, VU by HN at 9/13/2020 1139    Acceptance, E,D, VU,NR by AG at 9/12/2020 1431    Comment: instructed in purpose and intention of PT, importance of mobilization; instructed in hip precautions.                   Point: Home exercise program (In Progress)     Learning Progress Summary           Patient Acceptance, E,D, NL by  at 9/16/2020 1203    Acceptance, E, NL,NR by HG at 9/15/2020 1612    Acceptance, E,D, NR by AG at 9/14/2020 1449    Comment: reviewed SUSIE precautions and importance of mobilization.    Acceptance, E, VU by HN at 9/13/2020 1139    Acceptance, E,D, VU,NR by AG at 9/12/2020 1431    Comment: instructed in purpose and  intention of PT, importance of mobilization; instructed in hip precautions.                   Point: Body mechanics (In Progress)     Learning Progress Summary           Patient Acceptance, E,D, NL by  at 9/16/2020 1203    Acceptance, E, NL,NR by  at 9/15/2020 1612    Acceptance, E,D, NR by AG at 9/14/2020 1449    Comment: reviewed SUSIE precautions and importance of mobilization.    Acceptance, E, VU by HN at 9/13/2020 1139    Acceptance, E,D, VU,NR by AG at 9/12/2020 1431    Comment: instructed in purpose and intention of PT, importance of mobilization; instructed in hip precautions.                   Point: Precautions (In Progress)     Learning Progress Summary           Patient Acceptance, E,D, NL by  at 9/16/2020 1203    Acceptance, E, NL,NR by  at 9/15/2020 1612    Acceptance, E,D, NR by AG at 9/14/2020 1449    Comment: reviewed SUSIE precautions and importance of mobilization.    Acceptance, E, VU by HN at 9/13/2020 1139    Acceptance, E,D, VU,NR by AG at 9/12/2020 1431    Comment: instructed in purpose and intention of PT, importance of mobilization; instructed in hip precautions.                               User Key     Initials Effective Dates Name Provider Type Discipline     04/03/18 -  Crystal Cordero, PT Physical Therapist PT     07/26/19 -  Evelyn Cordero, BREE Registered Nurse Nurse     01/30/20 -  Evelyn Santiago, RN Registered Nurse Nurse     03/30/20 -  Jhonatan Silverman, PT Physical Therapist PT              PT Recommendation and Plan  Anticipated Discharge Disposition (PT): skilled nursing facility  Therapy Frequency (PT): other (see comments)(6 days/ week; 1-2 times/ day)  Progress Summary (PT)  Progress Toward Functional Goals (PT): progress toward functional goals is gradual  Barriers to Overall Progress (PT): pain       Time Calculation:   PT Charges     Row Name 09/16/20 1203             Time Calculation    PT Received On  09/16/20  -      PT Goal Re-Cert Due Date  09/26/20  -          Time Calculation- PT    Total Timed Code Minutes- PT  30 minute(s)  -CS         Timed Charges    49042 - PT Therapeutic Activity Minutes  30  -CS        User Key  (r) = Recorded By, (t) = Taken By, (c) = Cosigned By    Initials Name Provider Type    CS Jhonatan Silverman, PT Physical Therapist        Therapy Charges for Today     Code Description Service Date Service Provider Modifiers Qty    [id number]  PT THERAPEUTIC ACT EA 15 MIN 9/16/2020 Jhonatan Silverman, PT GP 2               Jhonatan Silverman, PT  9/16/2020

## 2020-09-16 NOTE — NURSING NOTE
[facility] notified that patient would be staying in the hospital overnight

## 2020-09-16 NOTE — NURSING NOTE
Dr Delgado notified of patient's Bradycardia in the 40's. SBP also low, though MAP is in the 70's. Will cancel transport to NH and monitor overnight

## 2020-09-16 NOTE — NURSING NOTE
Called [facility] ambulance service, per dispatch the ambulance should be arriving at any time to transport pt to [facility].

## 2020-09-16 NOTE — PLAN OF CARE
Problem: Fall Risk (Adult)  Intervention: Monitor/Assist with Self Care  Recent Flowsheet Documentation  Taken 9/16/2020 1400 by Evelyn Cordero RN  Self-Care Promotion: independence encouraged  Taken 9/16/2020 1300 by Evelyn Cordero RN  Activity Assistance Provided: assistance, 1 person  Taken 9/16/2020 1200 by Evelyn Cordero RN  Activity Assistance Provided: assistance, 1 person  Taken 9/16/2020 1100 by Evelyn Cordero RN  Activity Assistance Provided: assistance, 1 person  Taken 9/16/2020 0900 by Evelyn Cordero RN  Activity Assistance Provided: assistance, 1 person  Taken 9/16/2020 0830 by Evelyn Cordero RN  Activity Assistance Provided: assistance, 1 person  Taken 9/16/2020 0700 by Evelyn Cordero RN  Activity Assistance Provided: assistance, 1 person  Intervention: Promote Injury-Free Environment  Recent Flowsheet Documentation  Taken 9/16/2020 1400 by Evelyn Cordero RN  Safety Promotion/Fall Prevention:   activity supervised   assistive device/personal items within reach   clutter free environment maintained   fall prevention program maintained   nonskid shoes/slippers when out of bed   room organization consistent   safety round/check completed  Taken 9/16/2020 1200 by Evelyn Cordero RN  Safety Promotion/Fall Prevention:   activity supervised   assistive device/personal items within reach   clutter free environment maintained   fall prevention program maintained   nonskid shoes/slippers when out of bed   room organization consistent   safety round/check completed  Taken 9/16/2020 1100 by Evelyn Cordero RN  Safety Promotion/Fall Prevention:   activity supervised   assistive device/personal items within reach   clutter free environment maintained   fall prevention program maintained   nonskid shoes/slippers when out of bed   room organization consistent   safety round/check completed  Taken 9/16/2020 0900 by Evelyn Cordero RN  Safety Promotion/Fall Prevention:   activity supervised   clutter free environment  maintained   assistive device/personal items within reach   fall prevention program maintained   nonskid shoes/slippers when out of bed   room organization consistent   safety round/check completed  Taken 9/16/2020 0830 by Evelyn Cordero RN  Safety Promotion/Fall Prevention:   activity supervised   assistive device/personal items within reach   clutter free environment maintained   fall prevention program maintained   nonskid shoes/slippers when out of bed  Taken 9/16/2020 0700 by Evelyn Cordero RN  Safety Promotion/Fall Prevention:   activity supervised   clutter free environment maintained   fall prevention program maintained   nonskid shoes/slippers when out of bed   room organization consistent   safety round/check completed  Intervention: Identify and Manage Contributors to Fall Injury Risk  Recent Flowsheet Documentation  Taken 9/16/2020 1400 by Evelyn Cordero RN  Medication Review/Management: medications reviewed  Self-Care Promotion: independence encouraged  Taken 9/16/2020 1200 by Evelyn Cordero RN  Medication Review/Management: medications reviewed  Taken 9/16/2020 1100 by Evelyn Cordero RN  Medication Review/Management: medications reviewed  Taken 9/16/2020 0900 by Evelyn Cordero RN  Medication Review/Management: medications reviewed  Taken 9/16/2020 0830 by Evelyn Cordero RN  Medication Review/Management: medications reviewed  Taken 9/16/2020 0700 by Evelyn Cordero RN  Medication Review/Management: medications reviewed     Problem: Skin Injury Risk (Adult)  Intervention: Promote and Optimize Oral Intake  Recent Flowsheet Documentation  Taken 9/16/2020 1400 by Evelyn Cordero RN  Oral Nutrition Promotion: medicated  Taken 9/16/2020 0830 by Evelyn Cordero RN  Oral Nutrition Promotion: medicated  Intervention: Prevent/Manage Excess Moisture  Recent Flowsheet Documentation  Taken 9/16/2020 1400 by Evelyn Cordero RN  Skin Protection: adhesive use limited  Taken 9/16/2020 0830 by Evelyn Cordero RN  Skin  Protection: adhesive use limited  Intervention: Maintain Head of Bed Elevation Less Than 30 Degrees as Tolerated  Recent Flowsheet Documentation  Taken 9/16/2020 1400 by Evelyn Cordero RN  Head of Bed (HOB): HOB at 30-45 degrees  Taken 9/16/2020 1300 by Evelyn Cordero RN  Head of Bed (HOB): HOB at 30-45 degrees  Taken 9/16/2020 1200 by Evelyn Cordero RN  Head of Bed (HOB): HOB at 30 degrees  Taken 9/16/2020 1100 by Evelyn Cordero RN  Head of Bed (HOB): HOB at 30 degrees  Taken 9/16/2020 0900 by Evelyn Cordero RN  Head of Bed (HOB): HOB at 30 degrees  Taken 9/16/2020 0830 by Evelyn Cordero RN  Head of Bed (HOB): HOB at 30-45 degrees  Taken 9/16/2020 0700 by Evelyn Cordero RN  Head of Bed (HOB): HOB at 30 degrees  Intervention: Prevent/Minimize Shear/Friction Injuries  Recent Flowsheet Documentation  Taken 9/16/2020 1400 by Evelyn Cordero RN  Pressure Reduction Devices: pressure-redistributing mattress utilized  Taken 9/16/2020 1300 by Evelyn Cordero RN  Positioning/Transfer Devices:   pillows   wedge   in use  Taken 9/16/2020 1200 by Evelyn Cordero RN  Positioning/Transfer Devices:   pillows   wedge   in use  Taken 9/16/2020 1100 by Evelyn Cordero RN  Positioning/Transfer Devices:   pillows   wedge   in use  Taken 9/16/2020 0900 by Evelyn Cordero RN  Positioning/Transfer Devices:   pillows   wedge   in use  Taken 9/16/2020 0830 by Evelyn Cordero RN  Pressure Reduction Devices: pressure-redistributing mattress utilized  Positioning/Transfer Devices: pillows  Taken 9/16/2020 0700 by Evelyn Cordero RN  Positioning/Transfer Devices:   pillows   wedge   in use  Intervention: Prevent or Minimize Pressure  Recent Flowsheet Documentation  Taken 9/16/2020 1400 by Evelyn Cordero RN  Pressure Reduction Techniques:   frequent weight shift encouraged   weight shift assistance provided  Taken 9/16/2020 1300 by Evelyn Cordero RN  Body Position: side-lying, left  Taken 9/16/2020 1200 by Evelyn Cordero RN  Body Position: side-lying,  left  Taken 9/16/2020 1100 by Evelyn Cordero RN  Body Position: side-lying, right  Taken 9/16/2020 0900 by Evelyn Cordero RN  Body Position: side-lying, left  Taken 9/16/2020 0830 by Evelyn Cordero RN  Pressure Reduction Techniques: frequent weight shift encouraged  Body Position: side-lying, left  Taken 9/16/2020 0700 by Evelyn Cordero RN  Body Position: side-lying, right     Problem: Fall Injury Risk  Goal: Absence of Fall and Fall-Related Injury  Intervention: Identify and Manage Contributors to Fall Injury Risk  Recent Flowsheet Documentation  Taken 9/16/2020 1400 by Evelyn Cordero RN  Medication Review/Management: medications reviewed  Self-Care Promotion: independence encouraged  Taken 9/16/2020 1200 by Evelyn Cordero RN  Medication Review/Management: medications reviewed  Taken 9/16/2020 1100 by Evelyn Cordero RN  Medication Review/Management: medications reviewed  Taken 9/16/2020 0900 by Evelyn Cordero RN  Medication Review/Management: medications reviewed  Taken 9/16/2020 0830 by Evelyn Cordero RN  Medication Review/Management: medications reviewed  Taken 9/16/2020 0700 by Evelyn Cordero RN  Medication Review/Management: medications reviewed  Intervention: Promote Injury-Free Environment  Recent Flowsheet Documentation  Taken 9/16/2020 1400 by Evelyn Cordero RN  Safety Promotion/Fall Prevention:   activity supervised   assistive device/personal items within reach   clutter free environment maintained   fall prevention program maintained   nonskid shoes/slippers when out of bed   room organization consistent   safety round/check completed  Taken 9/16/2020 1200 by Evelyn Cordero RN  Safety Promotion/Fall Prevention:   activity supervised   assistive device/personal items within reach   clutter free environment maintained   fall prevention program maintained   nonskid shoes/slippers when out of bed   room organization consistent   safety round/check completed  Taken 9/16/2020 1100 by Evelyn Cordero RN  Safety  Promotion/Fall Prevention:   activity supervised   assistive device/personal items within reach   clutter free environment maintained   fall prevention program maintained   nonskid shoes/slippers when out of bed   room organization consistent   safety round/check completed  Taken 9/16/2020 0900 by Evelyn Cordero RN  Safety Promotion/Fall Prevention:   activity supervised   clutter free environment maintained   assistive device/personal items within reach   fall prevention program maintained   nonskid shoes/slippers when out of bed   room organization consistent   safety round/check completed  Taken 9/16/2020 0830 by Evelyn Cordero RN  Safety Promotion/Fall Prevention:   activity supervised   assistive device/personal items within reach   clutter free environment maintained   fall prevention program maintained   nonskid shoes/slippers when out of bed  Taken 9/16/2020 0700 by Evelyn Cordero RN  Safety Promotion/Fall Prevention:   activity supervised   clutter free environment maintained   fall prevention program maintained   nonskid shoes/slippers when out of bed   room organization consistent   safety round/check completed     Problem: Fall Injury Risk  Goal: Absence of Fall and Fall-Related Injury  Intervention: Identify and Manage Contributors to Fall Injury Risk  Recent Flowsheet Documentation  Taken 9/16/2020 1400 by Evelyn Cordero RN  Medication Review/Management: medications reviewed  Self-Care Promotion: independence encouraged  Taken 9/16/2020 1200 by Evelyn Cordero RN  Medication Review/Management: medications reviewed  Taken 9/16/2020 1100 by Evelyn Cordero RN  Medication Review/Management: medications reviewed  Taken 9/16/2020 0900 by Evelyn Cordero RN  Medication Review/Management: medications reviewed  Taken 9/16/2020 0830 by Evelyn Cordero RN  Medication Review/Management: medications reviewed  Taken 9/16/2020 0700 by Evelyn Cordero RN  Medication Review/Management: medications reviewed  Intervention:  Promote Injury-Free Environment  Recent Flowsheet Documentation  Taken 9/16/2020 1400 by Evelyn Cordero RN  Safety Promotion/Fall Prevention:   activity supervised   assistive device/personal items within reach   clutter free environment maintained   fall prevention program maintained   nonskid shoes/slippers when out of bed   room organization consistent   safety round/check completed  Taken 9/16/2020 1200 by Evelyn Cordero RN  Safety Promotion/Fall Prevention:   activity supervised   assistive device/personal items within reach   clutter free environment maintained   fall prevention program maintained   nonskid shoes/slippers when out of bed   room organization consistent   safety round/check completed  Taken 9/16/2020 1100 by Evelyn Cordero RN  Safety Promotion/Fall Prevention:   activity supervised   assistive device/personal items within reach   clutter free environment maintained   fall prevention program maintained   nonskid shoes/slippers when out of bed   room organization consistent   safety round/check completed  Taken 9/16/2020 0900 by Evelyn Cordero RN  Safety Promotion/Fall Prevention:   activity supervised   clutter free environment maintained   assistive device/personal items within reach   fall prevention program maintained   nonskid shoes/slippers when out of bed   room organization consistent   safety round/check completed  Taken 9/16/2020 0830 by Evelyn Cordero RN  Safety Promotion/Fall Prevention:   activity supervised   assistive device/personal items within reach   clutter free environment maintained   fall prevention program maintained   nonskid shoes/slippers when out of bed  Taken 9/16/2020 0700 by Evelyn Cordero RN  Safety Promotion/Fall Prevention:   activity supervised   clutter free environment maintained   fall prevention program maintained   nonskid shoes/slippers when out of bed   room organization consistent   safety round/check completed     Problem: Fall Injury Risk  Goal: Absence  of Fall and Fall-Related Injury  Outcome: Resolved for upgrade, new template will be applied  Intervention: Identify and Manage Contributors to Fall Injury Risk  Flowsheets  Taken 9/16/2020 1400  Medication Review/Management: medications reviewed  Self-Care Promotion: independence encouraged  Taken 9/16/2020 1200  Medication Review/Management: medications reviewed  Taken 9/16/2020 1100  Medication Review/Management: medications reviewed  Taken 9/16/2020 0900  Medication Review/Management: medications reviewed  Taken 9/16/2020 0830  Medication Review/Management: medications reviewed  Taken 9/16/2020 0700  Medication Review/Management: medications reviewed  Intervention: Promote Injury-Free Environment  Flowsheets  Taken 9/16/2020 1400  Safety Promotion/Fall Prevention:   activity supervised   assistive device/personal items within reach   clutter free environment maintained   fall prevention program maintained   nonskid shoes/slippers when out of bed   room organization consistent   safety round/check completed  Taken 9/16/2020 1200  Safety Promotion/Fall Prevention:   activity supervised   assistive device/personal items within reach   clutter free environment maintained   fall prevention program maintained   nonskid shoes/slippers when out of bed   room organization consistent   safety round/check completed  Taken 9/16/2020 1100  Safety Promotion/Fall Prevention:   activity supervised   assistive device/personal items within reach   clutter free environment maintained   fall prevention program maintained   nonskid shoes/slippers when out of bed   room organization consistent   safety round/check completed  Taken 9/16/2020 0900  Safety Promotion/Fall Prevention:   activity supervised   clutter free environment maintained   assistive device/personal items within reach   fall prevention program maintained   nonskid shoes/slippers when out of bed   room organization consistent   safety round/check completed  Taken  9/16/2020 0830  Safety Promotion/Fall Prevention:   activity supervised   assistive device/personal items within reach   clutter free environment maintained   fall prevention program maintained   nonskid shoes/slippers when out of bed  Taken 9/16/2020 0700  Safety Promotion/Fall Prevention:   activity supervised   clutter free environment maintained   fall prevention program maintained   nonskid shoes/slippers when out of bed   room organization consistent   safety round/check completed     Problem: Skin Injury Risk Increased  Goal: Skin Health and Integrity  Intervention: Optimize Skin Protection  Recent Flowsheet Documentation  Taken 9/16/2020 1400 by Evelyn Cordero RN  Pressure Reduction Techniques:   frequent weight shift encouraged   weight shift assistance provided  Head of Bed (HOB): HOB at 30-45 degrees  Pressure Reduction Devices: pressure-redistributing mattress utilized  Skin Protection: adhesive use limited  Taken 9/16/2020 1300 by Evelyn Cordero RN  Head of Bed (HOB): HOB at 30-45 degrees  Taken 9/16/2020 1200 by Evelyn Cordero RN  Head of Bed (HOB): HOB at 30 degrees  Taken 9/16/2020 1100 by Evelyn Cordero RN  Head of Bed (HOB): HOB at 30 degrees  Taken 9/16/2020 0900 by Evelyn Cordero RN  Head of Bed (HOB): HOB at 30 degrees  Taken 9/16/2020 0830 by Evelyn Cordero RN  Pressure Reduction Techniques: frequent weight shift encouraged  Head of Bed (HOB): HOB at 30-45 degrees  Pressure Reduction Devices: pressure-redistributing mattress utilized  Skin Protection: adhesive use limited  Taken 9/16/2020 0700 by Evelyn Cordero RN  Head of Bed (HOB): HOB at 30 degrees  Intervention: Promote and Optimize Oral Intake  Recent Flowsheet Documentation  Taken 9/16/2020 1400 by Evelyn Cordero RN  Oral Nutrition Promotion: medicated  Taken 9/16/2020 0830 by Evelyn Cordero RN  Oral Nutrition Promotion: medicated     Problem: Skin Injury Risk Increased  Goal: Skin Health and Integrity  Outcome: Resolved for upgrade, new  template will be applied  Intervention: Optimize Skin Protection  Flowsheets  Taken 9/16/2020 1400  Pressure Reduction Techniques:   frequent weight shift encouraged   weight shift assistance provided  Head of Bed (HOB): HOB at 30-45 degrees  Pressure Reduction Devices: pressure-redistributing mattress utilized  Skin Protection: adhesive use limited  Taken 9/16/2020 1300  Head of Bed (HOB): HOB at 30-45 degrees  Taken 9/16/2020 1200  Head of Bed (HOB): HOB at 30 degrees  Taken 9/16/2020 1100  Head of Bed (HOB): HOB at 30 degrees  Taken 9/16/2020 0900  Head of Bed (HOB): HOB at 30 degrees  Taken 9/16/2020 0830  Pressure Reduction Techniques: frequent weight shift encouraged  Head of Bed (HOB): HOB at 30-45 degrees  Pressure Reduction Devices: pressure-redistributing mattress utilized  Skin Protection: adhesive use limited  Taken 9/16/2020 0700  Head of Bed (HOB): HOB at 30 degrees  Intervention: Promote and Optimize Oral Intake  Flowsheets  Taken 9/16/2020 1400  Oral Nutrition Promotion: medicated  Taken 9/16/2020 0830  Oral Nutrition Promotion: medicated     Problem: Surgical Site Infection  Goal: Improved Infection Symptoms  Intervention: Prevent and Manage Infection  Recent Flowsheet Documentation  Taken 9/16/2020 1300 by Evelyn Cordero RN  Infection Management: aseptic technique maintained  Taken 9/16/2020 1200 by Evelyn Cordero RN  Infection Management: aseptic technique maintained  Taken 9/16/2020 1100 by Evelyn Cordero RN  Infection Management: aseptic technique maintained  Taken 9/16/2020 0900 by Evelyn Cordero RN  Infection Management: aseptic technique maintained  Taken 9/16/2020 0830 by Evelyn Cordero RN  Infection Management: aseptic technique maintained     Problem: Adjustment to Injury (Hip Fracture)  Goal: Optimal Coping with Change in Health Status  Intervention: Support Psychosocial Response to Injury and Mobility Change  Recent Flowsheet Documentation  Taken 9/16/2020 0830 by Evelyn Cordero  RN  Supportive Measures: active listening utilized  Family/Support System Care: support provided     Problem: Embolism (Hip Fracture)  Goal: Absence of Embolism  Intervention: Prevent and Manage Embolism Risk  Recent Flowsheet Documentation  Taken 9/16/2020 0830 by Evelyn Cordero RN  VTE Prevention/Management: compression stockings off     Problem: Functional Ability Impaired (Hip Fracture)  Goal: Optimal Functional Performance  Intervention: Promote Optimal Functional Status  Recent Flowsheet Documentation  Taken 9/16/2020 1400 by Evelyn Cordero RN  Self-Care Promotion: independence encouraged  Taken 9/16/2020 1300 by Evelyn Cordero RN  Activity Management: activity adjusted per tolerance  Taken 9/16/2020 1200 by Evelyn Cordero RN  Activity Management: activity adjusted per tolerance  Taken 9/16/2020 1100 by Evelyn Cordero RN  Activity Management: activity adjusted per tolerance  Taken 9/16/2020 0900 by Evelyn Cordero RN  Activity Management: activity adjusted per tolerance  Taken 9/16/2020 0830 by Evelyn Cordero RN  Activity Management: activity adjusted per tolerance  Taken 9/16/2020 0700 by Evelyn Cordero RN  Activity Management: activity adjusted per tolerance     Problem: Pain Acute  Goal: Optimal Pain Control  Intervention: Optimize Psychosocial Wellbeing  Recent Flowsheet Documentation  Taken 9/16/2020 0830 by Evelyn Cordero RN  Supportive Measures: active listening utilized  Diversional Activities: television     Problem: Adult Inpatient Plan of Care  Goal: Absence of Hospital-Acquired Illness or Injury  Intervention: Identify and Manage Fall Risk  Recent Flowsheet Documentation  Taken 9/16/2020 1400 by Evelyn Cordero RN  Safety Promotion/Fall Prevention:   activity supervised   assistive device/personal items within reach   clutter free environment maintained   fall prevention program maintained   nonskid shoes/slippers when out of bed   room organization consistent   safety round/check completed  Taken  9/16/2020 1200 by Evelyn Cordero RN  Safety Promotion/Fall Prevention:   activity supervised   assistive device/personal items within reach   clutter free environment maintained   fall prevention program maintained   nonskid shoes/slippers when out of bed   room organization consistent   safety round/check completed  Taken 9/16/2020 1100 by Evelyn Cordero RN  Safety Promotion/Fall Prevention:   activity supervised   assistive device/personal items within reach   clutter free environment maintained   fall prevention program maintained   nonskid shoes/slippers when out of bed   room organization consistent   safety round/check completed  Taken 9/16/2020 0900 by Evelyn Cordero RN  Safety Promotion/Fall Prevention:   activity supervised   clutter free environment maintained   assistive device/personal items within reach   fall prevention program maintained   nonskid shoes/slippers when out of bed   room organization consistent   safety round/check completed  Taken 9/16/2020 0830 by Evelyn Cordero RN  Safety Promotion/Fall Prevention:   activity supervised   assistive device/personal items within reach   clutter free environment maintained   fall prevention program maintained   nonskid shoes/slippers when out of bed  Taken 9/16/2020 0700 by Evelyn Cordero RN  Safety Promotion/Fall Prevention:   activity supervised   clutter free environment maintained   fall prevention program maintained   nonskid shoes/slippers when out of bed   room organization consistent   safety round/check completed  Intervention: Prevent Skin Injury  Recent Flowsheet Documentation  Taken 9/16/2020 1300 by Evelyn Cordero RN  Body Position: side-lying, left  Taken 9/16/2020 1200 by Evelyn Cordero RN  Body Position: side-lying, left  Taken 9/16/2020 1100 by Evelyn Cordero RN  Body Position: side-lying, right  Taken 9/16/2020 0900 by Evelyn Cordero RN  Body Position: side-lying, left  Taken 9/16/2020 0830 by Evelyn Cordero RN  Body Position:  side-lying, left  Taken 9/16/2020 0700 by Evelyn Cordero RN  Body Position: side-lying, right  Intervention: Prevent and Manage VTE (venous thromboembolism) Risk  Recent Flowsheet Documentation  Taken 9/16/2020 0830 by Evelyn Cordero RN  VTE Prevention/Management: compression stockings off  Intervention: Prevent Infection  Recent Flowsheet Documentation  Taken 9/16/2020 1200 by Evelyn Cordero RN  Infection Prevention:   rest/sleep promoted   single patient room provided  Taken 9/16/2020 1100 by Evelyn Cordero RN  Infection Prevention:   rest/sleep promoted   single patient room provided  Taken 9/16/2020 0900 by Evelyn Cordero RN  Infection Prevention:   rest/sleep promoted   single patient room provided  Taken 9/16/2020 0830 by Evelyn Cordero RN  Infection Prevention:   rest/sleep promoted   single patient room provided  Taken 9/16/2020 0800 by Evelyn Cordero RN  Infection Prevention:   rest/sleep promoted   single patient room provided  Taken 9/16/2020 0700 by Evelyn Cordero RN  Infection Prevention:   rest/sleep promoted   single patient room provided  Goal: Optimal Comfort and Wellbeing  Intervention: Provide Person-Centered Care  Recent Flowsheet Documentation  Taken 9/16/2020 0830 by Evelyn Cordero RN  Trust Relationship/Rapport:   care explained   choices provided   emotional support provided   questions answered   empathic listening provided   questions encouraged   reassurance provided   thoughts/feelings acknowledged     Problem: Swallowing Impairment  Goal: Improved Swallowing Without Aspiration  Intervention: Optimize Eating and Swallowing  Recent Flowsheet Documentation  Taken 9/16/2020 1300 by Evelyn Cordero RN  Aspiration Precautions: awake/alert before oral intake  Taken 9/16/2020 1200 by Evelyn Cordero RN  Aspiration Precautions:   awake/alert before oral intake   upright posture maintained   distractions minimized during oral intake  Taken 9/16/2020 1100 by Evelyn Cordero RN  Aspiration  Precautions:   awake/alert before oral intake   upright posture maintained   distractions minimized during oral intake  Taken 9/16/2020 0900 by Evelyn Cordero RN  Aspiration Precautions:   awake/alert before oral intake   upright posture maintained   distractions minimized during oral intake  Taken 9/16/2020 0830 by Evelyn Cordero RN  Aspiration Precautions: awake/alert before oral intake  Swallowing Interventions: Dysphagia: small bites/sips encouraged  Swallowing Method: throat clear/extra swallow   Goal Outcome Evaluation:  Plan of Care Reviewed With: patient  Progress: declining  Outcome Summary: pt. experiencing intense pain with R LE ROM.   Problem: Skin Injury Risk Increased  Goal: Skin Health and Integrity  Outcome: Resolved for upgrade, new template will be applied     Problem: Fall Injury Risk  Goal: Absence of Fall and Fall-Related Injury  Outcome: Resolved for upgrade, new template will be applied

## 2020-09-16 NOTE — PROGRESS NOTES
Discharge Planning Assessment  GERMAN Russell     Patient Name: Demarcus Pastor  MRN: [id number]  Today's Date: 9/16/2020    Admit Date: 9/11/2020      Discharge Plan     Row Name 09/16/20 1114       Plan    Final Discharge Disposition Code  03 - skilled nursing facility (SNF)    Final Note  Pt is being discharged on this date. SS spoke with Siri at [facility] and confirmed Pt could return. SS faxed discharge order, AVS and RN called report. SS arranged EMS transport with [facility]. No other needs identified.        Continued Care and Services - Admitted Since 9/11/2020     Destination     Service Provider Request Status Selected Services Address Phone Fax    [facility]  Pending - Request Sent N/A [address] [place] [id number] [phone]              Expected Discharge Date and Time     Expected Discharge Date Expected Discharge Time    Sep 16, 2020               Evelyn Velasquez

## 2020-09-21 ENCOUNTER — APPOINTMENT (OUTPATIENT)
Dept: GENERAL RADIOLOGY | Facility: HOSPITAL | Age: 85
End: 2020-09-21

## 2020-09-21 ENCOUNTER — APPOINTMENT (OUTPATIENT)
Dept: CT IMAGING | Facility: HOSPITAL | Age: 85
End: 2020-09-21

## 2020-09-21 ENCOUNTER — HOSPITAL ENCOUNTER (EMERGENCY)
Facility: HOSPITAL | Age: 85
Discharge: NURSING, REHAB OR LONG-TERM CARE | End: 2020-09-22
Attending: EMERGENCY MEDICINE | Admitting: EMERGENCY MEDICINE

## 2020-09-21 DIAGNOSIS — M25.551 ACUTE RIGHT HIP PAIN: Primary | ICD-10-CM

## 2020-09-21 LAB
BASOPHILS # BLD AUTO: 0.04 10*3/MM3 (ref 0–0.2)
BASOPHILS NFR BLD AUTO: 0.7 % (ref 0–1.5)
DEPRECATED RDW RBC AUTO: 57.1 FL (ref 37–54)
EOSINOPHIL # BLD AUTO: 0.48 10*3/MM3 (ref 0–0.4)
EOSINOPHIL NFR BLD AUTO: 8.6 % (ref 0.3–6.2)
ERYTHROCYTE [DISTWIDTH] IN BLOOD BY AUTOMATED COUNT: 16.6 % (ref 12.3–15.4)
HCT VFR BLD AUTO: 39 % (ref 37.5–51)
HGB BLD-MCNC: 12.3 G/DL (ref 13–17.7)
IMM GRANULOCYTES # BLD AUTO: 0.06 10*3/MM3 (ref 0–0.05)
IMM GRANULOCYTES NFR BLD AUTO: 1.1 % (ref 0–0.5)
LYMPHOCYTES # BLD AUTO: 2.84 10*3/MM3 (ref 0.7–3.1)
LYMPHOCYTES NFR BLD AUTO: 50.9 % (ref 19.6–45.3)
MCH RBC QN AUTO: 29.6 PG (ref 26.6–33)
MCHC RBC AUTO-ENTMCNC: 31.5 G/DL (ref 31.5–35.7)
MCV RBC AUTO: 93.8 FL (ref 79–97)
MONOCYTES # BLD AUTO: 0.38 10*3/MM3 (ref 0.1–0.9)
MONOCYTES NFR BLD AUTO: 6.8 % (ref 5–12)
NEUTROPHILS NFR BLD AUTO: 1.78 10*3/MM3 (ref 1.7–7)
NEUTROPHILS NFR BLD AUTO: 31.9 % (ref 42.7–76)
NRBC BLD AUTO-RTO: 1.4 /100 WBC (ref 0–0.2)
PLATELET # BLD AUTO: 453 10*3/MM3 (ref 140–450)
PMV BLD AUTO: 11 FL (ref 6–12)
RBC # BLD AUTO: 4.16 10*6/MM3 (ref 4.14–5.8)
WBC # BLD AUTO: 5.58 10*3/MM3 (ref 3.4–10.8)

## 2020-09-21 PROCEDURE — 73502 X-RAY EXAM HIP UNI 2-3 VIEWS: CPT | Performed by: RADIOLOGY

## 2020-09-21 PROCEDURE — 73502 X-RAY EXAM HIP UNI 2-3 VIEWS: CPT

## 2020-09-21 PROCEDURE — 93010 ELECTROCARDIOGRAM REPORT: CPT | Performed by: INTERNAL MEDICINE

## 2020-09-21 PROCEDURE — 71045 X-RAY EXAM CHEST 1 VIEW: CPT | Performed by: RADIOLOGY

## 2020-09-21 PROCEDURE — 84484 ASSAY OF TROPONIN QUANT: CPT | Performed by: PHYSICIAN ASSISTANT

## 2020-09-21 PROCEDURE — 73552 X-RAY EXAM OF FEMUR 2/>: CPT | Performed by: RADIOLOGY

## 2020-09-21 PROCEDURE — 73700 CT LOWER EXTREMITY W/O DYE: CPT

## 2020-09-21 PROCEDURE — 73552 X-RAY EXAM OF FEMUR 2/>: CPT

## 2020-09-21 PROCEDURE — 85025 COMPLETE CBC W/AUTO DIFF WBC: CPT | Performed by: PHYSICIAN ASSISTANT

## 2020-09-21 PROCEDURE — 80053 COMPREHEN METABOLIC PANEL: CPT | Performed by: PHYSICIAN ASSISTANT

## 2020-09-21 PROCEDURE — 93005 ELECTROCARDIOGRAM TRACING: CPT | Performed by: EMERGENCY MEDICINE

## 2020-09-21 PROCEDURE — 71045 X-RAY EXAM CHEST 1 VIEW: CPT

## 2020-09-21 PROCEDURE — 99285 EMERGENCY DEPT VISIT HI MDM: CPT

## 2020-09-21 RX ORDER — HYDROCODONE BITARTRATE AND ACETAMINOPHEN 5; 325 MG/1; MG/1
1 TABLET ORAL ONCE
Status: COMPLETED | OUTPATIENT
Start: 2020-09-21 | End: 2020-09-21

## 2020-09-21 RX ORDER — MORPHINE SULFATE 2 MG/ML
1 INJECTION, SOLUTION INTRAMUSCULAR; INTRAVENOUS
Status: DISCONTINUED | OUTPATIENT
Start: 2020-09-21 | End: 2020-09-22 | Stop reason: HOSPADM

## 2020-09-21 RX ORDER — ONDANSETRON 2 MG/ML
4 INJECTION INTRAMUSCULAR; INTRAVENOUS ONCE
Status: DISCONTINUED | OUTPATIENT
Start: 2020-09-21 | End: 2020-09-22 | Stop reason: HOSPADM

## 2020-09-21 RX ADMIN — HYDROCODONE BITARTRATE AND ACETAMINOPHEN 1 TABLET: 5; 325 TABLET ORAL at 21:01

## 2020-09-21 NOTE — ED PROVIDER NOTES
"Subjective   Patient is an 87-year-old male sent from a local nursing home after hip injury.  I do not have much in the way of details, patient is not conversive.  Patient was admitted here on September 11 with a right hip fracture, had a right total hip replacement on September 12.  He tested positive for COVID-19 during that hospitalization but at least on that admission remained completely asymptomatic.  Today he was was getting physical therapy at the nursing home when the \"right hip gave out\" per the nursing home report.  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  He is not able to provide much in the way of history.  It seems that at times he answers yes or no appropriately, but this is not consistent.  He does not otherwise meaningfully communicate.          Review of Systems   Unable to perform ROS: Dementia (Dementia and expressive aphasia)       Past Medical History:   Diagnosis Date   • Abnormal CT of the abdomen 6/19/2016   • Alzheimer's disease with late onset (CMS/HCC)    • Anxiety    • Atelectasis    • Atherosclerotic heart disease    • Cardiomyopathy (CMS/HCC)    • Colitis 6/19/2016   • CVA (cerebral vascular accident) (CMS/HCC)    • Diverticulosis    • Dysphagia    • GERD (gastroesophageal reflux disease)    • Hemiplegia (CMS/HCC)    • History of transfusion    • Hyperlipidemia    • Hypertension    • Hypertension    • Hypokalemia    • Inguinal hernia    • Intracardiac thrombosis, not elsewhere classified    • Iron deficiency anemia    • Kidney stone    • Malnutrition (CMS/HCC)    • Muscle weakness    • Neuromuscular dysfunction of bladder    • Pancreatitis        Allergies   Allergen Reactions   • Penicillins Unknown - Low Severity       Past Surgical History:   Procedure Laterality Date   • COLONOSCOPY N/A 11/9/2016    Procedure: COLONOSCOPY CPT CODE: 36775;  Surgeon: Lilliam Wilson DO;  " Location:  [facility];  Service:    • ENDOSCOPY N/A 6/17/2016    Procedure: ESOPHAGOGASTRODUODENOSCOPY;  Surgeon: Alex Robles III, MD;  Location:  [facility];  Service:    • ENDOSCOPY N/A 11/9/2016    Procedure: ESOPHAGOGASTRODUODENOSCOPY WITH BIOPSY CPT CODE: 52011;  Surgeon: Lilliam Wilson DO;  [facility]:  [facility];  Service:    • ESOPHAGEAL DILATATION     • HIP BIPOLAR REPLACEMENT Right 9/12/2020    Procedure: HIP BIPOLAR REPLACEMENT;  Surgeon: Gustavo Araiza MD;  Location:  [facility];  Service: Orthopedics;  Laterality: Right;       Family History   Problem Relation Age of Onset   • Stroke Mother    • Heart disease Brother        Social History     Socioeconomic History   • Marital status:      Spouse name: Not on file   • Number of children: Not on file   • Years of education: Not on file   • Highest education level: Not on file   Tobacco Use   • Smoking status: Never Smoker   • Smokeless tobacco: Never Used   Substance and Sexual Activity   • Alcohol use: No   • Drug use: No   • Sexual activity: Defer           Objective   Physical Exam  Vitals signs and nursing note reviewed.   Constitutional:       Appearance: He is not toxic-appearing or diaphoretic.   HENT:      Head: Normocephalic and atraumatic.      Mouth/Throat:      Mouth: Mucous membranes are moist.   Eyes:      General: No scleral icterus.     Conjunctiva/sclera: Conjunctivae normal.   Neck:      Musculoskeletal: Normal range of motion and neck supple.   Cardiovascular:      Rate and Rhythm: Normal rate.      Pulses: Normal pulses.   Pulmonary:      Effort: Pulmonary effort is normal. No respiratory distress.      Breath sounds: Normal breath sounds.   Abdominal:      General: Bowel sounds are normal. There is no distension.      Palpations: Abdomen is soft.      Tenderness: There is no abdominal tenderness.   Musculoskeletal:      Comments: Patient guards his right hip, he keeps it flexed approximately 30 degrees, with his knee  flexed as well.  There is tenderness to palpation of the right hip.  He will allow me to extend it.  There is pain with internal and external rotation, but he does have range of motion, clinically does not seem to have a hip dislocation.  Mild muscular tenderness of the upper right thigh.  The distal thigh, knee, tib-fib, foot are all nontender and atraumatic.  The pelvis is stable and nontender.  There is no other musculoskeletal tenderness.   Skin:     General: Skin is warm and dry.      Capillary Refill: Capillary refill takes less than 2 seconds.   Neurological:      Mental Status: He is alert.      Comments: There is expressive aphasia.  He seems to answer yes/no appropriately at times, at other times not appropriately.  He seems to understand what I say to him.   Psychiatric:         Behavior: Behavior normal.         Procedures           ED Course  ED Course as of Sep 22 0213   Mon Sep 21, 2020   2220 Xrays are all negative per the radiologist.  He seems more comfortable.  He seems to tolerate range of motion better now.  However, the hip is , he still seems to have significant pain in the hip.  Noncontrasted CT of the hip has been ordered.    [CM]   2258 Endorsed to Hernesto Ramos and Dr. Garcia at shift change.    [CM]   Tue Sep 22, 2020   0211 CT rad interpreted:  1. Normal alignment of the right hip arthroplasty.  2. No evidence of acute fracture or hardware loosening.    [RB]      ED Course User Index  [CM] Diallo Nowak MD  [RB] Hernesto Ramos II, PA                                           MDM  Number of Diagnoses or Management Options  Acute right hip pain: new and requires workup     Amount and/or Complexity of Data Reviewed  Clinical lab tests: ordered and reviewed  Tests in the radiology section of CPT®: ordered and reviewed  Discuss the patient with other providers: yes    Risk of Complications, Morbidity, and/or Mortality  Presenting problems: moderate  Diagnostic procedures:  moderate  Management options: low    Patient Progress  Patient progress: stable      Final diagnoses:   Acute right hip pain            Hernesto Ramos II, PA  09/22/20 021

## 2020-09-22 VITALS
DIASTOLIC BLOOD PRESSURE: 76 MMHG | SYSTOLIC BLOOD PRESSURE: 126 MMHG | TEMPERATURE: 98.4 F | HEART RATE: 50 BPM | OXYGEN SATURATION: 100 % | HEIGHT: 70 IN | RESPIRATION RATE: 18 BRPM | WEIGHT: 140 LBS | BODY MASS INDEX: 20.04 KG/M2

## 2020-09-22 LAB
ALBUMIN SERPL-MCNC: 3.38 G/DL (ref 3.5–5.2)
ALBUMIN/GLOB SERPL: 1 G/DL
ALP SERPL-CCNC: 109 U/L (ref 39–117)
ALT SERPL W P-5'-P-CCNC: 26 U/L (ref 1–41)
ANION GAP SERPL CALCULATED.3IONS-SCNC: 10 MMOL/L (ref 5–15)
AST SERPL-CCNC: 46 U/L (ref 1–40)
BILIRUB SERPL-MCNC: 0.4 MG/DL (ref 0–1.2)
BUN SERPL-MCNC: 22 MG/DL (ref 8–23)
BUN/CREAT SERPL: 22.2 (ref 7–25)
CALCIUM SPEC-SCNC: 8.4 MG/DL (ref 8.6–10.5)
CHLORIDE SERPL-SCNC: 108 MMOL/L (ref 98–107)
CO2 SERPL-SCNC: 24 MMOL/L (ref 22–29)
CREAT SERPL-MCNC: 0.99 MG/DL (ref 0.76–1.27)
GFR SERPL CREATININE-BSD FRML MDRD: 72 ML/MIN/1.73
GLOBULIN UR ELPH-MCNC: 3.2 GM/DL
GLUCOSE SERPL-MCNC: 97 MG/DL (ref 65–99)
HOLD SPECIMEN: NORMAL
HOLD SPECIMEN: NORMAL
POTASSIUM SERPL-SCNC: 4.4 MMOL/L (ref 3.5–5.2)
PROT SERPL-MCNC: 6.6 G/DL (ref 6–8.5)
SODIUM SERPL-SCNC: 142 MMOL/L (ref 136–145)
TROPONIN T SERPL-MCNC: 0.02 NG/ML (ref 0–0.03)
WHOLE BLOOD HOLD SPECIMEN: NORMAL
WHOLE BLOOD HOLD SPECIMEN: NORMAL

## 2020-09-22 RX ORDER — HYDROCODONE BITARTRATE AND ACETAMINOPHEN 5; 325 MG/1; MG/1
1 TABLET ORAL EVERY 8 HOURS PRN
Qty: 10 TABLET | Refills: 0 | Status: SHIPPED | OUTPATIENT
Start: 2020-09-22

## 2020-09-22 NOTE — ED NOTES
Patient leaving with [facility] at this time. NASIR. VSS. IV removed. Discharge instructions sent with EMS to give to [facility].      Lien Unger RN  09/22/20 0250

## 2020-09-22 NOTE — ED NOTES
Called [facility] ems to transport patient back to [facility]     Huyen Echeverria  09/22/20 7491

## 2020-09-26 NOTE — PROGRESS NOTES
"Enter Query Response Below      Query Response:     COVID-19 present on admission          If applicable, please update the problem list.          Patient: Demarcus Pastor        : 1933  Account: [id number]           Admit Date: 2020        Options to Respond to Query:    1. Access the Encounter     a. From the To-Do Side bar, click Respond With New Note.     b. Answer query within the yellow box.                c. Update the Problem List if applicable.     :    Patient admitted with closed fracture of neck of right femur. Taken to OR and Bi-Polar hip replacement done on 20. Noted COVID-19 screening done 20 pre-op not detected. Repeat screening on 9/15/20 showed positive for COVID-19. Noted asymptomatic. Per your PN \"(Note patient was found to have positive COVID on surveillance testing before discharge today.  This was confirmed with an Abbott test.  Patient remains asymptomatic.  This was communicated with nursing home and they are going to quarantine him upon arrival to nursing home and continue to monitor his symptoms)..\" No treatment noted. After study can you please clarify if COVID-19 as:    COVID-19 present on admission   COVID-19 developed during stay  Other_______  Unable to determine    By submitting this query, we are merely seeking further clarification of documentation to accurately reflect all conditions that you are monitoring, evaluating, treating or that extend the hospitalization or utilize additional resources of care. Please utilize your independent clinical judgment when addressing the question(s) above.     This query and your response, once completed, will be entered into the legal medical record.    Sincerely,  Riana An   [phone]  Clinical Documentation Integrity Program   "

## 2020-10-03 ENCOUNTER — HOSPITAL ENCOUNTER (EMERGENCY)
Facility: HOSPITAL | Age: 85
Discharge: NURSING, REHAB OR LONG-TERM CARE | End: 2020-10-04
Attending: EMERGENCY MEDICINE | Admitting: EMERGENCY MEDICINE

## 2020-10-03 DIAGNOSIS — R04.0 EPISTAXIS: Primary | ICD-10-CM

## 2020-10-03 DIAGNOSIS — Z79.01 ANTICOAGULATED: ICD-10-CM

## 2020-10-03 PROCEDURE — 36415 COLL VENOUS BLD VENIPUNCTURE: CPT

## 2020-10-03 PROCEDURE — 99283 EMERGENCY DEPT VISIT LOW MDM: CPT

## 2020-10-04 VITALS
DIASTOLIC BLOOD PRESSURE: 72 MMHG | TEMPERATURE: 98.9 F | HEIGHT: 70 IN | WEIGHT: 140 LBS | OXYGEN SATURATION: 96 % | SYSTOLIC BLOOD PRESSURE: 116 MMHG | RESPIRATION RATE: 16 BRPM | HEART RATE: 82 BPM | BODY MASS INDEX: 20.04 KG/M2

## 2020-10-04 LAB
ANION GAP SERPL CALCULATED.3IONS-SCNC: 11.5 MMOL/L (ref 5–15)
APTT PPP: 33.9 SECONDS (ref 25.6–35.3)
BASOPHILS # BLD AUTO: 0.05 10*3/MM3 (ref 0–0.2)
BASOPHILS NFR BLD AUTO: 0.6 % (ref 0–1.5)
BUN SERPL-MCNC: 23 MG/DL (ref 8–23)
BUN/CREAT SERPL: 23.2 (ref 7–25)
CALCIUM SPEC-SCNC: 8.7 MG/DL (ref 8.6–10.5)
CHLORIDE SERPL-SCNC: 108 MMOL/L (ref 98–107)
CO2 SERPL-SCNC: 20.5 MMOL/L (ref 22–29)
CREAT SERPL-MCNC: 0.99 MG/DL (ref 0.76–1.27)
DEPRECATED RDW RBC AUTO: 54.3 FL (ref 37–54)
EOSINOPHIL # BLD AUTO: 0.2 10*3/MM3 (ref 0–0.4)
EOSINOPHIL NFR BLD AUTO: 2.3 % (ref 0.3–6.2)
ERYTHROCYTE [DISTWIDTH] IN BLOOD BY AUTOMATED COUNT: 16.3 % (ref 12.3–15.4)
GFR SERPL CREATININE-BSD FRML MDRD: 72 ML/MIN/1.73
GLUCOSE SERPL-MCNC: 110 MG/DL (ref 65–99)
HCT VFR BLD AUTO: 39.3 % (ref 37.5–51)
HGB BLD-MCNC: 12.4 G/DL (ref 13–17.7)
IMM GRANULOCYTES # BLD AUTO: 0.07 10*3/MM3 (ref 0–0.05)
IMM GRANULOCYTES NFR BLD AUTO: 0.8 % (ref 0–0.5)
INR PPP: 1.23 (ref 0.9–1.1)
LYMPHOCYTES # BLD AUTO: 2.47 10*3/MM3 (ref 0.7–3.1)
LYMPHOCYTES NFR BLD AUTO: 28 % (ref 19.6–45.3)
MCH RBC QN AUTO: 29.2 PG (ref 26.6–33)
MCHC RBC AUTO-ENTMCNC: 31.6 G/DL (ref 31.5–35.7)
MCV RBC AUTO: 92.5 FL (ref 79–97)
MONOCYTES # BLD AUTO: 1.15 10*3/MM3 (ref 0.1–0.9)
MONOCYTES NFR BLD AUTO: 13 % (ref 5–12)
NEUTROPHILS NFR BLD AUTO: 4.89 10*3/MM3 (ref 1.7–7)
NEUTROPHILS NFR BLD AUTO: 55.3 % (ref 42.7–76)
NRBC BLD AUTO-RTO: 0.3 /100 WBC (ref 0–0.2)
PLATELET # BLD AUTO: 416 10*3/MM3 (ref 140–450)
PMV BLD AUTO: 10.5 FL (ref 6–12)
POTASSIUM SERPL-SCNC: 3.6 MMOL/L (ref 3.5–5.2)
PROTHROMBIN TIME: 15.3 SECONDS (ref 11.9–14.1)
RBC # BLD AUTO: 4.25 10*6/MM3 (ref 4.14–5.8)
SODIUM SERPL-SCNC: 140 MMOL/L (ref 136–145)
WBC # BLD AUTO: 8.83 10*3/MM3 (ref 3.4–10.8)

## 2020-10-04 PROCEDURE — 85730 THROMBOPLASTIN TIME PARTIAL: CPT | Performed by: EMERGENCY MEDICINE

## 2020-10-04 PROCEDURE — 80048 BASIC METABOLIC PNL TOTAL CA: CPT | Performed by: EMERGENCY MEDICINE

## 2020-10-04 PROCEDURE — 85610 PROTHROMBIN TIME: CPT | Performed by: EMERGENCY MEDICINE

## 2020-10-04 PROCEDURE — 85025 COMPLETE CBC W/AUTO DIFF WBC: CPT | Performed by: EMERGENCY MEDICINE

## 2020-10-04 PROCEDURE — 36415 COLL VENOUS BLD VENIPUNCTURE: CPT

## 2020-10-04 NOTE — ED NOTES
Called [facility] spoke with Zoe and requested a truck back to [facility]. She will dispatch it out.      Symes, Heather  10/04/20 0156

## 2020-10-04 NOTE — ED PROVIDER NOTES
Subjective   87-year-old male history of CVA and expressive aphasia presents from nursing home with epistaxis.  He recently suffered a femoral neck fracture and at that time was discovered to have asymptomatic COVID-19 infection.  At discharge he was started on prophylactic doses of Eliquis.  This evening he began having a nosebleed from the left nare.  It ultimately has stopped with direct pressure.  The patient is in no acute distress with normal vital signs.      History provided by:  EMS personnel and medical records  History limited by:  Patient nonverbal   used: No        Review of Systems   Unable to perform ROS: Patient nonverbal       Past Medical History:   Diagnosis Date   • Abnormal CT of the abdomen 6/19/2016   • Alzheimer's disease with late onset (CMS/HCC)    • Anxiety    • Atelectasis    • Atherosclerotic heart disease    • Cardiomyopathy (CMS/HCC)    • Colitis 6/19/2016   • CVA (cerebral vascular accident) (CMS/HCC)    • Diverticulosis    • Dysphagia    • GERD (gastroesophageal reflux disease)    • Hemiplegia (CMS/HCC)    • History of transfusion    • Hyperlipidemia    • Hypertension    • Hypertension    • Hypokalemia    • Inguinal hernia    • Intracardiac thrombosis, not elsewhere classified    • Iron deficiency anemia    • Kidney stone    • Malnutrition (CMS/HCC)    • Muscle weakness    • Neuromuscular dysfunction of bladder    • Pancreatitis        Allergies   Allergen Reactions   • Penicillins Unknown - Low Severity       Past Surgical History:   Procedure Laterality Date   • COLONOSCOPY N/A 11/9/2016    Procedure: COLONOSCOPY CPT CODE: 42983;  Surgeon: Lilliam Wilson DO;  Location: [facility];  Service:    • ENDOSCOPY N/A 6/17/2016    Procedure: ESOPHAGOGASTRODUODENOSCOPY;  Surgeon: Alex Robles III, MD;  Location: [facility];  Service:    • ENDOSCOPY N/A 11/9/2016    Procedure: ESOPHAGOGASTRODUODENOSCOPY WITH BIOPSY CPT CODE: 24273;  Surgeon: Lilliam Wilson  DO;  [facility];  Service:    • ESOPHAGEAL DILATATION     • HIP BIPOLAR REPLACEMENT Right 9/12/2020    Procedure: HIP BIPOLAR REPLACEMENT;  Surgeon: Gustavo Araiza MD;  Location: [facility] OR;  Service: Orthopedics;  Laterality: Right;       Family History   Problem Relation Age of Onset   • Stroke Mother    • Heart disease Brother        Social History     Socioeconomic History   • Marital status:      Spouse name: Not on file   • Number of children: Not on file   • Years of education: Not on file   • Highest education level: Not on file   Tobacco Use   • Smoking status: Never Smoker   • Smokeless tobacco: Never Used   Substance and Sexual Activity   • Alcohol use: No   • Drug use: No   • Sexual activity: Defer           Objective   Physical Exam  Vitals signs and nursing note reviewed.   Constitutional:       General: He is not in acute distress.     Appearance: He is well-developed. He is not diaphoretic.   HENT:      Head: Normocephalic and atraumatic.      Right Ear: External ear normal.      Left Ear: External ear normal.      Nose:      Comments: Dried blood in L nare, no active bleeding, no blood posterior oropharynx  Eyes:      Conjunctiva/sclera: Conjunctivae normal.      Pupils: Pupils are equal, round, and reactive to light.   Neck:      Musculoskeletal: Normal range of motion and neck supple.      Vascular: No JVD.      Trachea: No tracheal deviation.   Cardiovascular:      Rate and Rhythm: Normal rate and regular rhythm.      Heart sounds: Normal heart sounds. No murmur.   Pulmonary:      Effort: Pulmonary effort is normal. No respiratory distress.      Breath sounds: Normal breath sounds. No wheezing.   Abdominal:      General: Bowel sounds are normal.      Palpations: Abdomen is soft.      Tenderness: There is no abdominal tenderness.   Musculoskeletal: Normal range of motion.         General: No deformity.   Skin:     General: Skin is warm and dry.      Coloration: Skin is not pale.       Findings: No erythema or rash.   Neurological:      Mental Status: He is alert. Mental status is at baseline.      Motor: Weakness (baseline L hemiparesis) present.   Psychiatric:         Behavior: Behavior normal.         Thought Content: Thought content normal.         Procedures           ED Course                                           MDM  Number of Diagnoses or Management Options  Anticoagulated:   Epistaxis:   Diagnosis management comments: There has been no recurrent bleeding here and no further intervention needed.  He is likely having this because of the Eliquis.  Hemoglobin is stable.  Instructions provided for symptomatic management and return precautions.       Amount and/or Complexity of Data Reviewed  Clinical lab tests: reviewed and ordered  Review and summarize past medical records: yes        Final diagnoses:   Epistaxis   Anticoagulated            Flex Basilio MD  10/04/20 0149

## 2022-01-28 ENCOUNTER — LAB REQUISITION (OUTPATIENT)
Dept: LAB | Facility: HOSPITAL | Age: 87
End: 2022-01-28

## 2022-01-28 DIAGNOSIS — U07.1 COVID-19: ICD-10-CM

## 2022-01-28 LAB
ALBUMIN SERPL-MCNC: 3.95 G/DL (ref 3.5–5.2)
ALBUMIN/GLOB SERPL: 1.3 G/DL
ALP SERPL-CCNC: 48 U/L (ref 39–117)
ALT SERPL W P-5'-P-CCNC: 10 U/L (ref 1–41)
ANION GAP SERPL CALCULATED.3IONS-SCNC: 11.3 MMOL/L (ref 5–15)
AST SERPL-CCNC: 16 U/L (ref 1–40)
BASOPHILS # BLD AUTO: 0.1 10*3/MM3 (ref 0–0.2)
BASOPHILS NFR BLD AUTO: 1 % (ref 0–1.5)
BILIRUB SERPL-MCNC: 0.3 MG/DL (ref 0–1.2)
BUN SERPL-MCNC: 27 MG/DL (ref 8–23)
BUN/CREAT SERPL: 14.4 (ref 7–25)
CALCIUM SPEC-SCNC: 8.7 MG/DL (ref 8.6–10.5)
CHLORIDE SERPL-SCNC: 107 MMOL/L (ref 98–107)
CO2 SERPL-SCNC: 20.7 MMOL/L (ref 22–29)
CREAT SERPL-MCNC: 1.88 MG/DL (ref 0.76–1.27)
CRP SERPL-MCNC: 0.88 MG/DL (ref 0–0.5)
DEPRECATED RDW RBC AUTO: 53.3 FL (ref 37–54)
EOSINOPHIL # BLD AUTO: 0.23 10*3/MM3 (ref 0–0.4)
EOSINOPHIL NFR BLD AUTO: 2.4 % (ref 0.3–6.2)
ERYTHROCYTE [DISTWIDTH] IN BLOOD BY AUTOMATED COUNT: 16.2 % (ref 12.3–15.4)
GFR SERPL CREATININE-BSD FRML MDRD: 34 ML/MIN/1.73
GLOBULIN UR ELPH-MCNC: 3.1 GM/DL
GLUCOSE SERPL-MCNC: 105 MG/DL (ref 65–99)
HCT VFR BLD AUTO: 41.4 % (ref 37.5–51)
HGB BLD-MCNC: 13.4 G/DL (ref 13–17.7)
IMM GRANULOCYTES # BLD AUTO: 0.04 10*3/MM3 (ref 0–0.05)
IMM GRANULOCYTES NFR BLD AUTO: 0.4 % (ref 0–0.5)
LYMPHOCYTES # BLD AUTO: 3.37 10*3/MM3 (ref 0.7–3.1)
LYMPHOCYTES NFR BLD AUTO: 34.8 % (ref 19.6–45.3)
MCH RBC QN AUTO: 29.2 PG (ref 26.6–33)
MCHC RBC AUTO-ENTMCNC: 32.4 G/DL (ref 31.5–35.7)
MCV RBC AUTO: 90.2 FL (ref 79–97)
MONOCYTES # BLD AUTO: 1.88 10*3/MM3 (ref 0.1–0.9)
MONOCYTES NFR BLD AUTO: 19.4 % (ref 5–12)
NEUTROPHILS NFR BLD AUTO: 4.07 10*3/MM3 (ref 1.7–7)
NEUTROPHILS NFR BLD AUTO: 42 % (ref 42.7–76)
NRBC BLD AUTO-RTO: 0 /100 WBC (ref 0–0.2)
PLATELET # BLD AUTO: 245 10*3/MM3 (ref 140–450)
PMV BLD AUTO: 11.1 FL (ref 6–12)
POTASSIUM SERPL-SCNC: 4.2 MMOL/L (ref 3.5–5.2)
PROT SERPL-MCNC: 7 G/DL (ref 6–8.5)
RBC # BLD AUTO: 4.59 10*6/MM3 (ref 4.14–5.8)
SODIUM SERPL-SCNC: 139 MMOL/L (ref 136–145)
WBC NRBC COR # BLD: 9.69 10*3/MM3 (ref 3.4–10.8)

## 2022-01-28 PROCEDURE — 80053 COMPREHEN METABOLIC PANEL: CPT | Performed by: INTERNAL MEDICINE

## 2022-01-28 PROCEDURE — 85025 COMPLETE CBC W/AUTO DIFF WBC: CPT | Performed by: INTERNAL MEDICINE

## 2022-01-28 PROCEDURE — 86140 C-REACTIVE PROTEIN: CPT | Performed by: INTERNAL MEDICINE

## 2022-05-09 ENCOUNTER — TRANSCRIBE ORDERS (OUTPATIENT)
Dept: ADMINISTRATIVE | Facility: HOSPITAL | Age: 87
End: 2022-05-09

## 2022-05-09 DIAGNOSIS — R13.10 DYSPHAGIA, UNSPECIFIED TYPE: Primary | ICD-10-CM

## 2022-05-12 ENCOUNTER — HOSPITAL ENCOUNTER (OUTPATIENT)
Dept: GENERAL RADIOLOGY | Facility: HOSPITAL | Age: 87
Discharge: HOME | End: 2022-05-12
Admitting: INTERNAL MEDICINE

## 2022-05-12 DIAGNOSIS — R13.10 DYSPHAGIA, UNSPECIFIED TYPE: ICD-10-CM

## 2022-05-12 PROCEDURE — 92611 MOTION FLUOROSCOPY/SWALLOW: CPT

## 2022-05-12 PROCEDURE — 74230 X-RAY XM SWLNG FUNCJ C+: CPT

## 2022-05-12 PROCEDURE — 74230 X-RAY XM SWLNG FUNCJ C+: CPT | Performed by: RADIOLOGY

## 2022-05-12 NOTE — MBS/VFSS/FEES
SNF - Speech Language Pathology   Swallow Initial Evaluation  Drew   MODIFIED BARIUM SWALLOW STUDY     Patient Name: Demarcus Pastor  : 1933  MRN: [id number]  Today's Date: 2022             Admit Date: 2022    Demarcus Pastor  presents to the radiology suite this am from [facility] to participate in an instrumental MBS to assess safety/efficacy of swallowing fnx, determine safest/least restrictive diet.      He has a medical hx significant for aphasia following CVA, diverticulosis of large intestine, GERD, Bilateral inguinal hernia, Alzheimer's disease, dysphagia, chronic pancreatitis, chronic kidney disease, ischemic cardiomyopathy, and anemia.     He is familiar to speech therapy department from previous instrumental dysphagia evaluations w/ most recent being 20 w/ noted moderate oral and mild-moderate pharyngeal dysphagia w/ aspiration of thin liquids, intermittently silent.  He was recommended for his well established premorbid baseline modified po diet of puree solids and nectar thick liquids.     No recent chest xray available for review.     Pt is observed on room air w/o complications. His son accompanies him on this date.     Risks and benefits of the procedure are explained w/ son verbalizing understanding/agreement to participate.     Pt is positioned upright and centered in a soft back armed chair to accept multiple po presentations of solid cracker, puree, honey thick, nectar thick, and thin liquids via spoon, cup and straw. Pt is able to self feed.     All views are from the lateral plane.     Facial/oral structures are symmetrical upon observation w/o significant lingual deviation noted. Oral mucosa are moist, pink and clean. Secretions are clear, thin and well controlled. OROM/LOUIE appears to be mild to moderately weak but wfl. He does not attempt to imitate oral postures. Gag is not assessed. He does not demonstrate volitional cough. Vocal quality is adequate in  intensity, however he does not attempt to produce speech. Pt is a/a and cooperative to particpate.  Unable to determine orientation per baseline Alzheimer's and aphasia. He does not follow simple directives, or participate in simple conversational exchanges per his baseline status.     Upon po presentations, adequate bolus anticipation w/ generalized weakness of labial seal for bolus clearance via spoon bowl, cup rim stability and suction via straw.  Bolus formation, manipulation, and control are mild to moderately prolonged w/ primarily phasic mastication pattern. A-p transit is delayed w/ oral residue of solids and honey thick liquids remaining, however pt performs spontaneous second swallow to clear. Linguavelar seal is adequate. Tongue base retraction is delayed w/ premature spillage to the valleculae w/ puree, honey, and nectar liquids via straw. Premature spillage to the bilateral pyriforms is noted w/ nectar liquids via cup and thin liquids. Laryngeal penetration is evidenced before the swallow w/ nectar thick liquids via spoon, and thin liquids via spoon and straw. No aspiration before the swallow.     Pharyngeal swallow is slightly delayed w/ weak hyolaryngeal elevation and delayed epiglottic inversion. Penetration w/ aspiration during the swallow w/ thin liquids via all presentations. Cough response elicited w/ single presentation of thin liquids via straw only. Pharyngeal contraction is mildly weak w/ residue remaining in valleculae w/ puree, honey, and nectar liquid trials. No other laryngeal penetration or aspiration evidenced during or after the swallow.     Full esophageal sweep reveals no mucosal abnormalities. Motility is wfl w/o retrograde flow noted during this evaluation.       Visit Dx:     ICD-10-CM ICD-9-CM   1. Dysphagia, unspecified type  R13.10 787.20     Patient Active Problem List   Diagnosis   • Esophageal stricture   • Hypertension   • Chronic back pain   • Weight loss   • Dementia  (HCC)   • Elevated LFTs   • Dysphagia   • Colitis   • Abnormal CT of the abdomen   • Malnutrition (HCC)   • Iron deficiency anemia   • Anemia due to vitamin B12 deficiency   • Hypokalemia   • Late onset 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 (HCC)   • Anemia   • Closed fracture of neck of right femur (HCC)     Past Medical History:   Diagnosis Date   • Abnormal CT of the abdomen 6/19/2016   • Alzheimer's disease with late onset (CMS/HCC)    • Anxiety    • Atelectasis    • Atherosclerotic heart disease    • Cardiomyopathy (CMS/HCC)    • Colitis 6/19/2016   • CVA (cerebral vascular accident) (CMS/HCC)    • Diverticulosis    • Dysphagia    • GERD (gastroesophageal reflux disease)    • Hemiplegia (CMS/HCC)    • History of transfusion    • Hyperlipidemia    • Hypertension    • Hypertension    • Hypokalemia    • Inguinal hernia    • Intracardiac thrombosis, not elsewhere classified    • Iron deficiency anemia    • Kidney stone    • Malnutrition (CMS/HCC)    • Muscle weakness    • Neuromuscular dysfunction of bladder    • Pancreatitis      Past Surgical History:   Procedure Laterality Date   • COLONOSCOPY N/A 11/9/2016    Procedure: COLONOSCOPY CPT CODE: 80408;  Surgeon: Lilliam Wilson DO;  Location: [facility];  Service:    • ENDOSCOPY N/A 6/17/2016    Procedure: ESOPHAGOGASTRODUODENOSCOPY;  Surgeon: Alex Robles III, MD;  Location: [facility] OR;  Service:    • ENDOSCOPY N/A 11/9/2016    Procedure: ESOPHAGOGASTRODUODENOSCOPY WITH BIOPSY CPT CODE: 42918;  Surgeon: Lilliam Wilson DO;  Location: [facility] OR;  Service:    • ESOPHAGEAL DILATATION     • HIP BIPOLAR REPLACEMENT Right 9/12/2020    Procedure: HIP BIPOLAR REPLACEMENT;  Surgeon: Gustavo Araiza MD;  Location: [facility] OR;  Service: Orthopedics;  Laterality: Right;       IMPRESSION:  Mr Pastor presents w/ continued moderate oral dysphagia and mild to moderate pharyngeal dysphagia w/ intermittent silent aspiration of thin liquids. He is felt to  most benefit from a continued modified po diet of puree solids and nectar thick liquids. Per findings from multiple previous instrumental dysphagia evaluations, current Alzheimers dementia, and previous CVA, this is felt to be his premorbid baseline po diet.     SLP Recommendation and Plan       Recommendations:   1. Puree consistency diet, NECTAR thick liquids.   2. Meds crushed in puree.   3. Humble precautions.   4. Oral care protocol.   5. Universal aspiration precautions.   6. 1:1 feeding assist as needed.     D/w pt and son results and recommendations w/ verbal understanding and agreement.     Thank you for allowing me to participate in the care of your patient-  Jazmin Razo M.S., CCC-SLP        Section K & L                   EDUCATION  The patient has been educated in the following areas:   Dysphagia (Swallowing Impairment).           Time Calculation:       Therapy Charges for Today     Code Description Service Date Service Provider Modifiers Qty    [id number] HC ST MOTION FLUORO EVAL SWALLOW 8 5/12/2022 Jazmin Razo, MS CCC-SLP GN 1               Jazmin Razo MS CCC-SLP  5/12/2022

## 2022-11-17 ENCOUNTER — LAB REQUISITION (OUTPATIENT)
Dept: LAB | Facility: HOSPITAL | Age: 87
End: 2022-11-17

## 2022-11-17 DIAGNOSIS — R50.9 FEVER, UNSPECIFIED: ICD-10-CM

## 2022-11-17 DIAGNOSIS — R06.00 DYSPNEA, UNSPECIFIED: ICD-10-CM

## 2022-11-17 LAB
B PARAPERT DNA SPEC QL NAA+PROBE: NOT DETECTED
B PERT DNA SPEC QL NAA+PROBE: NOT DETECTED
C PNEUM DNA NPH QL NAA+NON-PROBE: NOT DETECTED
FLUAV SUBTYP SPEC NAA+PROBE: NOT DETECTED
FLUBV RNA ISLT QL NAA+PROBE: NOT DETECTED
HADV DNA SPEC NAA+PROBE: NOT DETECTED
HCOV 229E RNA SPEC QL NAA+PROBE: NOT DETECTED
HCOV HKU1 RNA SPEC QL NAA+PROBE: NOT DETECTED
HCOV NL63 RNA SPEC QL NAA+PROBE: NOT DETECTED
HCOV OC43 RNA SPEC QL NAA+PROBE: NOT DETECTED
HMPV RNA NPH QL NAA+NON-PROBE: NOT DETECTED
HPIV1 RNA ISLT QL NAA+PROBE: NOT DETECTED
HPIV2 RNA SPEC QL NAA+PROBE: NOT DETECTED
HPIV3 RNA NPH QL NAA+PROBE: NOT DETECTED
HPIV4 P GENE NPH QL NAA+PROBE: NOT DETECTED
M PNEUMO IGG SER IA-ACNC: NOT DETECTED
RHINOVIRUS RNA SPEC NAA+PROBE: DETECTED
RSV RNA NPH QL NAA+NON-PROBE: NOT DETECTED
SARS-COV-2 RNA NPH QL NAA+NON-PROBE: NOT DETECTED

## 2022-11-17 PROCEDURE — 0202U NFCT DS 22 TRGT SARS-COV-2: CPT | Performed by: INTERNAL MEDICINE

## 2023-03-23 ENCOUNTER — APPOINTMENT (OUTPATIENT)
Dept: CT IMAGING | Facility: HOSPITAL | Age: 88
End: 2023-03-23
Payer: MEDICARE

## 2023-03-23 ENCOUNTER — HOSPITAL ENCOUNTER (EMERGENCY)
Facility: HOSPITAL | Age: 88
Discharge: NURSING, REHAB OR LONG-TERM CARE | End: 2023-03-23
Attending: STUDENT IN AN ORGANIZED HEALTH CARE EDUCATION/TRAINING PROGRAM | Admitting: STUDENT IN AN ORGANIZED HEALTH CARE EDUCATION/TRAINING PROGRAM
Payer: MEDICARE

## 2023-03-23 VITALS
HEIGHT: 70 IN | BODY MASS INDEX: 20.04 KG/M2 | OXYGEN SATURATION: 93 % | SYSTOLIC BLOOD PRESSURE: 125 MMHG | RESPIRATION RATE: 16 BRPM | TEMPERATURE: 98.1 F | HEART RATE: 80 BPM | DIASTOLIC BLOOD PRESSURE: 70 MMHG | WEIGHT: 139.99 LBS

## 2023-03-23 DIAGNOSIS — S09.90XA CLOSED HEAD INJURY, INITIAL ENCOUNTER: Primary | ICD-10-CM

## 2023-03-23 PROCEDURE — 70450 CT HEAD/BRAIN W/O DYE: CPT

## 2023-03-23 PROCEDURE — 99283 EMERGENCY DEPT VISIT LOW MDM: CPT

## 2023-03-23 PROCEDURE — 70450 CT HEAD/BRAIN W/O DYE: CPT | Performed by: RADIOLOGY

## 2023-03-23 NOTE — ED PROVIDER NOTES
Subjective   History of Present Illness  Patient is a 89-year-old male from the nursing home who was found to be in the floor this morning.  History is Alzheimer's and is unable to tell if he is hurting patient does not appear to be in any pain or distress.  She does have a small abrasion on the back of his scalp.  Patient not noted to have any other outward signs of injury.  Patient's review of systems is limited due to history of dementia.    Fall      Review of Systems   Unable to perform ROS: Dementia       Past Medical History:   Diagnosis Date   • Abnormal CT of the abdomen 6/19/2016   • Alzheimer's disease with late onset (CMS/HCC)    • Anxiety    • Atelectasis    • Atherosclerotic heart disease    • Cardiomyopathy (CMS/HCC)    • Colitis 6/19/2016   • CVA (cerebral vascular accident) (CMS/HCC)    • Diverticulosis    • Dysphagia    • GERD (gastroesophageal reflux disease)    • Hemiplegia (CMS/HCC)    • History of transfusion    • Hyperlipidemia    • Hypertension    • Hypertension    • Hypokalemia    • Inguinal hernia    • Intracardiac thrombosis, not elsewhere classified    • Iron deficiency anemia    • Kidney stone    • Malnutrition (CMS/HCC)    • Muscle weakness    • Neuromuscular dysfunction of bladder    • Pancreatitis        Allergies   Allergen Reactions   • Penicillins Unknown - Low Severity       Past Surgical History:   Procedure Laterality Date   • COLONOSCOPY N/A 11/9/2016    Procedure: COLONOSCOPY CPT CODE: 71685;  Surgeon: Lilliam Wilson DO;  Location: [facility] OR;  Service:    • ENDOSCOPY N/A 6/17/2016    Procedure: ESOPHAGOGASTRODUODENOSCOPY;  Surgeon: Alex Robles III, MD;  Location: [facility] OR;  Service:    • ENDOSCOPY N/A 11/9/2016    Procedure: ESOPHAGOGASTRODUODENOSCOPY WITH BIOPSY CPT CODE: 75702;  Surgeon: Lilliam Wilson DO;  Location: [facility] OR;  Service:    • ESOPHAGEAL DILATATION     • HIP BIPOLAR REPLACEMENT Right 9/12/2020    Procedure: HIP BIPOLAR REPLACEMENT;   Surgeon: Gustavo Araiza MD;  Location: [facility];  Service: Orthopedics;  Laterality: Right;       Family History   Problem Relation Age of Onset   • Stroke Mother    • Heart disease Brother        Social History     Socioeconomic History   • Marital status:    Tobacco Use   • Smoking status: Never   • Smokeless tobacco: Never   Substance and Sexual Activity   • Alcohol use: No   • Drug use: No   • Sexual activity: Defer           Objective   Physical Exam  Vitals and nursing note reviewed.   Constitutional:       Appearance: He is well-developed.   HENT:      Head: Normocephalic.      Right Ear: External ear normal.      Left Ear: External ear normal.   Eyes:      Conjunctiva/sclera: Conjunctivae normal.      Pupils: Pupils are equal, round, and reactive to light.   Cardiovascular:      Rate and Rhythm: Normal rate and regular rhythm.      Heart sounds: Normal heart sounds.   Pulmonary:      Effort: Pulmonary effort is normal.      Breath sounds: Normal breath sounds.   Abdominal:      General: Bowel sounds are normal.      Palpations: Abdomen is soft.   Musculoskeletal:         General: Normal range of motion.      Cervical back: Normal range of motion and neck supple.   Skin:     General: Skin is warm and dry.      Capillary Refill: Capillary refill takes less than 2 seconds.      Comments: Small abrasion on back of scalp     Neurological:      Mental Status: He is alert and oriented to person, place, and time.   Psychiatric:         Behavior: Behavior normal.         Thought Content: Thought content normal.         Procedures       CT Head Without Contrast   Final Result   1.  Small vessel ischemic/degenerative changes.   2.  Cerebral and cerebellar atrophy.   3.  Left parietal region encephalomalacia again noted.       This report was finalized on 3/23/2023 11:57 AM by Dr. Anthony Pastor MD.                ED Course                                           Medical Decision Making  Patient is a  89-year-old male from the nursing home who was found to be in the floor this morning.  History is Alzheimer's and is unable to tell if he is hurting patient does not appear to be in any pain or distress.  She does have a small abrasion on the back of his scalp.  Patient not noted to have any other outward signs of injury.  Patient's review of systems is limited due to history of dementia.    Closed head injury, initial encounter: acute illness or injury  Amount and/or Complexity of Data Reviewed  Radiology: ordered. Decision-making details documented in ED Course.          Final diagnoses:   Closed head injury, initial encounter       ED Disposition  ED Disposition     ED Disposition   Discharge    Condition   Stable    Comment   --             Umm Butt MD  [address] [place] [id number] [phone]    Schedule an appointment as soon as possible for a visit   For further evaluation         Medication List      No changes were made to your prescriptions during this visit.          Gigi Ramos, APRN  03/24/23 [id number]

## 2023-03-23 NOTE — ED NOTES
Called [facility] again about transport back to [facility], stated they would have [facility] give me a call when they could.

## 2023-03-23 NOTE — ED NOTES
Called [facility] about transport they said transport was accepted but they have been busy and only have 2 trucks but they will come.

## 2024-01-27 ENCOUNTER — HOSPITAL ENCOUNTER (EMERGENCY)
Facility: HOSPITAL | Age: 89
Discharge: HOME | End: 2024-01-27
Attending: STUDENT IN AN ORGANIZED HEALTH CARE EDUCATION/TRAINING PROGRAM
Payer: MEDICARE

## 2024-01-27 ENCOUNTER — APPOINTMENT (OUTPATIENT)
Dept: CT IMAGING | Facility: HOSPITAL | Age: 89
End: 2024-01-27
Payer: MEDICARE

## 2024-01-27 ENCOUNTER — APPOINTMENT (OUTPATIENT)
Dept: GENERAL RADIOLOGY | Facility: HOSPITAL | Age: 89
End: 2024-01-27
Payer: MEDICARE

## 2024-01-27 VITALS
TEMPERATURE: 98.9 F | SYSTOLIC BLOOD PRESSURE: 109 MMHG | OXYGEN SATURATION: 95 % | RESPIRATION RATE: 16 BRPM | DIASTOLIC BLOOD PRESSURE: 52 MMHG | HEIGHT: 70 IN | WEIGHT: 140 LBS | BODY MASS INDEX: 20.04 KG/M2 | HEART RATE: 51 BPM

## 2024-01-27 DIAGNOSIS — R00.1 BRADYCARDIA: ICD-10-CM

## 2024-01-27 DIAGNOSIS — K57.92 DIVERTICULITIS: Primary | ICD-10-CM

## 2024-01-27 LAB
ABO GROUP BLD: NORMAL
ALBUMIN SERPL-MCNC: 3.2 G/DL (ref 3.5–5.2)
ALBUMIN/GLOB SERPL: 1.1 G/DL
ALP SERPL-CCNC: 58 U/L (ref 39–117)
ALT SERPL W P-5'-P-CCNC: 18 U/L (ref 1–41)
ANION GAP SERPL CALCULATED.3IONS-SCNC: 6.4 MMOL/L (ref 5–15)
APTT PPP: 30.2 SECONDS (ref 26.5–34.5)
AST SERPL-CCNC: 27 U/L (ref 1–40)
BASOPHILS # BLD AUTO: 0.08 10*3/MM3 (ref 0–0.2)
BASOPHILS NFR BLD AUTO: 1.1 % (ref 0–1.5)
BILIRUB SERPL-MCNC: 0.3 MG/DL (ref 0–1.2)
BLD GP AB SCN SERPL QL: NEGATIVE
BUN SERPL-MCNC: 19 MG/DL (ref 8–23)
BUN/CREAT SERPL: 16.4 (ref 7–25)
CALCIUM SPEC-SCNC: 8 MG/DL (ref 8.2–9.6)
CHLORIDE SERPL-SCNC: 110 MMOL/L (ref 98–107)
CO2 SERPL-SCNC: 25.6 MMOL/L (ref 22–29)
CREAT SERPL-MCNC: 1.16 MG/DL (ref 0.76–1.27)
D-LACTATE SERPL-SCNC: 1.3 MMOL/L (ref 0.5–2)
DEPRECATED RDW RBC AUTO: 49.1 FL (ref 37–54)
EGFRCR SERPLBLD CKD-EPI 2021: 59.8 ML/MIN/1.73
EOSINOPHIL # BLD AUTO: 0.17 10*3/MM3 (ref 0–0.4)
EOSINOPHIL NFR BLD AUTO: 2.3 % (ref 0.3–6.2)
ERYTHROCYTE [DISTWIDTH] IN BLOOD BY AUTOMATED COUNT: 14.9 % (ref 12.3–15.4)
GLOBULIN UR ELPH-MCNC: 2.9 GM/DL
GLUCOSE SERPL-MCNC: 96 MG/DL (ref 65–99)
HCT VFR BLD AUTO: 39.7 % (ref 37.5–51)
HGB BLD-MCNC: 12.4 G/DL (ref 13–17.7)
IMM GRANULOCYTES # BLD AUTO: 0.05 10*3/MM3 (ref 0–0.05)
IMM GRANULOCYTES NFR BLD AUTO: 0.7 % (ref 0–0.5)
INR PPP: 1.07 (ref 0.9–1.1)
LYMPHOCYTES # BLD AUTO: 2.5 10*3/MM3 (ref 0.7–3.1)
LYMPHOCYTES NFR BLD AUTO: 34.1 % (ref 19.6–45.3)
MCH RBC QN AUTO: 28.1 PG (ref 26.6–33)
MCHC RBC AUTO-ENTMCNC: 31.2 G/DL (ref 31.5–35.7)
MCV RBC AUTO: 89.8 FL (ref 79–97)
MONOCYTES # BLD AUTO: 1.23 10*3/MM3 (ref 0.1–0.9)
MONOCYTES NFR BLD AUTO: 16.8 % (ref 5–12)
NEUTROPHILS NFR BLD AUTO: 3.31 10*3/MM3 (ref 1.7–7)
NEUTROPHILS NFR BLD AUTO: 45 % (ref 42.7–76)
NRBC BLD AUTO-RTO: 0 /100 WBC (ref 0–0.2)
PLATELET # BLD AUTO: 250 10*3/MM3 (ref 140–450)
PMV BLD AUTO: 11.5 FL (ref 6–12)
POTASSIUM SERPL-SCNC: 4 MMOL/L (ref 3.5–5.2)
PROT SERPL-MCNC: 6.1 G/DL (ref 6–8.5)
PROTHROMBIN TIME: 14.5 SECONDS (ref 12.1–14.7)
RBC # BLD AUTO: 4.42 10*6/MM3 (ref 4.14–5.8)
RH BLD: POSITIVE
SODIUM SERPL-SCNC: 142 MMOL/L (ref 136–145)
T&S EXPIRATION DATE: NORMAL
WBC NRBC COR # BLD AUTO: 7.34 10*3/MM3 (ref 3.4–10.8)

## 2024-01-27 PROCEDURE — 83605 ASSAY OF LACTIC ACID: CPT | Performed by: NURSE PRACTITIONER

## 2024-01-27 PROCEDURE — 85025 COMPLETE CBC W/AUTO DIFF WBC: CPT | Performed by: NURSE PRACTITIONER

## 2024-01-27 PROCEDURE — 96360 HYDRATION IV INFUSION INIT: CPT

## 2024-01-27 PROCEDURE — 71045 X-RAY EXAM CHEST 1 VIEW: CPT

## 2024-01-27 PROCEDURE — 86850 RBC ANTIBODY SCREEN: CPT | Performed by: NURSE PRACTITIONER

## 2024-01-27 PROCEDURE — 86901 BLOOD TYPING SEROLOGIC RH(D): CPT | Performed by: NURSE PRACTITIONER

## 2024-01-27 PROCEDURE — 25810000003 SODIUM CHLORIDE 0.9 % SOLUTION: Performed by: NURSE PRACTITIONER

## 2024-01-27 PROCEDURE — 71045 X-RAY EXAM CHEST 1 VIEW: CPT | Performed by: RADIOLOGY

## 2024-01-27 PROCEDURE — 86900 BLOOD TYPING SEROLOGIC ABO: CPT | Performed by: NURSE PRACTITIONER

## 2024-01-27 PROCEDURE — 85610 PROTHROMBIN TIME: CPT | Performed by: NURSE PRACTITIONER

## 2024-01-27 PROCEDURE — 87040 BLOOD CULTURE FOR BACTERIA: CPT | Performed by: NURSE PRACTITIONER

## 2024-01-27 PROCEDURE — 74176 CT ABD & PELVIS W/O CONTRAST: CPT | Performed by: RADIOLOGY

## 2024-01-27 PROCEDURE — 74176 CT ABD & PELVIS W/O CONTRAST: CPT

## 2024-01-27 PROCEDURE — 36415 COLL VENOUS BLD VENIPUNCTURE: CPT

## 2024-01-27 PROCEDURE — 80053 COMPREHEN METABOLIC PANEL: CPT | Performed by: NURSE PRACTITIONER

## 2024-01-27 PROCEDURE — 99284 EMERGENCY DEPT VISIT MOD MDM: CPT

## 2024-01-27 PROCEDURE — 85730 THROMBOPLASTIN TIME PARTIAL: CPT | Performed by: NURSE PRACTITIONER

## 2024-01-27 RX ORDER — METRONIDAZOLE 500 MG/1
500 TABLET ORAL 3 TIMES DAILY
Qty: 21 TABLET | Refills: 0 | Status: SHIPPED | OUTPATIENT
Start: 2024-01-27 | End: 2024-02-03

## 2024-01-27 RX ORDER — SODIUM CHLORIDE 0.9 % (FLUSH) 0.9 %
10 SYRINGE (ML) INJECTION AS NEEDED
Status: DISCONTINUED | OUTPATIENT
Start: 2024-01-27 | End: 2024-01-27 | Stop reason: HOSPADM

## 2024-01-27 RX ORDER — CIPROFLOXACIN 500 MG/1
500 TABLET, FILM COATED ORAL 2 TIMES DAILY
Qty: 20 TABLET | Refills: 0 | Status: SHIPPED | OUTPATIENT
Start: 2024-01-27

## 2024-01-27 RX ADMIN — SODIUM CHLORIDE 250 ML: 9 INJECTION, SOLUTION INTRAVENOUS at 08:25

## 2024-01-27 NOTE — ED NOTES
Report given to Crystal at [facility]. Pt is stable at this time. Reports no pain. Pt is awaiting on transport back to nursing home at this time.

## 2024-01-27 NOTE — ED PROVIDER NOTES
Subjective   History of Present Illness  Patient is a 89-year-old male from [facility] who was sent due to vomiting coffee-ground emesis x 2.  Nursing home nurse reports that this occurred twice this morning.  Patient is a DNR with a power of .  Has a history of Alzheimer's disease, anxiety, atherosclerotic heart disease, cardiomyopathy, colitis, CVA, hyperlipidemia, hypertension, intracardiac thrombosis, iron deficiency anemia, malnutrition, muscle weakness, pancreatitis, neuromuscular dysfunction of the bladder.        Review of Systems   Constitutional: Negative.    HENT: Negative.     Eyes: Negative.    Respiratory: Negative.     Cardiovascular: Negative.    Gastrointestinal: Negative.    Endocrine: Negative.    Genitourinary: Negative.    Musculoskeletal: Negative.    Skin: Negative.    Allergic/Immunologic: Negative.    Neurological: Negative.    Hematological: Negative.    Psychiatric/Behavioral: Negative.         Past Medical History:   Diagnosis Date    Abnormal CT of the abdomen 6/19/2016    Alzheimer's disease with late onset     Anxiety     Atelectasis     Atherosclerotic heart disease     Cardiomyopathy     Colitis 6/19/2016    CVA (cerebral vascular accident)     Diverticulosis     Dysphagia     GERD (gastroesophageal reflux disease)     Hemiplegia     History of transfusion     Hyperlipidemia     Hypertension     Hypertension     Hypokalemia     Inguinal hernia     Intracardiac thrombosis, not elsewhere classified     Iron deficiency anemia     Kidney stone     Malnutrition     Muscle weakness     Neuromuscular dysfunction of bladder     Pancreatitis        Allergies   Allergen Reactions    Penicillins Unknown - Low Severity       Past Surgical History:   Procedure Laterality Date    COLONOSCOPY N/A 11/9/2016    Procedure: COLONOSCOPY CPT CODE: 11456;  Surgeon: Lilliam Wilson DO;  Location: [facility];  Service:     ENDOSCOPY N/A 6/17/2016    Procedure:  ESOPHAGOGASTRODUODENOSCOPY;  Surgeon: Alex Robles III, MD;  Location: [facility] OR;  Service:     ENDOSCOPY N/A 11/9/2016    Procedure: ESOPHAGOGASTRODUODENOSCOPY WITH BIOPSY CPT CODE: 64025;  Surgeon: Lilliam Wilson DO;  Location: [facility] OR;  Service:     ESOPHAGEAL DILATATION      HIP BIPOLAR REPLACEMENT Right 9/12/2020    Procedure: HIP BIPOLAR REPLACEMENT;  Surgeon: Gustavo Araiza MD;  Location: [facility] OR;  Service: Orthopedics;  Laterality: Right;       Family History   Problem Relation Age of Onset    Stroke Mother     Heart disease Brother        Social History     Socioeconomic History    Marital status:    Tobacco Use    Smoking status: Never    Smokeless tobacco: Never   Substance and Sexual Activity    Alcohol use: No    Drug use: No    Sexual activity: Defer           Objective   Physical Exam  Vitals and nursing note reviewed.   Constitutional:       Appearance: He is well-developed.   HENT:      Head: Normocephalic.      Right Ear: External ear normal.      Left Ear: External ear normal.   Eyes:      Conjunctiva/sclera: Conjunctivae normal.      Pupils: Pupils are equal, round, and reactive to light.   Cardiovascular:      Rate and Rhythm: Normal rate and regular rhythm.      Heart sounds: Normal heart sounds.   Pulmonary:      Effort: Pulmonary effort is normal.      Breath sounds: Normal breath sounds.   Abdominal:      General: Bowel sounds are normal.      Palpations: Abdomen is soft.   Musculoskeletal:         General: Normal range of motion.      Cervical back: Normal range of motion and neck supple.   Skin:     General: Skin is warm and dry.      Capillary Refill: Capillary refill takes less than 2 seconds.   Neurological:      Mental Status: He is alert and oriented to person, place, and time.         Procedures       Electronically signed by THAO Gould, 01/27/24, 7:21 AM EST.       ED Course  ED Course as of 01/29/24 0115   Sat Jan 27, 2024   0721 Endorsed to  Dr. Ball [KINGA]      ED Course User Index  [KINGA] Gigi Ramos, THAO                                             Medical Decision Making  Problems Addressed:  Bradycardia: complicated acute illness or injury  Diverticulitis: complicated acute illness or injury    Amount and/or Complexity of Data Reviewed  Labs: ordered.  Radiology: ordered.    Risk  Prescription drug management.        Final diagnoses:   Diverticulitis   Bradycardia       ED Disposition  ED Disposition       ED Disposition   Discharge    Condition   Stable    Comment   --               Umm Butt MD  [address]  [facility] [address]  [phone]    Schedule an appointment as soon as possible for a visit in 1 day  EVALUATE         Medication List        New Prescriptions      ciprofloxacin 500 MG tablet  Commonly known as: CIPRO  Take 1 tablet by mouth 2 (Two) Times a Day.     metroNIDAZOLE 500 MG tablet  Commonly known as: FLAGYL  Take 1 tablet by mouth 3 (Three) Times a Day for 7 days.               Where to Get Your Medications        These medications were sent to [facility] - [place] - [address] - [phone]  - [phone] FX  [address] [place] [address]      Phone: [phone]   ciprofloxacin 500 MG tablet  metroNIDAZOLE 500 MG tablet            Tony Ball MD  01/29/24 0115

## 2024-02-01 LAB
BACTERIA SPEC AEROBE CULT: NORMAL
BACTERIA SPEC AEROBE CULT: NORMAL

## 2024-04-25 ENCOUNTER — OFFICE VISIT (OUTPATIENT)
Dept: CARDIOLOGY | Facility: CLINIC | Age: 89
End: 2024-04-25
Payer: MEDICARE

## 2024-04-25 VITALS
BODY MASS INDEX: 17.55 KG/M2 | SYSTOLIC BLOOD PRESSURE: 146 MMHG | HEIGHT: 70 IN | WEIGHT: 122.6 LBS | HEART RATE: 53 BPM | OXYGEN SATURATION: 96 % | DIASTOLIC BLOOD PRESSURE: 79 MMHG

## 2024-04-25 DIAGNOSIS — I73.9 PERIPHERAL ARTERY DISEASE: Primary | ICD-10-CM

## 2024-04-25 RX ORDER — LEVOTHYROXINE SODIUM 0.1 MG/1
100 TABLET ORAL DAILY
COMMUNITY

## 2024-04-25 NOTE — PROGRESS NOTES
[facility] CARDIOLOGY  [address] [facility] [place] [address]  Phone: [phone]  Fax: [phone]    04/25/2024    Chief Complaint   Patient presents with    Establish Care     Dx of PVD, Intracardiac thrombosis,HTN,hyperlipidemia,CKD,GERD.        History:   Demarcus Pastor is a 89 y.o. male seen in consultation, referred by Dr.Maria PAULA Butt MD  for evaluation of peripheral artery disease.  He had an arterial ultrasound on the right leg showing monophasic flow in the right side suggestive of inflow disease along with poor flow in the tibial .   He is in a nursing home and he denies symptoms.  He denies feeling his feet are cold he denies any rest leg pain he denies any claudication.            Past Medical History:   Diagnosis Date    Abnormal CT of the abdomen 6/19/2016    Alzheimer's disease with late onset     Anxiety     Atelectasis     Atherosclerotic heart disease     Cardiomyopathy     Colitis 6/19/2016    CVA (cerebral vascular accident)     Diverticulosis     Dysphagia     GERD (gastroesophageal reflux disease)     Hemiplegia     History of transfusion     Hyperlipidemia     Hypertension     Hypertension     Hypokalemia     Inguinal hernia     Intracardiac thrombosis, not elsewhere classified     Iron deficiency anemia     Kidney stone     Malnutrition     Muscle weakness     Neuromuscular dysfunction of bladder     Pancreatitis        Past Surgical History:   Procedure Laterality Date    COLONOSCOPY N/A 11/9/2016    Procedure: COLONOSCOPY CPT CODE: 10340;  Surgeon: Lilliam Wilson DO;  Location: [facility] OR;  Service:     ENDOSCOPY N/A 6/17/2016    Procedure: ESOPHAGOGASTRODUODENOSCOPY;  Surgeon: Alex Robles III, MD;  Location: [facility] OR;  Service:     ENDOSCOPY N/A 11/9/2016    Procedure: ESOPHAGOGASTRODUODENOSCOPY WITH BIOPSY CPT CODE: 25723;  Surgeon: Lilliam Wilson DO;  Location: [facility] OR;  Service:     ESOPHAGEAL DILATATION      HIP BIPOLAR  REPLACEMENT Right 9/12/2020    Procedure: HIP BIPOLAR REPLACEMENT;  Surgeon: Gustavo Araiza MD;  Location: [facility];  Service: Orthopedics;  Laterality: Right;        ROS  As above otherwise negative    Past Social History:  Social History     Socioeconomic History    Marital status:    Tobacco Use    Smoking status: Never    Smokeless tobacco: Never   Substance and Sexual Activity    Alcohol use: No    Drug use: No    Sexual activity: Defer       Past Family History:  Family History   Problem Relation Age of Onset    Stroke Mother     Heart disease Brother        Current Outpatient Medications   Medication Sig Dispense Refill    acetaminophen (TYLENOL) 500 MG tablet Take 1 tablet by mouth Every 4 (Four) Hours As Needed for Mild Pain.      aspirin 81 MG EC tablet Take 1 tablet by mouth Daily.      atorvastatin (LIPITOR) 40 MG tablet Take 1 tablet by mouth Daily. (Patient taking differently: Take 2 tablets by mouth Daily.) 30 tablet 0    carvedilol (COREG) 6.25 MG tablet Take 0.5 tablets by mouth 2 (Two) Times a Day With Meals for 30 days. (Patient taking differently: Take 0.5 tablets by mouth 2 (Two) Times a Day With Meals. 3.125 BID) 30 tablet 0    clopidogrel (PLAVIX) 75 MG tablet Take 1 tablet by mouth Daily.      cyanocobalamin 1000 MCG/ML injection Inject 1 mL into the appropriate muscle as directed by prescriber Every 30 (Thirty) Days.      finasteride (PROSCAR) 5 MG tablet Take 1 tablet by mouth Daily.      HYDROcodone-acetaminophen (NORCO) 5-325 MG per tablet Take 1 tablet by mouth Every 8 (Eight) Hours As Needed for Moderate Pain . 10 tablet 0    levothyroxine (SYNTHROID, LEVOTHROID) 100 MCG tablet Take 1 tablet by mouth Daily.      pantoprazole (PROTONIX) 40 MG EC tablet Take 1 tablet by mouth Every Night.      sacubitril-valsartan (ENTRESTO)  MG tablet Take 1 tablet by mouth 2 (Two) Times a Day.      tamsulosin (FLOMAX) 0.4 MG capsule 24 hr capsule Take 1 capsule by mouth Daily.       ciprofloxacin (CIPRO) 500 MG tablet Take 1 tablet by mouth 2 (Two) Times a Day. (Patient not taking: Reported on 4/25/2024) 20 tablet 0    hydrOXYzine (ATARAX) 25 MG tablet Take 25 mg by mouth Every Night. (Patient not taking: Reported on 4/25/2024)      spironolactone (ALDACTONE) 25 MG tablet Take 12.5 mg by mouth Daily. (Patient not taking: Reported on 4/25/2024)      thiamine (VITAMIN B-1) 100 MG tablet Take 100 mg by mouth Daily. (Patient not taking: Reported on 4/25/2024)      vitamin D (ERGOCALCIFEROL) 1.25 MG (33013 UT) capsule capsule Take 50,000 Units by mouth 1 (One) Time Per Week. (Patient not taking: Reported on 4/25/2024)       No current facility-administered medications for this visit.        Allergies   Allergen Reactions    Penicillins Unknown - Low Severity         Objective:  Vitals:    04/25/24 1514   BP: 146/79   Pulse: 53   SpO2: 96%       Physical Exam  His feet do feel cold and pulses are reduced.  No ulceration seen.  He is wheelchair-bound       DATA:  Results for orders placed during the hospital encounter of 09/11/20    SCANNED - TELEMETRY     Results for orders placed during the hospital encounter of 09/11/20    Adult Transthoracic Echo Complete W/ Cont if Necessary Per Protocol    Interpretation Summary  · Ejection fraction appears to be 36 - 40%. Left ventricular systolic function is moderately decreased. Left ventricular diastolic function is consistent with (grade I) impaired relaxation and age.  · The following left ventricular wall segments are hypokinetic: apical anterior, apical septal, apex hypokinetic and mid anteroseptal.  · There is a trivial pericardial effusion  · No significant changes to previous study      Results for orders placed during the hospital encounter of 11/10/19    US Scrotum & Testicles    Narrative  US Scrotum and Contents    INDICATION:  Left-sided groin and scrotal pain. Suspected left-sided hernia    TECHNIQUE:  Sonographic evaluation of the scrotum.  Color and pulsed wave doppler ultrasound was used to assess testicular vasculature.    COMPARISON:  CT abdomen and pelvis 11/7/2016 and 11/10/2019    FINDINGS:  The right testicle is 2.6 x 4.1 x 2.5 cm. It is normal in appearance with some dilated rete testes visible. There is a cyst superior to the testicle consistent with an epididymal cyst. It measures 2.7 cm in diameter. This is a simple cyst. There is  normal flow in the right testicle. Left testicle measures 2.2 x 3.8 x 3.1 cm and has normal flow and normal echotexture. The left groin was evaluated and some hypoechoic longitudinally oriented material is visible. The CT scan done approximately same  time may show some fat within the inguinal canal hernia. No bowel involvement is noted.    Impression  Both testicles are normal    2.7 cm simple right epididymal head cyst    The ultrasound suggests that there is some fatty material in the inguinal canal with different echotexture than the adjacent fatty tissue and on the CT scan there may be a small amount of fat extending down the inguinal canal to the patient may have a  small fat-containing inguinal hernia which appears unchanged from the 2016 CT scan.    Please note that the initial images on the study are incorrectly labeled right groin when they actually represent the left groin according to the technologist. She is going to re-labeled those images    Signer Name: Oleg Garcia MD  Signed: 11/10/2019 7:16 PM  Workstation Name: [id number]  [facility]      Procedures     Lab Results   Component Value Date    CHOL 90 11/11/2019    CHOL 167 11/09/2016     Lab Results   Component Value Date    TRIG 62 11/11/2019    TRIG 129 11/09/2016     Lab Results   Component Value Date    HDL 33 (L) 11/11/2019    HDL 27 (L) 11/09/2016     Lab Results   Component Value Date    LDL 45 11/11/2019     (H) 11/09/2016       Lab Results   Component Value Date    TSH 5.230 (H) 11/10/2019          "    Invalid input(s): \"LABALBU\", \"PROT\"            Assessment and Plan:    Demarcus Pastor  reports that he has never smoked. He has never used smokeless tobacco.   Patient is wheelchair-bound and having no signs of critical limb ischemia at this point.  Is hard to assess claudication as he is wheelchair-bound.  We will plan to monitor for signs of critical limb.  I have plan to see him back in 3 to 4 months     Diagnosis Plan   1. Peripheral artery disease              Thank you for allowing me to participate in the care of Demarcus Pastor. Feel free to contact me directly with any further questions or concerns.            Steve Walker MD, FACC, [facility]  Interventional Cardiology     "

## 2024-07-25 ENCOUNTER — OFFICE VISIT (OUTPATIENT)
Dept: CARDIOLOGY | Facility: CLINIC | Age: 89
End: 2024-07-25
Payer: MEDICARE

## 2024-07-25 VITALS
WEIGHT: 122 LBS | DIASTOLIC BLOOD PRESSURE: 81 MMHG | SYSTOLIC BLOOD PRESSURE: 143 MMHG | OXYGEN SATURATION: 99 % | HEIGHT: 70 IN | HEART RATE: 58 BPM | BODY MASS INDEX: 17.47 KG/M2

## 2024-07-25 DIAGNOSIS — I50.22 CHRONIC HFREF (HEART FAILURE WITH REDUCED EJECTION FRACTION): Primary | Chronic | ICD-10-CM

## 2024-07-25 DIAGNOSIS — I10 PRIMARY HYPERTENSION: Chronic | ICD-10-CM

## 2024-07-25 RX ORDER — ONDANSETRON 4 MG/1
4 TABLET, FILM COATED ORAL EVERY 8 HOURS PRN
COMMUNITY

## 2024-07-25 RX ORDER — BENZOCAINE/MENTHOL 6 MG-10 MG
1 LOZENGE MUCOUS MEMBRANE 2 TIMES DAILY
COMMUNITY

## 2024-07-25 RX ORDER — LACTULOSE 10 G/15ML
20 SOLUTION ORAL 2 TIMES DAILY PRN
COMMUNITY

## 2024-07-25 NOTE — PROGRESS NOTES
"Chief Complaint  Follow-up (Patient denies any pain, denies pain or swelling to feet )    Subjective          Demarcus Pastor presents to [facility] for follow up.    History of Present Illness  Mr. Pastor was brought by a CNA from his nursing home.  She is not familiar with this case.  They bring a med list that is not consistent with our med list here.  He reportedly is no longer taking a beta-blocker or MRA.  He is on 80 mg of atorvastatin daily per their records.  I attempted to call the nurse to learn more about the medication changes and why certain medications were changed/stopped.    He was a patient of Dr. Hill with his last visit being 05/13/2024.  His last note revealed a med list that included dual antiplatelet therapy, atorvastatin 20 mg, carvedilol 3.125 twice daily in addition to the Entresto     Due to his dementia, he is a very poor historian.  He does shake his head no when I ask if he is having any chest pain or if he feels short of breath.  He smiles broadly when I ask if he needs any help with anything, but he is unable to verbalize any needs.  Tobacco Use: Low Risk  (7/25/2024)    Patient History     Smoking Tobacco Use: Never     Smokeless Tobacco Use: Never     Passive Exposure: Not on file       Objective     Vital Signs:   /81 (BP Location: Left arm, Patient Position: Sitting, Cuff Size: Adult)   Pulse 58   Ht 177.8 cm (70\")   Wt 55.3 kg (122 lb)   SpO2 99%   BMI 17.51 kg/m²       Physical Exam  Vitals and nursing note reviewed.   Constitutional:       General: He is not in acute distress.     Appearance: He is underweight. He is ill-appearing.   HENT:      Head: Normocephalic and atraumatic.   Eyes:      Conjunctiva/sclera: Conjunctivae normal.   Neck:      Vascular: No carotid bruit.   Cardiovascular:      Rate and Rhythm: Normal rate and regular rhythm.      Pulses: Normal pulses.   Pulmonary:      Effort: Pulmonary effort is normal.      " Breath sounds: Normal breath sounds.   Musculoskeletal:      Cervical back: Neck supple.      Right lower leg: No edema.      Left lower leg: No edema.      Comments: In a wheelchair   Skin:     General: Skin is warm and dry.   Neurological:      General: No focal deficit present.   Psychiatric:         Mood and Affect: Mood normal.         Behavior: Behavior normal.          Result Review :   The following data was reviewed by: THAO Shook on 07/25/2024:  Common labs          1/27/2024    07:32   Common Labs   Glucose 96    BUN 19    Creatinine 1.16    Sodium 142    Potassium 4.0    Chloride 110    Calcium 8.0    Albumin 3.2    Total Bilirubin 0.3    Alkaline Phosphatase 58    AST (SGOT) 27    ALT (SGPT) 18    WBC 7.34    Hemoglobin 12.4    Hematocrit 39.7    Platelets 250        Data reviewed : Cardiology studies as detailed below      Last Cardiac Cath      Last Stress test       Last Echo  Results for orders placed during the hospital encounter of 09/11/20    Adult Transthoracic Echo Complete W/ Cont if Necessary Per Protocol    Interpretation Summary  · Ejection fraction appears to be 36 - 40%. Left ventricular systolic function is moderately decreased. Left ventricular diastolic function is consistent with (grade I) impaired relaxation and age.  · The following left ventricular wall segments are hypokinetic: apical anterior, apical septal, apex hypokinetic and mid anteroseptal.  · There is a trivial pericardial effusion  · No significant changes to previous study             Current Outpatient Medications   Medication Sig Dispense Refill    acetaminophen (TYLENOL) 500 MG tablet Take 1 tablet by mouth Every 4 (Four) Hours As Needed for Mild Pain.      aspirin 81 MG EC tablet Take 1 tablet by mouth Daily.      atorvastatin (LIPITOR) 40 MG tablet Take 1 tablet by mouth Daily. (Patient taking differently: Take 2 tablets by mouth Daily.) 30 tablet 0    clopidogrel (PLAVIX) 75 MG tablet Take 1 tablet by  mouth Daily.      cyanocobalamin 1000 MCG/ML injection Inject 1 mL into the appropriate muscle as directed by prescriber Every 30 (Thirty) Days.      finasteride (PROSCAR) 5 MG tablet Take 1 tablet by mouth Daily.      HYDROcodone-acetaminophen (NORCO) 5-325 MG per tablet Take 1 tablet by mouth Every 8 (Eight) Hours As Needed for Moderate Pain . 10 tablet 0    hydrocortisone 1 % cream Apply 1 Application topically to the appropriate area as directed 2 (Two) Times a Day.      lactulose (CHRONULAC) 10 GM/15ML solution Take 30 mL by mouth 2 (Two) Times a Day As Needed.      levothyroxine (SYNTHROID, LEVOTHROID) 100 MCG tablet Take 1 tablet by mouth Daily.      ondansetron (ZOFRAN) 4 MG tablet Take 1 tablet by mouth Every 8 (Eight) Hours As Needed for Nausea or Vomiting.      pantoprazole (PROTONIX) 40 MG EC tablet Take 1 tablet by mouth Every Night.      sacubitril-valsartan (ENTRESTO)  MG tablet Take 1 tablet by mouth 2 (Two) Times a Day.      carvedilol (COREG) 6.25 MG tablet Take 0.5 tablets by mouth 2 (Two) Times a Day With Meals for 30 days. (Patient taking differently: Take 0.5 tablets by mouth 2 (Two) Times a Day With Meals. 3.125 BID) 30 tablet 0    spironolactone (ALDACTONE) 25 MG tablet Take 12.5 mg by mouth Daily. (Patient not taking: Reported on 4/25/2024)      tamsulosin (FLOMAX) 0.4 MG capsule 24 hr capsule Take 1 capsule by mouth Daily. (Patient not taking: Reported on 7/25/2024)      thiamine (VITAMIN B-1) 100 MG tablet Take 100 mg by mouth Daily. (Patient not taking: Reported on 4/25/2024)      vitamin D (ERGOCALCIFEROL) 1.25 MG (01925 UT) capsule capsule Take 50,000 Units by mouth 1 (One) Time Per Week. (Patient not taking: Reported on 4/25/2024)       No current facility-administered medications for this visit.            Assessment and Plan    Problem List Items Addressed This Visit       Hypertension (Chronic)    Chronic HFrEF (heart failure with reduced ejection fraction) - Primary (Chronic)      Diagnoses and all orders for this visit:    1. Chronic HFrEF (heart failure with reduced ejection fraction) (Primary)    2. Primary hypertension      As I was unable to reach anyone at the nursing home to discuss his medications, will not make any changes without learning more about why medications were discontinued.  At his age, I would decrease statin therapy to 20 mg of atorvastatin maximum to avoid any myalgias that would make him more inactive.  He seems to be stable on Entresto alone without other heart failure medications.  Will truly work towards decreasing his polypharmacy.      Follow Up     Return in about 3 months (around 10/25/2024).    Patient was given instructions and counseling regarding his condition or for health maintenance advice. Please see specific information pulled into the AVS if appropriate.         Electronically signed by THAO Shook, 07/28/24, 8:09 PM EDT.    Dictated Utilizing Dragon Dictation: Part of this note may be an electronic transcription/translation of spoken language to printed text using the Dragon Dictation System

## 2024-07-28 PROBLEM — I50.22 CHRONIC HFREF (HEART FAILURE WITH REDUCED EJECTION FRACTION): Status: ACTIVE | Noted: 2024-07-28

## 2024-07-28 PROBLEM — I50.22 CHRONIC HFREF (HEART FAILURE WITH REDUCED EJECTION FRACTION): Chronic | Status: ACTIVE | Noted: 2024-07-28

## 2024-10-18 ENCOUNTER — APPOINTMENT (OUTPATIENT)
Dept: CT IMAGING | Facility: HOSPITAL | Age: 89
DRG: 871 | End: 2024-10-18
Payer: MEDICARE

## 2024-10-18 ENCOUNTER — HOSPITAL ENCOUNTER (INPATIENT)
Facility: HOSPITAL | Age: 89
LOS: 2 days | Discharge: NURSING, REHAB OR LONG-TERM CARE | DRG: 871 | End: 2024-10-20
Attending: STUDENT IN AN ORGANIZED HEALTH CARE EDUCATION/TRAINING PROGRAM | Admitting: STUDENT IN AN ORGANIZED HEALTH CARE EDUCATION/TRAINING PROGRAM
Payer: MEDICARE

## 2024-10-18 ENCOUNTER — APPOINTMENT (OUTPATIENT)
Dept: GENERAL RADIOLOGY | Facility: HOSPITAL | Age: 89
DRG: 871 | End: 2024-10-18
Payer: MEDICARE

## 2024-10-18 DIAGNOSIS — E87.20 LACTIC ACID ACIDOSIS: ICD-10-CM

## 2024-10-18 DIAGNOSIS — R56.9 SEIZURE-LIKE ACTIVITY: ICD-10-CM

## 2024-10-18 DIAGNOSIS — R50.9 FEVER, UNSPECIFIED FEVER CAUSE: Primary | ICD-10-CM

## 2024-10-18 LAB
ALBUMIN SERPL-MCNC: 3.4 G/DL (ref 3.5–5.2)
ALBUMIN/GLOB SERPL: 1 G/DL
ALP SERPL-CCNC: 76 U/L (ref 39–117)
ALT SERPL W P-5'-P-CCNC: 9 U/L (ref 1–41)
ANION GAP SERPL CALCULATED.3IONS-SCNC: 21.7 MMOL/L (ref 5–15)
AST SERPL-CCNC: 15 U/L (ref 1–40)
ATMOSPHERIC PRESS: 738 MMHG
B PARAPERT DNA SPEC QL NAA+PROBE: NOT DETECTED
B PERT DNA SPEC QL NAA+PROBE: NOT DETECTED
BASE EXCESS BLDV CALC-SCNC: -3.1 MMOL/L (ref 0–2)
BASOPHILS # BLD AUTO: 0.09 10*3/MM3 (ref 0–0.2)
BASOPHILS NFR BLD AUTO: 0.8 % (ref 0–1.5)
BDY SITE: ABNORMAL
BILIRUB SERPL-MCNC: 0.9 MG/DL (ref 0–1.2)
BILIRUB UR QL STRIP: NEGATIVE
BUN SERPL-MCNC: 14 MG/DL (ref 8–23)
BUN/CREAT SERPL: 10.3 (ref 7–25)
C PNEUM DNA NPH QL NAA+NON-PROBE: NOT DETECTED
CALCIUM SPEC-SCNC: 8.8 MG/DL (ref 8.2–9.6)
CHLORIDE SERPL-SCNC: 101 MMOL/L (ref 98–107)
CK SERPL-CCNC: 74 U/L (ref 20–200)
CLARITY UR: CLEAR
CO2 BLDA-SCNC: 23.4 MMOL/L (ref 22–33)
CO2 SERPL-SCNC: 12.3 MMOL/L (ref 22–29)
COHGB MFR BLD: 1 % (ref 0–5)
COLOR UR: YELLOW
CREAT SERPL-MCNC: 1.36 MG/DL (ref 0.76–1.27)
CRP SERPL-MCNC: <0.3 MG/DL (ref 0–0.5)
D-LACTATE SERPL-SCNC: 1.3 MMOL/L (ref 0.5–2)
D-LACTATE SERPL-SCNC: 2.3 MMOL/L (ref 0.5–2)
D-LACTATE SERPL-SCNC: 9.6 MMOL/L (ref 0.5–2)
DEPRECATED RDW RBC AUTO: 63.8 FL (ref 37–54)
EGFRCR SERPLBLD CKD-EPI 2021: 49.1 ML/MIN/1.73
EOSINOPHIL # BLD AUTO: 0.04 10*3/MM3 (ref 0–0.4)
EOSINOPHIL NFR BLD AUTO: 0.4 % (ref 0.3–6.2)
ERYTHROCYTE [DISTWIDTH] IN BLOOD BY AUTOMATED COUNT: 19.9 % (ref 12.3–15.4)
FLUAV RNA RESP QL NAA+PROBE: NOT DETECTED
FLUAV SUBTYP SPEC NAA+PROBE: NOT DETECTED
FLUBV RNA ISLT QL NAA+PROBE: NOT DETECTED
FLUBV RNA RESP QL NAA+PROBE: NOT DETECTED
GLOBULIN UR ELPH-MCNC: 3.3 GM/DL
GLUCOSE SERPL-MCNC: 121 MG/DL (ref 65–99)
GLUCOSE UR STRIP-MCNC: NEGATIVE MG/DL
HADV DNA SPEC NAA+PROBE: NOT DETECTED
HCO3 BLDV-SCNC: 22.2 MMOL/L (ref 22–28)
HCOV 229E RNA SPEC QL NAA+PROBE: NOT DETECTED
HCOV HKU1 RNA SPEC QL NAA+PROBE: NOT DETECTED
HCOV NL63 RNA SPEC QL NAA+PROBE: NOT DETECTED
HCOV OC43 RNA SPEC QL NAA+PROBE: NOT DETECTED
HCT VFR BLD AUTO: 42.8 % (ref 37.5–51)
HGB BLD-MCNC: 13.1 G/DL (ref 13–17.7)
HGB BLDA-MCNC: 13 G/DL (ref 14–18)
HGB UR QL STRIP.AUTO: NEGATIVE
HMPV RNA NPH QL NAA+NON-PROBE: NOT DETECTED
HOLD SPECIMEN: NORMAL
HOLD SPECIMEN: NORMAL
HPIV1 RNA ISLT QL NAA+PROBE: NOT DETECTED
HPIV2 RNA SPEC QL NAA+PROBE: NOT DETECTED
HPIV3 RNA NPH QL NAA+PROBE: NOT DETECTED
HPIV4 P GENE NPH QL NAA+PROBE: NOT DETECTED
IMM GRANULOCYTES # BLD AUTO: 0.09 10*3/MM3 (ref 0–0.05)
IMM GRANULOCYTES NFR BLD AUTO: 0.8 % (ref 0–0.5)
INHALED O2 CONCENTRATION: 21 %
KETONES UR QL STRIP: NEGATIVE
LEUKOCYTE ESTERASE UR QL STRIP.AUTO: NEGATIVE
LYMPHOCYTES # BLD AUTO: 1.44 10*3/MM3 (ref 0.7–3.1)
LYMPHOCYTES NFR BLD AUTO: 13.3 % (ref 19.6–45.3)
Lab: ABNORMAL
M PNEUMO IGG SER IA-ACNC: NOT DETECTED
MCH RBC QN AUTO: 26.9 PG (ref 26.6–33)
MCHC RBC AUTO-ENTMCNC: 30.6 G/DL (ref 31.5–35.7)
MCV RBC AUTO: 87.9 FL (ref 79–97)
METHGB BLD QL: 0.4 % (ref 0–3)
MODALITY: ABNORMAL
MONOCYTES # BLD AUTO: 0.65 10*3/MM3 (ref 0.1–0.9)
MONOCYTES NFR BLD AUTO: 6 % (ref 5–12)
NEUTROPHILS NFR BLD AUTO: 78.7 % (ref 42.7–76)
NEUTROPHILS NFR BLD AUTO: 8.5 10*3/MM3 (ref 1.7–7)
NITRITE UR QL STRIP: NEGATIVE
NRBC BLD AUTO-RTO: 0 /100 WBC (ref 0–0.2)
OXYHGB MFR BLDV: 38.9 % (ref 45–75)
PCO2 BLDV: 39.4 MM HG (ref 41–51)
PH BLDV: 7.36 PH UNITS (ref 7.32–7.42)
PH UR STRIP.AUTO: 7 [PH] (ref 5–8)
PLATELET # BLD AUTO: 372 10*3/MM3 (ref 140–450)
PMV BLD AUTO: 9.9 FL (ref 6–12)
PO2 BLDV: 27.6 MM HG (ref 27–53)
POTASSIUM SERPL-SCNC: 4 MMOL/L (ref 3.5–5.2)
PROCALCITONIN SERPL-MCNC: 0.06 NG/ML (ref 0–0.25)
PROT SERPL-MCNC: 6.7 G/DL (ref 6–8.5)
PROT UR QL STRIP: NEGATIVE
QT INTERVAL: 352 MS
QTC INTERVAL: 421 MS
RBC # BLD AUTO: 4.87 10*6/MM3 (ref 4.14–5.8)
RHINOVIRUS RNA SPEC NAA+PROBE: NOT DETECTED
RSV RNA NPH QL NAA+NON-PROBE: NOT DETECTED
SAO2 % BLDCOV: 39.5 % (ref 45–75)
SARS-COV-2 RNA NPH QL NAA+NON-PROBE: NOT DETECTED
SARS-COV-2 RNA RESP QL NAA+PROBE: NOT DETECTED
SODIUM SERPL-SCNC: 135 MMOL/L (ref 136–145)
SP GR UR STRIP: 1.01 (ref 1–1.03)
UROBILINOGEN UR QL STRIP: NORMAL
VENTILATOR MODE: ABNORMAL
WBC NRBC COR # BLD AUTO: 10.81 10*3/MM3 (ref 3.4–10.8)
WHOLE BLOOD HOLD COAG: NORMAL
WHOLE BLOOD HOLD SPECIMEN: NORMAL

## 2024-10-18 PROCEDURE — 82550 ASSAY OF CK (CPK): CPT | Performed by: STUDENT IN AN ORGANIZED HEALTH CARE EDUCATION/TRAINING PROGRAM

## 2024-10-18 PROCEDURE — 86140 C-REACTIVE PROTEIN: CPT | Performed by: STUDENT IN AN ORGANIZED HEALTH CARE EDUCATION/TRAINING PROGRAM

## 2024-10-18 PROCEDURE — 70450 CT HEAD/BRAIN W/O DYE: CPT

## 2024-10-18 PROCEDURE — 25510000001 IOPAMIDOL 61 % SOLUTION: Performed by: STUDENT IN AN ORGANIZED HEALTH CARE EDUCATION/TRAINING PROGRAM

## 2024-10-18 PROCEDURE — 93010 ELECTROCARDIOGRAM REPORT: CPT | Performed by: SPECIALIST

## 2024-10-18 PROCEDURE — 0202U NFCT DS 22 TRGT SARS-COV-2: CPT | Performed by: STUDENT IN AN ORGANIZED HEALTH CARE EDUCATION/TRAINING PROGRAM

## 2024-10-18 PROCEDURE — 74177 CT ABD & PELVIS W/CONTRAST: CPT | Performed by: RADIOLOGY

## 2024-10-18 PROCEDURE — 85025 COMPLETE CBC W/AUTO DIFF WBC: CPT | Performed by: STUDENT IN AN ORGANIZED HEALTH CARE EDUCATION/TRAINING PROGRAM

## 2024-10-18 PROCEDURE — 70450 CT HEAD/BRAIN W/O DYE: CPT | Performed by: RADIOLOGY

## 2024-10-18 PROCEDURE — 25810000003 SODIUM CHLORIDE 0.9 % SOLUTION 250 ML FLEX CONT: Performed by: STUDENT IN AN ORGANIZED HEALTH CARE EDUCATION/TRAINING PROGRAM

## 2024-10-18 PROCEDURE — 25010000002 ENOXAPARIN PER 10 MG: Performed by: STUDENT IN AN ORGANIZED HEALTH CARE EDUCATION/TRAINING PROGRAM

## 2024-10-18 PROCEDURE — 99285 EMERGENCY DEPT VISIT HI MDM: CPT

## 2024-10-18 PROCEDURE — 93005 ELECTROCARDIOGRAM TRACING: CPT | Performed by: STUDENT IN AN ORGANIZED HEALTH CARE EDUCATION/TRAINING PROGRAM

## 2024-10-18 PROCEDURE — 87636 SARSCOV2 & INF A&B AMP PRB: CPT | Performed by: STUDENT IN AN ORGANIZED HEALTH CARE EDUCATION/TRAINING PROGRAM

## 2024-10-18 PROCEDURE — 82805 BLOOD GASES W/O2 SATURATION: CPT

## 2024-10-18 PROCEDURE — 36415 COLL VENOUS BLD VENIPUNCTURE: CPT

## 2024-10-18 PROCEDURE — 87040 BLOOD CULTURE FOR BACTERIA: CPT | Performed by: STUDENT IN AN ORGANIZED HEALTH CARE EDUCATION/TRAINING PROGRAM

## 2024-10-18 PROCEDURE — P9612 CATHETERIZE FOR URINE SPEC: HCPCS

## 2024-10-18 PROCEDURE — 82820 HEMOGLOBIN-OXYGEN AFFINITY: CPT

## 2024-10-18 PROCEDURE — 25010000002 LEVETRIRACETAM PER 10 MG: Performed by: STUDENT IN AN ORGANIZED HEALTH CARE EDUCATION/TRAINING PROGRAM

## 2024-10-18 PROCEDURE — 25010000002 CEFTRIAXONE PER 250 MG: Performed by: STUDENT IN AN ORGANIZED HEALTH CARE EDUCATION/TRAINING PROGRAM

## 2024-10-18 PROCEDURE — 25810000003 SODIUM CHLORIDE 0.9 % SOLUTION: Performed by: STUDENT IN AN ORGANIZED HEALTH CARE EDUCATION/TRAINING PROGRAM

## 2024-10-18 PROCEDURE — 74177 CT ABD & PELVIS W/CONTRAST: CPT

## 2024-10-18 PROCEDURE — 25010000002 AZITHROMYCIN PER 500 MG: Performed by: STUDENT IN AN ORGANIZED HEALTH CARE EDUCATION/TRAINING PROGRAM

## 2024-10-18 PROCEDURE — 71260 CT THORAX DX C+: CPT

## 2024-10-18 PROCEDURE — 81003 URINALYSIS AUTO W/O SCOPE: CPT | Performed by: STUDENT IN AN ORGANIZED HEALTH CARE EDUCATION/TRAINING PROGRAM

## 2024-10-18 PROCEDURE — 84145 PROCALCITONIN (PCT): CPT | Performed by: STUDENT IN AN ORGANIZED HEALTH CARE EDUCATION/TRAINING PROGRAM

## 2024-10-18 PROCEDURE — 99223 1ST HOSP IP/OBS HIGH 75: CPT | Performed by: STUDENT IN AN ORGANIZED HEALTH CARE EDUCATION/TRAINING PROGRAM

## 2024-10-18 PROCEDURE — 71260 CT THORAX DX C+: CPT | Performed by: RADIOLOGY

## 2024-10-18 PROCEDURE — 71045 X-RAY EXAM CHEST 1 VIEW: CPT

## 2024-10-18 PROCEDURE — 80053 COMPREHEN METABOLIC PANEL: CPT | Performed by: STUDENT IN AN ORGANIZED HEALTH CARE EDUCATION/TRAINING PROGRAM

## 2024-10-18 PROCEDURE — 71045 X-RAY EXAM CHEST 1 VIEW: CPT | Performed by: RADIOLOGY

## 2024-10-18 PROCEDURE — 83605 ASSAY OF LACTIC ACID: CPT | Performed by: STUDENT IN AN ORGANIZED HEALTH CARE EDUCATION/TRAINING PROGRAM

## 2024-10-18 PROCEDURE — 25010000002 METHYLPREDNISOLONE PER 40 MG: Performed by: STUDENT IN AN ORGANIZED HEALTH CARE EDUCATION/TRAINING PROGRAM

## 2024-10-18 RX ORDER — TRIAMCINOLONE ACETONIDE 1 MG/G
1 CREAM TOPICAL DAILY
Status: CANCELLED | OUTPATIENT
Start: 2024-10-19

## 2024-10-18 RX ORDER — BISACODYL 5 MG/1
5 TABLET, DELAYED RELEASE ORAL DAILY PRN
Status: DISCONTINUED | OUTPATIENT
Start: 2024-10-18 | End: 2024-10-20 | Stop reason: HOSPADM

## 2024-10-18 RX ORDER — ACETAMINOPHEN 325 MG/1
650 TABLET ORAL EVERY 6 HOURS PRN
Status: DISCONTINUED | OUTPATIENT
Start: 2024-10-18 | End: 2024-10-20 | Stop reason: HOSPADM

## 2024-10-18 RX ORDER — LEVETIRACETAM 500 MG/5ML
750 INJECTION, SOLUTION, CONCENTRATE INTRAVENOUS EVERY 12 HOURS SCHEDULED
Status: DISCONTINUED | OUTPATIENT
Start: 2024-10-18 | End: 2024-10-19

## 2024-10-18 RX ORDER — IOPAMIDOL 612 MG/ML
100 INJECTION, SOLUTION INTRAVASCULAR
Status: DISCONTINUED | OUTPATIENT
Start: 2024-10-18 | End: 2024-10-20 | Stop reason: HOSPADM

## 2024-10-18 RX ORDER — GUAIFENESIN 600 MG/1
1200 TABLET, EXTENDED RELEASE ORAL EVERY 12 HOURS SCHEDULED
Status: DISCONTINUED | OUTPATIENT
Start: 2024-10-18 | End: 2024-10-19

## 2024-10-18 RX ORDER — IOPAMIDOL 612 MG/ML
100 INJECTION, SOLUTION INTRAVASCULAR
Status: COMPLETED | OUTPATIENT
Start: 2024-10-18 | End: 2024-10-18

## 2024-10-18 RX ORDER — GLY/DIMETH/PETROLAT,WHT/WATER
1 CREAM (GRAM) TOPICAL DAILY
COMMUNITY

## 2024-10-18 RX ORDER — FLUTICASONE FUROATE, UMECLIDINIUM BROMIDE AND VILANTEROL TRIFENATATE 200; 62.5; 25 UG/1; UG/1; UG/1
1 POWDER RESPIRATORY (INHALATION)
COMMUNITY

## 2024-10-18 RX ORDER — SODIUM CHLORIDE 0.9 % (FLUSH) 0.9 %
10 SYRINGE (ML) INJECTION AS NEEDED
Status: DISCONTINUED | OUTPATIENT
Start: 2024-10-18 | End: 2024-10-20 | Stop reason: HOSPADM

## 2024-10-18 RX ORDER — ACETAMINOPHEN 500 MG
500 TABLET ORAL EVERY 6 HOURS PRN
Status: CANCELLED | OUTPATIENT
Start: 2024-10-18

## 2024-10-18 RX ORDER — LEVOTHYROXINE SODIUM 88 UG/1
88 TABLET ORAL DAILY
COMMUNITY

## 2024-10-18 RX ORDER — ONDANSETRON 2 MG/ML
4 INJECTION INTRAMUSCULAR; INTRAVENOUS EVERY 6 HOURS PRN
Status: DISCONTINUED | OUTPATIENT
Start: 2024-10-18 | End: 2024-10-20 | Stop reason: HOSPADM

## 2024-10-18 RX ORDER — SPIRONOLACTONE 25 MG/1
25 TABLET ORAL DAILY
COMMUNITY
End: 2024-10-20 | Stop reason: HOSPADM

## 2024-10-18 RX ORDER — METHYLPREDNISOLONE SODIUM SUCCINATE 40 MG/ML
40 INJECTION, POWDER, LYOPHILIZED, FOR SOLUTION INTRAMUSCULAR; INTRAVENOUS EVERY 12 HOURS
Status: DISCONTINUED | OUTPATIENT
Start: 2024-10-18 | End: 2024-10-19

## 2024-10-18 RX ORDER — IPRATROPIUM BROMIDE AND ALBUTEROL SULFATE 2.5; .5 MG/3ML; MG/3ML
3 SOLUTION RESPIRATORY (INHALATION)
Status: DISCONTINUED | OUTPATIENT
Start: 2024-10-18 | End: 2024-10-20 | Stop reason: HOSPADM

## 2024-10-18 RX ORDER — EMOLLIENT COMBINATION NO.92
LOTION (ML) TOPICAL DAILY
Status: CANCELLED | OUTPATIENT
Start: 2024-10-19

## 2024-10-18 RX ORDER — TRIAMCINOLONE ACETONIDE 1 MG/G
1 CREAM TOPICAL DAILY
COMMUNITY

## 2024-10-18 RX ORDER — ENOXAPARIN SODIUM 100 MG/ML
30 INJECTION SUBCUTANEOUS NIGHTLY
Status: DISCONTINUED | OUTPATIENT
Start: 2024-10-18 | End: 2024-10-20 | Stop reason: HOSPADM

## 2024-10-18 RX ORDER — ATORVASTATIN CALCIUM 80 MG/1
80 TABLET, FILM COATED ORAL EVERY EVENING
COMMUNITY

## 2024-10-18 RX ORDER — AMOXICILLIN 250 MG
2 CAPSULE ORAL 2 TIMES DAILY PRN
Status: DISCONTINUED | OUTPATIENT
Start: 2024-10-18 | End: 2024-10-20 | Stop reason: HOSPADM

## 2024-10-18 RX ORDER — ACETAMINOPHEN 650 MG/1
650 SUPPOSITORY RECTAL ONCE
Status: COMPLETED | OUTPATIENT
Start: 2024-10-18 | End: 2024-10-18

## 2024-10-18 RX ORDER — NITROGLYCERIN 0.4 MG/1
0.4 TABLET SUBLINGUAL
Status: DISCONTINUED | OUTPATIENT
Start: 2024-10-18 | End: 2024-10-20 | Stop reason: HOSPADM

## 2024-10-18 RX ORDER — POLYETHYLENE GLYCOL 3350 17 G/17G
17 POWDER, FOR SOLUTION ORAL DAILY PRN
Status: DISCONTINUED | OUTPATIENT
Start: 2024-10-18 | End: 2024-10-20 | Stop reason: HOSPADM

## 2024-10-18 RX ORDER — ASPIRIN 81 MG/1
81 TABLET, CHEWABLE ORAL DAILY
COMMUNITY

## 2024-10-18 RX ORDER — BISACODYL 10 MG
10 SUPPOSITORY, RECTAL RECTAL DAILY PRN
Status: DISCONTINUED | OUTPATIENT
Start: 2024-10-18 | End: 2024-10-20 | Stop reason: HOSPADM

## 2024-10-18 RX ORDER — SODIUM CHLORIDE 9 MG/ML
40 INJECTION, SOLUTION INTRAVENOUS AS NEEDED
Status: DISCONTINUED | OUTPATIENT
Start: 2024-10-18 | End: 2024-10-20 | Stop reason: HOSPADM

## 2024-10-18 RX ORDER — SODIUM CHLORIDE 0.9 % (FLUSH) 0.9 %
10 SYRINGE (ML) INJECTION EVERY 12 HOURS SCHEDULED
Status: DISCONTINUED | OUTPATIENT
Start: 2024-10-18 | End: 2024-10-20 | Stop reason: HOSPADM

## 2024-10-18 RX ADMIN — ACETAMINOPHEN 650 MG: 650 SUPPOSITORY RECTAL at 11:35

## 2024-10-18 RX ADMIN — SODIUM CHLORIDE 1000 ML: 9 INJECTION, SOLUTION INTRAVENOUS at 11:36

## 2024-10-18 RX ADMIN — ENOXAPARIN SODIUM 30 MG: 30 INJECTION SUBCUTANEOUS at 22:00

## 2024-10-18 RX ADMIN — CEFTRIAXONE 1000 MG: 1 INJECTION, POWDER, FOR SOLUTION INTRAMUSCULAR; INTRAVENOUS at 11:38

## 2024-10-18 RX ADMIN — IOPAMIDOL 70 ML: 612 INJECTION, SOLUTION INTRAVENOUS at 14:17

## 2024-10-18 RX ADMIN — SODIUM CHLORIDE 1000 ML: 9 INJECTION, SOLUTION INTRAVENOUS at 12:53

## 2024-10-18 RX ADMIN — Medication 10 ML: at 22:01

## 2024-10-18 RX ADMIN — DOXYCYCLINE 100 MG: 100 INJECTION, POWDER, LYOPHILIZED, FOR SOLUTION INTRAVENOUS at 22:39

## 2024-10-18 RX ADMIN — GUAIFENESIN 1200 MG: 600 TABLET ORAL at 22:00

## 2024-10-18 RX ADMIN — LEVETIRACETAM 750 MG: 500 INJECTION, SOLUTION, CONCENTRATE INTRAVENOUS at 22:39

## 2024-10-18 RX ADMIN — AZITHROMYCIN MONOHYDRATE 500 MG: 500 INJECTION, POWDER, LYOPHILIZED, FOR SOLUTION INTRAVENOUS at 17:06

## 2024-10-18 RX ADMIN — METHYLPREDNISOLONE SODIUM SUCCINATE 40 MG: 40 INJECTION, POWDER, FOR SOLUTION INTRAMUSCULAR; INTRAVENOUS at 21:59

## 2024-10-18 NOTE — ED PROVIDER NOTES
EMERGENCY DEPARTMENT ENCOUNTER    Room Number:  115/15  Date seen:  10/18/2024  Time seen: 11:04 EDT  PCP: Umm Butt MD        HPI:    89-year-old male with history of hypertension, status post CVA with speech deficits, anemia, diverticulosis, Alzheimer's, hypertension, anxiety, neuromuscular dysfunction, hemiplegia, dysphagia, GERD presents to the ER from nursing home with possible seizure-like activity.  Patient described as generalized shaking episode this morning which concerned the nursing facility and patient was thus sent to the emergency department for further evaluation.  No reported trauma, no history of seizures in the past.  History is limited as patient cannot provide history.    PAST MEDICAL HISTORY  Active Ambulatory Problems     Diagnosis Date Noted    Esophageal stricture 06/15/2016    Hypertension 06/15/2016    Chronic back pain 06/15/2016    Weight loss 06/15/2016    Dementia 06/15/2016    Elevated LFTs 06/16/2016    Dysphagia 06/16/2016    Colitis 06/19/2016    Abnormal CT of the abdomen 06/19/2016    Malnutrition 06/23/2016    Iron deficiency anemia 11/08/2016    Anemia due to vitamin B12 deficiency 11/08/2016    Hypokalemia 11/08/2016    Late onset 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 11/08/2016    Anemia 11/10/2016    Closed fracture of neck of right femur 09/11/2020    Chronic HFrEF (heart failure with reduced ejection fraction) 07/28/2024     Resolved Ambulatory Problems     Diagnosis Date Noted    Thrombocytosis 06/23/2016    Urinary retention 11/07/2016    Abdominal pain 11/10/2019     Past Medical History:   Diagnosis Date    Alzheimer's disease with late onset     Anxiety     Atelectasis     Atherosclerotic heart disease     Cardiomyopathy     CVA (cerebral vascular accident)     Diverticulosis     GERD (gastroesophageal reflux disease)     Hemiplegia     History of transfusion     Hyperlipidemia     Inguinal hernia     Intracardiac thrombosis, not elsewhere  classified     Kidney stone     Muscle weakness     Neuromuscular dysfunction of bladder     Pancreatitis          PAST SURGICAL HISTORY  Past Surgical History:   Procedure Laterality Date    COLONOSCOPY N/A 11/9/2016    Procedure: COLONOSCOPY CPT CODE: 08495;  Surgeon: Lilliam Wilson DO;  Location:  [facility];  Service:     ENDOSCOPY N/A 6/17/2016    Procedure: ESOPHAGOGASTRODUODENOSCOPY;  Surgeon: Alex Robles III, MD;  Location:  [facility];  Service:     ENDOSCOPY N/A 11/9/2016    Procedure: ESOPHAGOGASTRODUODENOSCOPY WITH BIOPSY CPT CODE: 39891;  Surgeon: Lilliam Wilson DO;  Location:  [facility];  Service:     ESOPHAGEAL DILATATION      HIP BIPOLAR REPLACEMENT Right 9/12/2020    Procedure: HIP BIPOLAR REPLACEMENT;  Surgeon: Gustavo Araiza MD;  Location:  [facility];  Service: Orthopedics;  Laterality: Right;         FAMILY HISTORY  Family History   Problem Relation Age of Onset    Stroke Mother     Heart disease Brother          SOCIAL HISTORY  Social History     Socioeconomic History    Marital status:    Tobacco Use    Smoking status: Never    Smokeless tobacco: Never   Vaping Use    Vaping status: Never Used   Substance and Sexual Activity    Alcohol use: No    Drug use: No    Sexual activity: Defer         ALLERGIES  Penicillins        REVIEW OF SYSTEMS    All systems reviewed and negative except for those discussed in HPI.       PHYSICAL EXAM  ED Triage Vitals   Temp Heart Rate Resp BP SpO2   10/18/24 1058 10/18/24 1054 10/18/24 1054 10/18/24 1054 10/18/24 1054   (!) 100.9 °F (38.3 °C) 101 (!) 36 108/69 96 %      Temp src Heart Rate Source Patient Position BP Location FiO2 (%)   10/18/24 1058 10/18/24 1054 10/18/24 1054 10/18/24 1054 --   Rectal Monitor Lying Right arm        Vital signs and nursing notes reviewed.  GENERAL: Elderly male, chronically ill-appearing, generalized shaking, but does appear conscious while having these generalized shaking episodes.  Cachectic  CV: Normal  perfusion.  Slight tachycardia.  RESPIRATORY: No respiratory distress.  No wheezing, rales or rhonchi  ABDOMEN: Soft.  Nontender.  Scaphoid abdomen  Neuro: Limited movement to extremities.        LAB RESULTS  Recent Results (from the past 24 hours)   Comprehensive Metabolic Panel    Collection Time: 10/18/24 11:35 AM    Specimen: Arm, Right; Blood   Result Value Ref Range    Glucose 121 (H) 65 - 99 mg/dL    BUN 14 8 - 23 mg/dL    Creatinine 1.36 (H) 0.76 - 1.27 mg/dL    Sodium 135 (L) 136 - 145 mmol/L    Potassium 4.0 3.5 - 5.2 mmol/L    Chloride 101 98 - 107 mmol/L    CO2 12.3 (L) 22.0 - 29.0 mmol/L    Calcium 8.8 8.2 - 9.6 mg/dL    Total Protein 6.7 6.0 - 8.5 g/dL    Albumin 3.4 (L) 3.5 - 5.2 g/dL    ALT (SGPT) 9 1 - 41 U/L    AST (SGOT) 15 1 - 40 U/L    Alkaline Phosphatase 76 39 - 117 U/L    Total Bilirubin 0.9 0.0 - 1.2 mg/dL    Globulin 3.3 gm/dL    A/G Ratio 1.0 g/dL    BUN/Creatinine Ratio 10.3 7.0 - 25.0    Anion Gap 21.7 (H) 5.0 - 15.0 mmol/L    eGFR 49.1 (L) >60.0 mL/min/1.73   Lactic Acid, Plasma    Collection Time: 10/18/24 11:35 AM    Specimen: Arm, Right; Blood   Result Value Ref Range    Lactate 9.6 (C) 0.5 - 2.0 mmol/L   Green Top (Gel)    Collection Time: 10/18/24 11:35 AM   Result Value Ref Range    Extra Tube Hold for add-ons.    Lavender Top    Collection Time: 10/18/24 11:35 AM   Result Value Ref Range    Extra Tube hold for add-on    Gold Top - SST    Collection Time: 10/18/24 11:35 AM   Result Value Ref Range    Extra Tube Hold for add-ons.    Light Blue Top    Collection Time: 10/18/24 11:35 AM   Result Value Ref Range    Extra Tube Hold for add-ons.    CBC Auto Differential    Collection Time: 10/18/24 11:35 AM    Specimen: Arm, Right; Blood   Result Value Ref Range    WBC 10.81 (H) 3.40 - 10.80 10*3/mm3    RBC 4.87 4.14 - 5.80 10*6/mm3    Hemoglobin 13.1 13.0 - 17.7 g/dL    Hematocrit 42.8 37.5 - 51.0 %    MCV 87.9 79.0 - 97.0 fL    MCH 26.9 26.6 - 33.0 pg    MCHC 30.6 (L) 31.5 - 35.7 g/dL     RDW 19.9 (H) 12.3 - 15.4 %    RDW-SD 63.8 (H) 37.0 - 54.0 fl    MPV 9.9 6.0 - 12.0 fL    Platelets 372 140 - 450 10*3/mm3    Neutrophil % 78.7 (H) 42.7 - 76.0 %    Lymphocyte % 13.3 (L) 19.6 - 45.3 %    Monocyte % 6.0 5.0 - 12.0 %    Eosinophil % 0.4 0.3 - 6.2 %    Basophil % 0.8 0.0 - 1.5 %    Immature Grans % 0.8 (H) 0.0 - 0.5 %    Neutrophils, Absolute 8.50 (H) 1.70 - 7.00 10*3/mm3    Lymphocytes, Absolute 1.44 0.70 - 3.10 10*3/mm3    Monocytes, Absolute 0.65 0.10 - 0.90 10*3/mm3    Eosinophils, Absolute 0.04 0.00 - 0.40 10*3/mm3    Basophils, Absolute 0.09 0.00 - 0.20 10*3/mm3    Immature Grans, Absolute 0.09 (H) 0.00 - 0.05 10*3/mm3    nRBC 0.0 0.0 - 0.2 /100 WBC   CK    Collection Time: 10/18/24 11:35 AM    Specimen: Arm, Right; Blood   Result Value Ref Range    Creatine Kinase 74 20 - 200 U/L   C-reactive Protein    Collection Time: 10/18/24 11:35 AM    Specimen: Arm, Right; Blood   Result Value Ref Range    C-Reactive Protein <0.30 0.00 - 0.50 mg/dL   Procalcitonin    Collection Time: 10/18/24 11:35 AM    Specimen: Arm, Right; Blood   Result Value Ref Range    Procalcitonin 0.06 0.00 - 0.25 ng/mL   Urinalysis With Culture If Indicated - Straight Cath    Collection Time: 10/18/24 11:37 AM    Specimen: Straight Cath; Urine   Result Value Ref Range    Color, UA Yellow Yellow, Straw    Appearance, UA Clear Clear    pH, UA 7.0 5.0 - 8.0    Specific Gravity, UA 1.014 1.005 - 1.030    Glucose, UA Negative Negative    Ketones, UA Negative Negative    Bilirubin, UA Negative Negative    Blood, UA Negative Negative    Protein, UA Negative Negative    Leuk Esterase, UA Negative Negative    Nitrite, UA Negative Negative    Urobilinogen, UA 1.0 E.U./dL 0.2 - 1.0 E.U./dL   ECG 12 Lead Other; Sepsis    Collection Time: 10/18/24 11:48 AM   Result Value Ref Range    QT Interval 352 ms    QTC Interval 421 ms   COVID-19 and FLU A/B PCR, 1 HR TAT - Swab, Nasopharynx    Collection Time: 10/18/24 11:49 AM    Specimen:  Nasopharynx; Swab   Result Value Ref Range    COVID19 Not Detected Not Detected - Ref. Range    Influenza A PCR Not Detected Not Detected    Influenza B PCR Not Detected Not Detected   Blood Gas, Venous With Co-Ox    Collection Time: 10/18/24  1:35 PM    Specimen: Venous Blood   Result Value Ref Range    Site Lab     pH, Venous 7.359 7.320 - 7.420 pH Units    pCO2, Venous 39.4 (L) 41.0 - 51.0 mm Hg    pO2, Venous 27.6 27.0 - 53.0 mm Hg    HCO3, Venous 22.2 22.0 - 28.0 mmol/L    Base Excess, Venous -3.1 (L) 0.0 - 2.0 mmol/L    O2 Saturation, Venous 39.5 (L) 45.0 - 75.0 %    Hemoglobin, Blood Gas 13.0 (L) 14 - 18 g/dL    CO2 Content 23.4 22 - 33 mmol/L    Barometric Pressure for Blood Gas 738 mmHg    Modality Room Air     FIO2 21 %    Ventilator Mode NA     Collected by [id number]     Oxyhemoglobin Venous 38.9 (L) 45.0 - 75.0 %    Carboxyhemoglobin Venous 1.0 0.0 - 5.0 %    Methemoglobin Venous 0.4 0.0 - 3.0 %   STAT Lactic Acid, Reflex    Collection Time: 10/18/24  3:04 PM    Specimen: Arm, Right; Blood   Result Value Ref Range    Lactate 2.3 (C) 0.5 - 2.0 mmol/L       Ordered the above labs and reviewed the results.        RADIOLOGY  CT Abdomen Pelvis With Contrast    Result Date: 10/18/2024  EXAM:   CT Abdomen and Pelvis With Intravenous Contrast  EXAM DATE:   10/18/2024 1:43 PM  CLINICAL HISTORY:   sepsis  TECHNIQUE:   Axial computed tomography images of the abdomen and pelvis with intravenous contrast.  Sagittal and coronal reformatted images were created and reviewed.  This CT exam was performed using one or more of the following dose reduction techniques:  automated exposure control, adjustment of the mA and/or kV according to patient size, and/or use of iterative reconstruction technique.  COMPARISON:   January 27, 2024  FINDINGS:   LUNG BASES:  Unremarkable as visualized.  No mass.  No consolidation.   MEDIASTINUM:  Moderate-sized hiatal hernia.   ABDOMEN:   LIVER:  Unremarkable as visualized.  No mass.    GALLBLADDER AND BILE DUCTS:  Cholecystectomy clips.  Pneumobilia noted.  No ductal dilation.   PANCREAS:  Unremarkable as visualized.  No mass.  No ductal dilation.   SPLEEN:  Unremarkable as visualized.  No splenomegaly.   ADRENALS:  Unremarkable as visualized.  No mass.   KIDNEYS AND URETERS:  Unremarkable as visualized.  No solid mass.  No hydronephrosis.   STOMACH AND BOWEL:  Left inguinal hernia containing nondilated sigmoid colon again.  Rectal vault abundant stool.  No mucosal thickening.   PELVIS:   APPENDIX:  No findings to suggest acute appendicitis.   BLADDER:  Unremarkable as visualized.  No mass.   REPRODUCTIVE:  Unremarkable as visualized.   ABDOMEN and PELVIS:   INTRAPERITONEAL SPACE:  Unremarkable as visualized.  No free air.  No significant fluid collection.   BONES/JOINTS:  Degenerative disc disease throughout the lumbar spine. Degenerative facet arthropathy throughout the lumbar spine, most prominent in the lower lumbar spine.  No acute fracture.  No dislocation.   SOFT TISSUES:  See above.   VASCULATURE:  Unremarkable as visualized.  No abdominal aortic aneurysm.   LYMPH NODES:  Unremarkable as visualized.  No enlarged lymph nodes.      1.  Moderate-sized hiatal hernia. 2.  Left inguinal hernia containing nondilated sigmoid colon again. 3.  Rectal vault abundant stool. 4.  Pneumobilia noted. 5.  Degenerative changes lumbar spine as described.   This report was finalized on 10/18/2024 2:52 PM by Dr. Anthony Pastor MD.      CT Chest With Contrast Diagnostic    Result Date: 10/18/2024  EXAM:   CT Chest With Intravenous Contrast  EXAM DATE:   10/18/2024 1:43 PM  CLINICAL HISTORY:   sepsis  TECHNIQUE:   Axial computed tomography images of the chest with intravenous contrast.  Sagittal and coronal reformatted images were created and reviewed.  This CT exam was performed using one or more of the following dose reduction techniques:  automated exposure control, adjustment of the mA and/or kV according to  patient size, and/or use of iterative reconstruction technique.  COMPARISON:   No relevant prior studies available.  FINDINGS:   LIMITATIONS:  Please note that reported measurments are made manually, as computer generated (AI) measurements can over measure lesions. Additionally, lesions identified by AI may have be present presently, significant nodules will be discussed in the report and the visual depictions of lesions provided by AI are for general referency only.   LUNGS AND PLEURAL SPACES:  Minimal right upper lobe inflammatory airspace disease.  No consolidation.  No significant effusion.   HEART:  Unremarkable as visualized.  No cardiomegaly.  No significant pericardial effusion.  No significant coronary artery calcifications.   MEDIASTINUM:  Small to moderate-sized hiatal hernia.   BONES/JOINTS:  Unremarkable as visualized.  No acute fracture.  No dislocation.   SOFT TISSUES:  Unremarkable as visualized.   VASCULATURE:  Unremarkable as visualized.  No thoracic aortic aneurysm.   LYMPH NODES:  Unremarkable as visualized.  No enlarged lymph nodes.      1.  Minimal right upper lobe inflammatory airspace disease. 2.  Small to moderate-sized hiatal hernia.   This report was finalized on 10/18/2024 2:50 PM by Dr. Anthony Pastor MD.      CT Head Without Contrast    Result Date: 10/18/2024  EXAM:   CT Head Without Intravenous Contrast  EXAM DATE:   10/18/2024 12:20 PM  CLINICAL HISTORY:   possible seizure like activity  TECHNIQUE:   Axial computed tomography images of the head/brain without intravenous contrast.  Sagittal and coronal reformatted images were created and reviewed.  This CT exam was performed using one or more of the following dose reduction techniques:  automated exposure control, adjustment of the mA and/or kV according to patient size, and/or use of iterative reconstruction technique.  COMPARISON:   No relevant prior studies available.  FINDINGS:   BRAIN AND EXTRA-AXIAL SPACES:  Left posterior  partial encephalomalacia again noted.  No hemorrhage.  No significant white matter disease.   BONES/JOINTS:  Unremarkable as visualized.  No acute fracture.   SOFT TISSUES:  Unremarkable as visualized.   SINUSES:  Unremarkable as visualized.  No acute sinusitis.   MASTOID AIR CELLS:  Unremarkable as visualized.  No mastoid effusion.        Left posterior partial encephalomalacia again noted.   This report was finalized on 10/18/2024 12:38 PM by Dr. Anthony Pastor MD.      XR Chest 1 View    Result Date: 10/18/2024  EXAM:   XR Chest, 1 View  EXAM DATE:   10/18/2024 11:24 AM  CLINICAL HISTORY:   Simple Sepsis Protocol  TECHNIQUE:   Frontal view of the chest.  COMPARISON:   January 27, 2024  FINDINGS:   LUNGS AND PLEURAL SPACES:  Atelectasis in the lung bases.  No pneumothorax.   HEART:  Unremarkable as visualized.  No cardiomegaly.   MEDIASTINUM:  Unremarkable as visualized.  Normal mediastinal contour.   BONES/JOINTS:  Unremarkable as visualized.  No acute fracture.   VASCULATURE:  Atherosclerotic disease.   UPPER ABDOMEN:  Interposing air of the right hemidiaphragm again noted.        No acute cardiopulmonary findings identified.   This report was finalized on 10/18/2024 11:45 AM by Dr. Anthony Pastor MD.       I ordered the above noted radiological studies. Reviewed by me and discussed with radiologist.  See dictation for official radiology interpretation.        PROCEDURES  Procedures      MEDICATIONS GIVEN IN ER  Medications   sodium chloride 0.9 % flush 10 mL (has no administration in time range)   iopamidol (ISOVUE-300) 61 % injection 100 mL ( Intravenous Canceled Entry 10/18/24 1422)   sodium chloride 0.9 % bolus 1,000 mL (0 mL Intravenous Stopped 10/18/24 1256)   acetaminophen (TYLENOL) suppository 650 mg (650 mg Rectal Given 10/18/24 1135)   cefTRIAXone (ROCEPHIN) 1,000 mg in sodium chloride 0.9 % 100 mL IVPB-VTB (0 mg Intravenous Stopped 10/18/24 1256)   sodium chloride 0.9 % bolus 1,000 mL (0 mL Intravenous  Stopped 10/18/24 1353)   iopamidol (ISOVUE-300) 61 % injection 100 mL (70 mL Intravenous Given 10/18/24 1417)   azithromycin (ZITHROMAX) 500 mg in sodium chloride 0.9 % 250 mL IVPB-VTB (0 mg Intravenous Stopped 10/18/24 1809)         MEDICATIONS GIVEN IN ER    Medications   sodium chloride 0.9 % flush 10 mL (has no administration in time range)   iopamidol (ISOVUE-300) 61 % injection 100 mL ( Intravenous Canceled Entry 10/18/24 1422)   sodium chloride 0.9 % bolus 1,000 mL (0 mL Intravenous Stopped 10/18/24 1256)   acetaminophen (TYLENOL) suppository 650 mg (650 mg Rectal Given 10/18/24 1135)   cefTRIAXone (ROCEPHIN) 1,000 mg in sodium chloride 0.9 % 100 mL IVPB-VTB (0 mg Intravenous Stopped 10/18/24 1256)   sodium chloride 0.9 % bolus 1,000 mL (0 mL Intravenous Stopped 10/18/24 1353)   iopamidol (ISOVUE-300) 61 % injection 100 mL (70 mL Intravenous Given 10/18/24 1417)   azithromycin (ZITHROMAX) 500 mg in sodium chloride 0.9 % 250 mL IVPB-VTB (0 mg Intravenous Stopped 10/18/24 1809)         PROGRESS, DATA ANALYSIS, CONSULTS, AND MEDICAL DECISION MAKING    All labs have been independently reviewed by me.  All radiology studies have been reviewed by me and discussed with radiologist dictating the report.   EKG's independently viewed and interpreted by me.  Discussion below represents my analysis of pertinent findings related to patient's condition, differential diagnosis, treatment plan and final disposition.           AS OF 18:28 EDT VITALS:    BP - 126/99  HR - 57  TEMP - 99.1 °F (37.3 °C) (Rectal)  02 SATS - 98%      MDM    Patient evaluated for altered mental status, possible seizure-like activity at the nursing home.  Patient found to have a temperature of 100.9 rectally, vital signs otherwise stable.  Exam shows demented elderly male.    Differential: Seizure, brain mass, electrolyte abnormality, anemia, substance abuse, UTI, viral illness    EKG performed on 10/18/2024 at 1148 shows normal sinus rhythm, rate of  86, intervals normal.  No STEMI.  Electronically signed by Willie Caban DO, 10/18/24, 12:14 PM EDT.    CT brain negative for acute pathology, left posterior parietal encephalomalacia noted which is chronic.    CBC shows no significant leukocytosis or anemia, CMP shows no gross electrolyte abnormality, but CO2 noted to be 12.3, patient has anion gap of 21.  Initial lactic acid 9.6.    Patient given 1 g ceftriaxone as well as 500 mg azithromycin.    Repeat lactic acid 2.3.  This is a significant drop compared to 9.6.  Patient does not have history of seizures, but it is possible patient did indeed have a seizure due to the significant rise of lactic acid.    Patient did receive 30 cc/kg fluid resuscitation.    Urinalysis negative for urinary tract infection.    Patient had otherwise an unremarkable ED course.    Discussed case with on-call hospitalist Dr. Soria who agrees with management can agrees to meet the patient.  He will decide on consideration of broadening antibiotics and further investigation for elevated lactic acid.        Sepsis Note    Based on the patient's presentation, I believe the patient has sepsis, as they have SIRS and a suspected infection.    For Septic Shock, the patient is septic with a lactate >=4 or is hypotensive despite fluid resuscitation.    Severe Sepsis Time Zero: 10/18/24  LA: 9.6  Blood cultures and initial lactate were obtained. Broad-spectrum IV antibiotics were administered.  Ceftriaxone    Crystalloid Administered:  A 30 cc/kg IV fluid bolus was given.   2L NS.    Repeat Lactate Obtained: 2.3  Repeat Sepsis Exam Time: 10/18/24 at 1330.  I have reassessed the patient’s hemodynamic status and tissue perfusion after the fluid bolus was given.      DIAGNOSIS  Final diagnoses:   Fever, unspecified fever cause   Seizure-like activity   Lactic acid acidosis         DISPOSITION  ED Disposition       ED Disposition   Decision to Admit    Condition   --    Comment   Level of Care:  Telemetry [5]   Diagnosis: Seizure [[id number]]   Certification: I Certify That Inpatient Hospital Services Are Medically Necessary For Greater Than 2 Midnights                                  Willie Caban DO  10/18/24 1827

## 2024-10-18 NOTE — H&P
"    [facility]    Patient Identification:  Name:  Demarcus Pastor  Age:  89 y.o.  Sex:  male  :  1933  MRN:  [id number]   Admit Date: 10/18/2024   Visit Number:  [id number]  Room number:  115/15  Primary Care Physician:  Umm Butt MD     Subjective     Chief complaint:    Chief Complaint   Patient presents with    Tremors       History of presenting illness:   Patient is a 89-year-old male with history significant for prior ischemic stroke with residual speech deficits/hemiplegia, hypertension, advanced dementia who presented from the nursing home with possible seizure-like activity.  Given advanced dementia, patient is a poor historian and history obtained via discussion with ER staff.    Patient was described as generalized shaking earlier this morning and upper and lower extremities.  Nursing facility was concerned this was seizure therefore he was sent to the ER for further evaluation.  In the ER, he was noted to have \"shaking \"but seemed to be alert with his eyes open throughout this process consistent with his prior tremors without evidence of seizure disorder.  It was difficult to determine if he was postictal given his baseline dementia.    Workup in the ER noted a CT brain which noted left posterior parietal encephalomalacia from prior stroke, initial lactic acid 9.6 but repeat 2.3.  I discussed with his family regarding potential lumbar puncture who opted to forego it, noted patient was a DNR/DNI.    ---------------------------------------------------------------------------------------------------------------------   Review of Systems   Unable to perform ROS: Dementia     ---------------------------------------------------------------------------------------------------------------------   Past Medical History:   Diagnosis Date    Abnormal CT of the abdomen 2016    Alzheimer's disease with late onset     Anxiety     Atelectasis     " Atherosclerotic heart disease     Cardiomyopathy     Colitis 6/19/2016    CVA (cerebral vascular accident)     Diverticulosis     Dysphagia     GERD (gastroesophageal reflux disease)     Hemiplegia     History of transfusion     Hyperlipidemia     Hypertension     Hypertension     Hypokalemia     Inguinal hernia     Intracardiac thrombosis, not elsewhere classified     Iron deficiency anemia     Kidney stone     Malnutrition     Muscle weakness     Neuromuscular dysfunction of bladder     Pancreatitis      Past Surgical History:   Procedure Laterality Date    COLONOSCOPY N/A 11/9/2016    Procedure: COLONOSCOPY CPT CODE: 76166;  Surgeon: Lilliam Wilson DO;  Location: [facility] OR;  Service:     ENDOSCOPY N/A 6/17/2016    Procedure: ESOPHAGOGASTRODUODENOSCOPY;  Surgeon: Alex Robles III, MD;  Location:  [facility] OR;  Service:     ENDOSCOPY N/A 11/9/2016    Procedure: ESOPHAGOGASTRODUODENOSCOPY WITH BIOPSY CPT CODE: 21681;  Surgeon: Lilliam Wislon DO;  Location: [facility] OR;  Service:     ESOPHAGEAL DILATATION      HIP BIPOLAR REPLACEMENT Right 9/12/2020    Procedure: HIP BIPOLAR REPLACEMENT;  Surgeon: Gustavo Araiza MD;  Location: [facility] OR;  Service: Orthopedics;  Laterality: Right;     Family History   Problem Relation Age of Onset    Stroke Mother     Heart disease Brother      Social History     Socioeconomic History    Marital status:    Tobacco Use    Smoking status: Never    Smokeless tobacco: Never   Vaping Use    Vaping status: Never Used   Substance and Sexual Activity    Alcohol use: No    Drug use: No    Sexual activity: Defer     ---------------------------------------------------------------------------------------------------------------------   Allergies:  Penicillins  ---------------------------------------------------------------------------------------------------------------------   Medications below are reported home medications pulling from within the system; at this time,  these medications have not been reconciled unless otherwise specified and are in the verification process for further verifcation as current home medications.    Prior to Admission Medications       Prescriptions Last Dose Informant Patient Reported? Taking?    acetaminophen (TYLENOL) 500 MG tablet  Nursing Home Yes No    Take 1 tablet by mouth Every 4 (Four) Hours As Needed for Mild Pain.    aspirin 81 MG EC tablet  Nursing Home Yes No    Take 1 tablet by mouth Daily.    atorvastatin (LIPITOR) 40 MG tablet   No No    Take 1 tablet by mouth Daily.    Patient taking differently:  Take 2 tablets by mouth Daily.    carvedilol (COREG) 6.25 MG tablet   No No    Take 0.5 tablets by mouth 2 (Two) Times a Day With Meals for 30 days.    Patient taking differently:  Take 0.5 tablets by mouth 2 (Two) Times a Day With Meals. 3.125 BID    clopidogrel (PLAVIX) 75 MG tablet  Nursing Home Yes No    Take 1 tablet by mouth Daily.    cyanocobalamin 1000 MCG/ML injection  Nursing Home Yes No    Inject 1 mL into the appropriate muscle as directed by prescriber Every 30 (Thirty) Days.    finasteride (PROSCAR) 5 MG tablet  Nursing Home Yes No    Take 1 tablet by mouth Daily.    HYDROcodone-acetaminophen (NORCO) 5-325 MG per tablet   No No    Take 1 tablet by mouth Every 8 (Eight) Hours As Needed for Moderate Pain .    hydrocortisone 1 % cream   Yes No    Apply 1 Application topically to the appropriate area as directed 2 (Two) Times a Day.    lactulose (CHRONULAC) 10 GM/15ML solution   Yes No    Take 30 mL by mouth 2 (Two) Times a Day As Needed.    levothyroxine (SYNTHROID, LEVOTHROID) 100 MCG tablet   Yes No    Take 1 tablet by mouth Daily.    ondansetron (ZOFRAN) 4 MG tablet  Nursing Home Yes No    Take 1 tablet by mouth Every 8 (Eight) Hours As Needed for Nausea or Vomiting.    pantoprazole (PROTONIX) 40 MG EC tablet  Nursing Home Yes No    Take 1 tablet by mouth Every Night.    sacubitril-valsartan (ENTRESTO)  MG tablet  Nursing  Home Yes No    Take 1 tablet by mouth 2 (Two) Times a Day.    spironolactone (ALDACTONE) 25 MG tablet  Nursing Home Yes No    Take 12.5 mg by mouth Daily.    Patient not taking:  Reported on 4/25/2024    tamsulosin (FLOMAX) 0.4 MG capsule 24 hr capsule  Nursing Home Yes No    Take 1 capsule by mouth Daily.    Patient not taking:  Reported on 7/25/2024    thiamine (VITAMIN B-1) 100 MG tablet  Nursing Home Yes No    Take 100 mg by mouth Daily.    Patient not taking:  Reported on 4/25/2024    vitamin D (ERGOCALCIFEROL) 1.25 MG (56305 UT) capsule capsule  Nursing Home Yes No    Take 50,000 Units by mouth 1 (One) Time Per Week.    Patient not taking:  Reported on 4/25/2024          Objective     Vital Signs:  Temp:  [99.1 °F (37.3 °C)-100.9 °F (38.3 °C)] 99.1 °F (37.3 °C)  Heart Rate:  [] 56  Resp:  [24-36] 24  BP: (105-121)/(53-71) 118/62    Mean Arterial Pressure (Non-Invasive) for the past 24 hrs (Last 3 readings):   Noninvasive MAP (mmHg)   10/18/24 1700 93   10/18/24 1630 87   10/18/24 1600 84     SpO2:  [93 %-100 %] 97 %  on   ;   Device (Oxygen Therapy): room air  Body mass index is 17.22 kg/m².    Wt Readings from Last 3 Encounters:   10/18/24 54.4 kg (120 lb)   07/25/24 55.3 kg (122 lb)   04/25/24 55.6 kg (122 lb 9.6 oz)      ---------------------------------------------------------------------------------------------------------------------   Physical Exam:  Constitutional: Elderly male, chronically ill-appearing, tremulous, NAD.      HENT:  Head: Normocephalic and atraumatic.  Mouth:  Moist mucous membranes.    Eyes:  Conjunctivae and EOM are normal.  No scleral icterus.  Neck:  Neck supple.  No JVD present.    Cardiovascular:  Normal rate, regular rhythm and normal heart sounds with no murmur.  Pulmonary/Chest:  No respiratory distress, no wheezes, no crackles, with normal breath sounds and good air movement.  Abdominal:  Soft.  Bowel sounds are normal.  No distension and no tenderness.    Musculoskeletal:  No tenderness, and no deformity.  No red or swollen joints anywhere.    Neurological: Alert, at baseline  Skin:  Skin is warm and dry.  No rash noted.  No pallor.   Peripheral vascular:  No edema and pulses on all 4 extremities.    ---------------------------------------------------------------------------------------------------------------------  EKG: Normal sinus rhythm, rate 86, QTc 421, no acute ST or T wave changes    ECG 12 Lead Other; Sepsis   Final Result   Test Reason : Other~   Blood Pressure :   */*   mmHG   Vent. Rate :  86 BPM     Atrial Rate :  86 BPM      P-R Int : 152 ms          QRS Dur :  88 ms       QT Int : 352 ms       P-R-T Axes :  35 -49  54 degrees      QTc Int : 421 ms      Normal sinus rhythm   Left axis deviation   Inferior infarct , age undetermined   Anteroseptal infarct (cited on or before 10-NOV-2019)   Abnormal ECG   When compared with ECG of 21-SEP-2020 23:08,   Significant changes have occurred   Confirmed by Ladarius Bell ([id number]) on 10/18/2024 2:12:12 PM      Referred By: JER           Confirmed By: Ladarius Bell          Telemetry: Reviewed    I have personally looked at both the EKG and the telemetry strips.    Last echocardiogram:  Results for orders placed during the hospital encounter of 09/11/20    Adult Transthoracic Echo Complete W/ Cont if Necessary Per Protocol    Interpretation Summary  · Ejection fraction appears to be 36 - 40%. Left ventricular systolic function is moderately decreased. Left ventricular diastolic function is consistent with (grade I) impaired relaxation and age.  · The following left ventricular wall segments are hypokinetic: apical anterior, apical septal, apex hypokinetic and mid anteroseptal.  · There is a trivial pericardial effusion  · No significant changes to previous study    --------------------------------------------------------------------------------------------------------------------  Labs:  Results from last 7 days  "  Lab Units 10/18/24  1504 10/18/24  1135   LACTATE mmol/L 2.3* 9.6*   WBC 10*3/mm3  --  10.81*   HEMOGLOBIN g/dL  --  13.1   HEMATOCRIT %  --  42.8   MCV fL  --  87.9   MCHC g/dL  --  30.6*   PLATELETS 10*3/mm3  --  372         Results from last 7 days   Lab Units 10/18/24  1135   SODIUM mmol/L 135*   POTASSIUM mmol/L 4.0   CHLORIDE mmol/L 101   CO2 mmol/L 12.3*   BUN mg/dL 14   CREATININE mg/dL 1.36*   CALCIUM mg/dL 8.8   GLUCOSE mg/dL 121*   ALBUMIN g/dL 3.4*   BILIRUBIN mg/dL 0.9   ALK PHOS U/L 76   AST (SGOT) U/L 15   ALT (SGPT) U/L 9   Estimated Creatinine Clearance: 27.2 mL/min (A) (by C-G formula based on SCr of 1.36 mg/dL (H)).    No results found for: \"AMMONIA\"          No results found for: \"HGBA1C\", \"POCGLU\"  Lab Results   Component Value Date    TSH 5.230 (H) 11/10/2019    FREET4 0.98 11/11/2019     No results found for: \"PREGTESTUR\", \"PREGSERUM\", \"HCG\", \"HCGQUANT\"  Pain Management Panel          Latest Ref Rng & Units 1/13/2014   Pain Management Panel   Amphetamine, Urine Qual NEGATIVE Negative    Barbiturates Screen, Urine NEGATIVE Negative    Benzodiazepine Screen, Urine NEGATIVE Negative    Cocaine Screen, Urine NEGATIVE Negative    Methadone Screen , Urine NEGATIVE Negative       Details                 Brief Urine Lab Results  (Last result in the past 365 days)        Color   Clarity   Blood   Leuk Est   Nitrite   Protein   CREAT   Urine HCG        10/18/24 1137 Yellow   Clear   Negative   Negative   Negative   Negative                 No results found for: \"BLOODCX\"  No results found for: \"URINECX\"  No results found for: \"WOUNDCX\"  No results found for: \"STOOLCX\"    I have personally looked at the labs and they are summarized above.  ----------------------------------------------------------------------------------------------------------------------  Detailed radiology reports for the last 24 hours:    Imaging Results (Last 24 Hours)       Procedure Component Value Units Date/Time    CT Abdomen " Pelvis With Contrast [[id number]] Collected: 10/18/24 1451     Updated: 10/18/24 1454    Narrative:      EXAM:    CT Abdomen and Pelvis With Intravenous Contrast     EXAM DATE:    10/18/2024 1:43 PM     CLINICAL HISTORY:    sepsis     TECHNIQUE:    Axial computed tomography images of the abdomen and pelvis with  intravenous contrast.  Sagittal and coronal reformatted images were  created and reviewed.  This CT exam was performed using one or more of  the following dose reduction techniques:  automated exposure control,  adjustment of the mA and/or kV according to patient size, and/or use of  iterative reconstruction technique.     COMPARISON:    January 27, 2024     FINDINGS:    LUNG BASES:  Unremarkable as visualized.  No mass.  No consolidation.    MEDIASTINUM:  Moderate-sized hiatal hernia.      ABDOMEN:    LIVER:  Unremarkable as visualized.  No mass.    GALLBLADDER AND BILE DUCTS:  Cholecystectomy clips.  Pneumobilia  noted.  No ductal dilation.    PANCREAS:  Unremarkable as visualized.  No mass.  No ductal dilation.    SPLEEN:  Unremarkable as visualized.  No splenomegaly.    ADRENALS:  Unremarkable as visualized.  No mass.    KIDNEYS AND URETERS:  Unremarkable as visualized.  No solid mass.  No  hydronephrosis.    STOMACH AND BOWEL:  Left inguinal hernia containing nondilated sigmoid  colon again.  Rectal vault abundant stool.  No mucosal thickening.      PELVIS:    APPENDIX:  No findings to suggest acute appendicitis.    BLADDER:  Unremarkable as visualized.  No mass.    REPRODUCTIVE:  Unremarkable as visualized.      ABDOMEN and PELVIS:    INTRAPERITONEAL SPACE:  Unremarkable as visualized.  No free air.  No  significant fluid collection.    BONES/JOINTS:  Degenerative disc disease throughout the lumbar spine.   Degenerative facet arthropathy throughout the lumbar spine, most  prominent in the lower lumbar spine.  No acute fracture.  No  dislocation.    SOFT TISSUES:  See above.    VASCULATURE:  Unremarkable  as visualized.  No abdominal aortic  aneurysm.    LYMPH NODES:  Unremarkable as visualized.  No enlarged lymph nodes.       Impression:      1.  Moderate-sized hiatal hernia.  2.  Left inguinal hernia containing nondilated sigmoid colon again.  3.  Rectal vault abundant stool.  4.  Pneumobilia noted.  5.  Degenerative changes lumbar spine as described.        This report was finalized on 10/18/2024 2:52 PM by Dr. Anthony Pastor MD.       CT Chest With Contrast Diagnostic [[id number]] Collected: 10/18/24 1449     Updated: 10/18/24 1452    Narrative:      EXAM:    CT Chest With Intravenous Contrast     EXAM DATE:    10/18/2024 1:43 PM     CLINICAL HISTORY:    sepsis     TECHNIQUE:    Axial computed tomography images of the chest with intravenous  contrast.  Sagittal and coronal reformatted images were created and  reviewed.  This CT exam was performed using one or more of the following  dose reduction techniques:  automated exposure control, adjustment of  the mA and/or kV according to patient size, and/or use of iterative  reconstruction technique.     COMPARISON:    No relevant prior studies available.     FINDINGS:    LIMITATIONS:  Please note that reported measurments are made manually,  as computer generated (AI) measurements can over measure lesions.  Additionally, lesions identified by AI may have be present presently,  significant nodules will be discussed in the report and the visual  depictions of lesions provided by AI are for general referency only.    LUNGS AND PLEURAL SPACES:  Minimal right upper lobe inflammatory  airspace disease.  No consolidation.  No significant effusion.    HEART:  Unremarkable as visualized.  No cardiomegaly.  No significant  pericardial effusion.  No significant coronary artery calcifications.    MEDIASTINUM:  Small to moderate-sized hiatal hernia.    BONES/JOINTS:  Unremarkable as visualized.  No acute fracture.  No  dislocation.    SOFT TISSUES:  Unremarkable as visualized.     VASCULATURE:  Unremarkable as visualized.  No thoracic aortic  aneurysm.    LYMPH NODES:  Unremarkable as visualized.  No enlarged lymph nodes.       Impression:      1.  Minimal right upper lobe inflammatory airspace disease.  2.  Small to moderate-sized hiatal hernia.        This report was finalized on 10/18/2024 2:50 PM by Dr. Anthony Pastor MD.       CT Head Without Contrast [[id number]] Collected: 10/18/24 1236     Updated: 10/18/24 1240    Narrative:      EXAM:    CT Head Without Intravenous Contrast     EXAM DATE:    10/18/2024 12:20 PM     CLINICAL HISTORY:    possible seizure like activity     TECHNIQUE:    Axial computed tomography images of the head/brain without intravenous  contrast.  Sagittal and coronal reformatted images were created and  reviewed.  This CT exam was performed using one or more of the following  dose reduction techniques:  automated exposure control, adjustment of  the mA and/or kV according to patient size, and/or use of iterative  reconstruction technique.     COMPARISON:    No relevant prior studies available.     FINDINGS:    BRAIN AND EXTRA-AXIAL SPACES:  Left posterior partial encephalomalacia  again noted.  No hemorrhage.  No significant white matter disease.    BONES/JOINTS:  Unremarkable as visualized.  No acute fracture.    SOFT TISSUES:  Unremarkable as visualized.    SINUSES:  Unremarkable as visualized.  No acute sinusitis.    MASTOID AIR CELLS:  Unremarkable as visualized.  No mastoid effusion.       Impression:        Left posterior partial encephalomalacia again noted.        This report was finalized on 10/18/2024 12:38 PM by Dr. Anthony Pastor MD.       XR Chest 1 View [[id number]] Collected: 10/18/24 1145     Updated: 10/18/24 1147    Narrative:      EXAM:    XR Chest, 1 View     EXAM DATE:    10/18/2024 11:24 AM     CLINICAL HISTORY:    Simple Sepsis Protocol     TECHNIQUE:    Frontal view of the chest.     COMPARISON:    January 27, 2024     FINDINGS:    LUNGS AND  PLEURAL SPACES:  Atelectasis in the lung bases.  No  pneumothorax.    HEART:  Unremarkable as visualized.  No cardiomegaly.    MEDIASTINUM:  Unremarkable as visualized.  Normal mediastinal contour.    BONES/JOINTS:  Unremarkable as visualized.  No acute fracture.    VASCULATURE:  Atherosclerotic disease.    UPPER ABDOMEN:  Interposing air of the right hemidiaphragm again  noted.       Impression:        No acute cardiopulmonary findings identified.        This report was finalized on 10/18/2024 11:45 AM by Dr. Anthony Pastor MD.             Final impressions for the last 30 days of radiology reports:    CT Abdomen Pelvis With Contrast    Result Date: 10/18/2024  1.  Moderate-sized hiatal hernia. 2.  Left inguinal hernia containing nondilated sigmoid colon again. 3.  Rectal vault abundant stool. 4.  Pneumobilia noted. 5.  Degenerative changes lumbar spine as described.   This report was finalized on 10/18/2024 2:52 PM by Dr. Anthony Pastor MD.      CT Chest With Contrast Diagnostic    Result Date: 10/18/2024  1.  Minimal right upper lobe inflammatory airspace disease. 2.  Small to moderate-sized hiatal hernia.   This report was finalized on 10/18/2024 2:50 PM by Dr. Anthony Pastor MD.      CT Head Without Contrast    Result Date: 10/18/2024    Left posterior partial encephalomalacia again noted.   This report was finalized on 10/18/2024 12:38 PM by Dr. Anthony Pastor MD.      XR Chest 1 View    Result Date: 10/18/2024    No acute cardiopulmonary findings identified.   This report was finalized on 10/18/2024 11:45 AM by Dr. Anthony Pastor MD.     I have personally looked at the radiology images and read the final radiology report.    Assessment & Plan      Patient is a 89-year-old male with history significant for prior ischemic stroke with residual speech deficits/hemiplegia, hypertension, advanced dementia who presented from the nursing home with possible seizure-like activity.     #Seizure-like activity  #Chronic  encephalomalacia from prior stroke  #Sepsis due to community-acquired pneumonia, bacterial, unspecified  #Prior ischemic stroke with residual hemiplegia/speech deficits  #Advanced dementia without behavioral disturbances  #Essential hypertension  #Chronic HFrEF not in acute exacerbation  #Lactic acidosis, likely due to the above  #CKD stage IIIb  #Elevated anion gap metabolic acidosis due to lactic acidosis    --Patient presented w/ seizure-like activity from the nursing home, difficult to tell if he was postictal due to baseline dementia.  Some credibility to potential seizure given significant lactic acidosis which clinically improved without much intervention  --Admission labs showed initial lactic 9.6, repeat 2.3, Tmax 100.9  --Blood cultures x 2, urinalysis unremarkable, respiratory panel negative  --CT head noted encephalomalacia  --CT chest right upper lobe pneumonia  --CT abdomen pelvis hiatal hernia, left inguinal hernia, nonincarcerated  --Start steroids, DuoNebs  --Start Rocephin/Doxy  --Will empirically start Keppra 750 mg twice daily for now  --I discussed with his son via telephone, he opted to forego lumbar puncture or any other aggressive interventions due to goals of care.  Noted patient was a DNR/DNI.  --Admit to telemetry    Checklist:  Antibiotics: Rocephin/Doxy  Steroids: Solu-Medrol  DVT ppx: lovenox  GI ppx: ppi  Diet: Modified  Code: No CPR, limited  Risk/dispo: Patient is a high risk due to possible new onset seizure disorder, fever and pneumonia.  Anticipate greater than 2 midnight stay.  Disposition expected Home when medically stable.    Shaji Soria DO  [facility] Hospitalist  10/18/24  18:02 EDT

## 2024-10-19 LAB
ANION GAP SERPL CALCULATED.3IONS-SCNC: 11.1 MMOL/L (ref 5–15)
BASOPHILS # BLD AUTO: 0.06 10*3/MM3 (ref 0–0.2)
BASOPHILS NFR BLD AUTO: 0.5 % (ref 0–1.5)
BUN SERPL-MCNC: 14 MG/DL (ref 8–23)
BUN/CREAT SERPL: 12.8 (ref 7–25)
CALCIUM SPEC-SCNC: 8.4 MG/DL (ref 8.2–9.6)
CHLORIDE SERPL-SCNC: 108 MMOL/L (ref 98–107)
CO2 SERPL-SCNC: 16.9 MMOL/L (ref 22–29)
CREAT SERPL-MCNC: 1.09 MG/DL (ref 0.76–1.27)
DEPRECATED RDW RBC AUTO: 62.6 FL (ref 37–54)
EGFRCR SERPLBLD CKD-EPI 2021: 64.1 ML/MIN/1.73
EOSINOPHIL # BLD AUTO: 0.04 10*3/MM3 (ref 0–0.4)
EOSINOPHIL NFR BLD AUTO: 0.3 % (ref 0.3–6.2)
ERYTHROCYTE [DISTWIDTH] IN BLOOD BY AUTOMATED COUNT: 19.8 % (ref 12.3–15.4)
GLUCOSE SERPL-MCNC: 90 MG/DL (ref 65–99)
HCT VFR BLD AUTO: 40.1 % (ref 37.5–51)
HGB BLD-MCNC: 12.7 G/DL (ref 13–17.7)
IMM GRANULOCYTES # BLD AUTO: 0.09 10*3/MM3 (ref 0–0.05)
IMM GRANULOCYTES NFR BLD AUTO: 0.7 % (ref 0–0.5)
LYMPHOCYTES # BLD AUTO: 2.77 10*3/MM3 (ref 0.7–3.1)
LYMPHOCYTES NFR BLD AUTO: 22.6 % (ref 19.6–45.3)
MCH RBC QN AUTO: 27.5 PG (ref 26.6–33)
MCHC RBC AUTO-ENTMCNC: 31.7 G/DL (ref 31.5–35.7)
MCV RBC AUTO: 86.8 FL (ref 79–97)
MONOCYTES # BLD AUTO: 0.4 10*3/MM3 (ref 0.1–0.9)
MONOCYTES NFR BLD AUTO: 3.3 % (ref 5–12)
NEUTROPHILS NFR BLD AUTO: 72.6 % (ref 42.7–76)
NEUTROPHILS NFR BLD AUTO: 8.91 10*3/MM3 (ref 1.7–7)
NRBC BLD AUTO-RTO: 0 /100 WBC (ref 0–0.2)
PLATELET # BLD AUTO: 360 10*3/MM3 (ref 140–450)
PMV BLD AUTO: 10 FL (ref 6–12)
POTASSIUM SERPL-SCNC: 4.5 MMOL/L (ref 3.5–5.2)
RBC # BLD AUTO: 4.62 10*6/MM3 (ref 4.14–5.8)
SODIUM SERPL-SCNC: 136 MMOL/L (ref 136–145)
WBC NRBC COR # BLD AUTO: 12.27 10*3/MM3 (ref 3.4–10.8)

## 2024-10-19 PROCEDURE — 94799 UNLISTED PULMONARY SVC/PX: CPT

## 2024-10-19 PROCEDURE — 80048 BASIC METABOLIC PNL TOTAL CA: CPT | Performed by: STUDENT IN AN ORGANIZED HEALTH CARE EDUCATION/TRAINING PROGRAM

## 2024-10-19 PROCEDURE — 85025 COMPLETE CBC W/AUTO DIFF WBC: CPT | Performed by: STUDENT IN AN ORGANIZED HEALTH CARE EDUCATION/TRAINING PROGRAM

## 2024-10-19 PROCEDURE — 25010000002 ENOXAPARIN PER 10 MG: Performed by: STUDENT IN AN ORGANIZED HEALTH CARE EDUCATION/TRAINING PROGRAM

## 2024-10-19 PROCEDURE — 94761 N-INVAS EAR/PLS OXIMETRY MLT: CPT

## 2024-10-19 PROCEDURE — 94640 AIRWAY INHALATION TREATMENT: CPT

## 2024-10-19 PROCEDURE — 25010000002 LEVETRIRACETAM PER 10 MG: Performed by: STUDENT IN AN ORGANIZED HEALTH CARE EDUCATION/TRAINING PROGRAM

## 2024-10-19 PROCEDURE — 25010000002 METHYLPREDNISOLONE PER 40 MG: Performed by: STUDENT IN AN ORGANIZED HEALTH CARE EDUCATION/TRAINING PROGRAM

## 2024-10-19 PROCEDURE — 92610 EVALUATE SWALLOWING FUNCTION: CPT

## 2024-10-19 PROCEDURE — 25010000002 CEFTRIAXONE PER 250 MG: Performed by: STUDENT IN AN ORGANIZED HEALTH CARE EDUCATION/TRAINING PROGRAM

## 2024-10-19 PROCEDURE — 99233 SBSQ HOSP IP/OBS HIGH 50: CPT | Performed by: STUDENT IN AN ORGANIZED HEALTH CARE EDUCATION/TRAINING PROGRAM

## 2024-10-19 PROCEDURE — 94664 DEMO&/EVAL PT USE INHALER: CPT

## 2024-10-19 RX ORDER — METHYLPREDNISOLONE SODIUM SUCCINATE 40 MG/ML
20 INJECTION, POWDER, LYOPHILIZED, FOR SOLUTION INTRAMUSCULAR; INTRAVENOUS EVERY 12 HOURS
Status: DISCONTINUED | OUTPATIENT
Start: 2024-10-19 | End: 2024-10-20

## 2024-10-19 RX ORDER — BUDESONIDE AND FORMOTEROL FUMARATE DIHYDRATE 160; 4.5 UG/1; UG/1
2 AEROSOL RESPIRATORY (INHALATION)
Status: DISCONTINUED | OUTPATIENT
Start: 2024-10-19 | End: 2024-10-20 | Stop reason: HOSPADM

## 2024-10-19 RX ORDER — CLOPIDOGREL BISULFATE 75 MG/1
75 TABLET ORAL DAILY
Status: DISCONTINUED | OUTPATIENT
Start: 2024-10-19 | End: 2024-10-20 | Stop reason: HOSPADM

## 2024-10-19 RX ORDER — ATORVASTATIN CALCIUM 40 MG/1
80 TABLET, FILM COATED ORAL EVERY EVENING
Status: DISCONTINUED | OUTPATIENT
Start: 2024-10-19 | End: 2024-10-20 | Stop reason: HOSPADM

## 2024-10-19 RX ORDER — FINASTERIDE 5 MG/1
5 TABLET, FILM COATED ORAL DAILY
Status: DISCONTINUED | OUTPATIENT
Start: 2024-10-19 | End: 2024-10-20 | Stop reason: HOSPADM

## 2024-10-19 RX ORDER — SPIRONOLACTONE 25 MG/1
25 TABLET ORAL DAILY
Status: CANCELLED | OUTPATIENT
Start: 2024-10-19

## 2024-10-19 RX ORDER — ASPIRIN 81 MG/1
81 TABLET, CHEWABLE ORAL DAILY
Status: DISCONTINUED | OUTPATIENT
Start: 2024-10-19 | End: 2024-10-20 | Stop reason: HOSPADM

## 2024-10-19 RX ORDER — GUAIFENESIN/DEXTROMETHORPHAN 100-10MG/5
5 SYRUP ORAL 3 TIMES DAILY
Status: DISCONTINUED | OUTPATIENT
Start: 2024-10-19 | End: 2024-10-20 | Stop reason: HOSPADM

## 2024-10-19 RX ORDER — PANTOPRAZOLE SODIUM 40 MG/1
40 TABLET, DELAYED RELEASE ORAL
Status: DISCONTINUED | OUTPATIENT
Start: 2024-10-19 | End: 2024-10-20 | Stop reason: HOSPADM

## 2024-10-19 RX ORDER — LACTULOSE 10 G/15ML
20 SOLUTION ORAL 2 TIMES DAILY PRN
Status: DISCONTINUED | OUTPATIENT
Start: 2024-10-19 | End: 2024-10-20 | Stop reason: HOSPADM

## 2024-10-19 RX ORDER — ONDANSETRON 4 MG/1
4 TABLET, ORALLY DISINTEGRATING ORAL EVERY 6 HOURS PRN
Status: DISCONTINUED | OUTPATIENT
Start: 2024-10-19 | End: 2024-10-20 | Stop reason: HOSPADM

## 2024-10-19 RX ORDER — LEVOTHYROXINE SODIUM 88 UG/1
88 TABLET ORAL
Status: DISCONTINUED | OUTPATIENT
Start: 2024-10-20 | End: 2024-10-20 | Stop reason: HOSPADM

## 2024-10-19 RX ADMIN — DOXYCYCLINE 100 MG: 100 INJECTION, POWDER, LYOPHILIZED, FOR SOLUTION INTRAVENOUS at 10:01

## 2024-10-19 RX ADMIN — PANTOPRAZOLE SODIUM 40 MG: 40 TABLET, DELAYED RELEASE ORAL at 19:26

## 2024-10-19 RX ADMIN — IPRATROPIUM BROMIDE AND ALBUTEROL SULFATE 3 ML: 2.5; .5 SOLUTION RESPIRATORY (INHALATION) at 18:05

## 2024-10-19 RX ADMIN — FINASTERIDE 5 MG: 5 TABLET, FILM COATED ORAL at 19:26

## 2024-10-19 RX ADMIN — ASPIRIN 81 MG: 81 TABLET, CHEWABLE ORAL at 19:27

## 2024-10-19 RX ADMIN — Medication 10 ML: at 20:16

## 2024-10-19 RX ADMIN — DOXYCYCLINE 100 MG: 100 INJECTION, POWDER, LYOPHILIZED, FOR SOLUTION INTRAVENOUS at 22:16

## 2024-10-19 RX ADMIN — GUAIFENESIN AND DEXTROMETHORPHAN 5 ML: 100; 10 SYRUP ORAL at 20:16

## 2024-10-19 RX ADMIN — METHYLPREDNISOLONE SODIUM SUCCINATE 40 MG: 40 INJECTION, POWDER, FOR SOLUTION INTRAMUSCULAR; INTRAVENOUS at 09:48

## 2024-10-19 RX ADMIN — ATORVASTATIN CALCIUM 80 MG: 40 TABLET, FILM COATED ORAL at 19:26

## 2024-10-19 RX ADMIN — Medication 10 ML: at 09:49

## 2024-10-19 RX ADMIN — CEFTRIAXONE 1000 MG: 1 INJECTION, POWDER, FOR SOLUTION INTRAMUSCULAR; INTRAVENOUS at 11:10

## 2024-10-19 RX ADMIN — GUAIFENESIN AND DEXTROMETHORPHAN 5 ML: 100; 10 SYRUP ORAL at 14:53

## 2024-10-19 RX ADMIN — CLOPIDOGREL BISULFATE 75 MG: 75 TABLET ORAL at 19:26

## 2024-10-19 RX ADMIN — LEVETIRACETAM 750 MG: 250 TABLET, FILM COATED ORAL at 20:16

## 2024-10-19 RX ADMIN — LEVETIRACETAM 750 MG: 500 INJECTION, SOLUTION, CONCENTRATE INTRAVENOUS at 09:48

## 2024-10-19 RX ADMIN — IPRATROPIUM BROMIDE AND ALBUTEROL SULFATE 3 ML: 2.5; .5 SOLUTION RESPIRATORY (INHALATION) at 00:17

## 2024-10-19 RX ADMIN — METHYLPREDNISOLONE SODIUM SUCCINATE 20 MG: 40 INJECTION, POWDER, FOR SOLUTION INTRAMUSCULAR; INTRAVENOUS at 20:17

## 2024-10-19 RX ADMIN — IPRATROPIUM BROMIDE AND ALBUTEROL SULFATE 3 ML: 2.5; .5 SOLUTION RESPIRATORY (INHALATION) at 06:23

## 2024-10-19 RX ADMIN — ENOXAPARIN SODIUM 30 MG: 30 INJECTION SUBCUTANEOUS at 20:16

## 2024-10-19 NOTE — PLAN OF CARE
"Goal Outcome Evaluation:   Patient resting well during shift. Patient able to answer \"yes\" or \"No\" questions reorientation provided. No distress noted. Plan of care reviewed.                                          "

## 2024-10-19 NOTE — THERAPY EVALUATION
"Acute Care - Speech Language Pathology   Swallow Initial Evaluation [facility]  CLINICAL DYSPHAGIA ASSESSMENT     Patient Name: Demarcus Pastor  : 1933  MRN: [id number]  Today's Date: 10/19/2024     Admit Date: 10/18/2024  Demarcus Pastor  was seen at bedside this am on 3S-304B to assess safety/efficacy of swallowing fnx, determine safest/least restrictive diet tolerance. He has a PMH significant for advanced dementia, HTN, hx CVA w/ residual speech deficits, and GERD.     Mr Pastor presented to [facility] ED from his current skilled nursing facility residence, [facility], w/ possible seizure-like activity. Workup in the ER noted a CT brain which noted left posterior parietal encephalomalacia from prior stroke, initial lactic acid 9.6 but repeat 2.3. I discussed with his family regarding potential lumbar puncture who opted to forego it, noted patient was a DNR/DNI.     Mr Pastor is distantly familiar to SLP department of [facility] from prior outpatient MBS in May 2022 at which time he was evidenced w/ the following: \"...moderate oral dysphagia and mild to moderate pharyngeal dysphagia w/ intermittent silent aspiration of thin liquids. He is felt to most benefit from a continued modified po diet of puree solids and nectar thick liquids\".     Per discussion w/ staff at [facility] this am, Mr Pastor tolerates a modified po diet of mechanical soft textures and thin liquids w/ medications crushed at their facility.     Social History     Socioeconomic History    Marital status:    Tobacco Use    Smoking status: Never    Smokeless tobacco: Never   Vaping Use    Vaping status: Never Used   Substance and Sexual Activity    Alcohol use: No    Drug use: No    Sexual activity: Defer     Imaging:  Radiology Results (last 21 days)  Procedure Component Value Units Date/Time    CT Abdomen Pelvis With Contrast [[id number]] Sunday as Reviewed   Order Status: Completed Collected: 10/18/24 0258    Updated: " 10/18/24 1454   Narrative:     EXAM:    CT Abdomen and Pelvis With Intravenous Contrast     EXAM DATE:    10/18/2024 1:43 PM     CLINICAL HISTORY:    sepsis     TECHNIQUE:    Axial computed tomography images of the abdomen and pelvis with  intravenous contrast.  Sagittal and coronal reformatted images were  created and reviewed.  This CT exam was performed using one or more of  the following dose reduction techniques:  automated exposure control,  adjustment of the mA and/or kV according to patient size, and/or use of  iterative reconstruction technique.     COMPARISON:    January 27, 2024     FINDINGS:    LUNG BASES:  Unremarkable as visualized.  No mass.  No consolidation.    MEDIASTINUM:  Moderate-sized hiatal hernia.      ABDOMEN:    LIVER:  Unremarkable as visualized.  No mass.    GALLBLADDER AND BILE DUCTS:  Cholecystectomy clips.  Pneumobilia  noted.  No ductal dilation.    PANCREAS:  Unremarkable as visualized.  No mass.  No ductal dilation.    SPLEEN:  Unremarkable as visualized.  No splenomegaly.    ADRENALS:  Unremarkable as visualized.  No mass.    KIDNEYS AND URETERS:  Unremarkable as visualized.  No solid mass.  No  hydronephrosis.    STOMACH AND BOWEL:  Left inguinal hernia containing nondilated sigmoid  colon again.  Rectal vault abundant stool.  No mucosal thickening.      PELVIS:    APPENDIX:  No findings to suggest acute appendicitis.    BLADDER:  Unremarkable as visualized.  No mass.    REPRODUCTIVE:  Unremarkable as visualized.      ABDOMEN and PELVIS:    INTRAPERITONEAL SPACE:  Unremarkable as visualized.  No free air.  No  significant fluid collection.    BONES/JOINTS:  Degenerative disc disease throughout the lumbar spine.  Degenerative facet arthropathy throughout the lumbar spine, most  prominent in the lower lumbar spine.  No acute fracture.  No  dislocation.    SOFT TISSUES:  See above.    VASCULATURE:  Unremarkable as visualized.  No abdominal aortic  aneurysm.    LYMPH NODES:   Unremarkable as visualized.  No enlarged lymph nodes.      Impression:     1.  Moderate-sized hiatal hernia.  2.  Left inguinal hernia containing nondilated sigmoid colon again.  3.  Rectal vault abundant stool.  4.  Pneumobilia noted.  5.  Degenerative changes lumbar spine as described.     This report was finalized on 10/18/2024 2:52 PM by Dr. Anthony Pastor MD.       CT Chest With Contrast Diagnostic [[id number]] Sunday as Reviewed   Order Status: Completed Collected: 10/18/24 1449    Updated: 10/18/24 1452   Narrative:     EXAM:    CT Chest With Intravenous Contrast     EXAM DATE:    10/18/2024 1:43 PM     CLINICAL HISTORY:    sepsis     TECHNIQUE:    Axial computed tomography images of the chest with intravenous  contrast.  Sagittal and coronal reformatted images were created and  reviewed.  This CT exam was performed using one or more of the following  dose reduction techniques:  automated exposure control, adjustment of  the mA and/or kV according to patient size, and/or use of iterative  reconstruction technique.     COMPARISON:    No relevant prior studies available.     FINDINGS:    LIMITATIONS:  Please note that reported measurments are made manually,  as computer generated (AI) measurements can over measure lesions.  Additionally, lesions identified by AI may have be present presently,  significant nodules will be discussed in the report and the visual  depictions of lesions provided by AI are for general referency only.    LUNGS AND PLEURAL SPACES:  Minimal right upper lobe inflammatory  airspace disease.  No consolidation.  No significant effusion.    HEART:  Unremarkable as visualized.  No cardiomegaly.  No significant  pericardial effusion.  No significant coronary artery calcifications.    MEDIASTINUM:  Small to moderate-sized hiatal hernia.    BONES/JOINTS:  Unremarkable as visualized.  No acute fracture.  No  dislocation.    SOFT TISSUES:  Unremarkable as visualized.    VASCULATURE:  Unremarkable as  visualized.  No thoracic aortic  aneurysm.    LYMPH NODES:  Unremarkable as visualized.  No enlarged lymph nodes.      Impression:     1.  Minimal right upper lobe inflammatory airspace disease.  2.  Small to moderate-sized hiatal hernia.     This report was finalized on 10/18/2024 2:50 PM by Dr. Anthony Pastor MD.       CT Head Without Contrast [[id number]] Sunday as Reviewed   Order Status: Completed Collected: 10/18/24 1236    Updated: 10/18/24 1240   Narrative:     EXAM:    CT Head Without Intravenous Contrast     EXAM DATE:    10/18/2024 12:20 PM     CLINICAL HISTORY:    possible seizure like activity     TECHNIQUE:    Axial computed tomography images of the head/brain without intravenous  contrast.  Sagittal and coronal reformatted images were created and  reviewed.  This CT exam was performed using one or more of the following  dose reduction techniques:  automated exposure control, adjustment of  the mA and/or kV according to patient size, and/or use of iterative  reconstruction technique.     COMPARISON:    No relevant prior studies available.     FINDINGS:    BRAIN AND EXTRA-AXIAL SPACES:  Left posterior partial encephalomalacia  again noted.  No hemorrhage.  No significant white matter disease.    BONES/JOINTS:  Unremarkable as visualized.  No acute fracture.    SOFT TISSUES:  Unremarkable as visualized.    SINUSES:  Unremarkable as visualized.  No acute sinusitis.    MASTOID AIR CELLS:  Unremarkable as visualized.  No mastoid effusion.      Impression:       Left posterior partial encephalomalacia again noted.     This report was finalized on 10/18/2024 12:38 PM by Dr. Anthony Pastor MD.       XR Chest 1 View [[id number]] Sunday as Reviewed   Order Status: Completed Collected: 10/18/24 1145    Updated: 10/18/24 1147   Narrative:     EXAM:    XR Chest, 1 View     EXAM DATE:    10/18/2024 11:24 AM     CLINICAL HISTORY:    Simple Sepsis Protocol     TECHNIQUE:    Frontal view of the chest.     COMPARISON:     January 27, 2024     FINDINGS:    LUNGS AND PLEURAL SPACES:  Atelectasis in the lung bases.  No  pneumothorax.    HEART:  Unremarkable as visualized.  No cardiomegaly.    MEDIASTINUM:  Unremarkable as visualized.  Normal mediastinal contour.    BONES/JOINTS:  Unremarkable as visualized.  No acute fracture.    VASCULATURE:  Atherosclerotic disease.    UPPER ABDOMEN:  Interposing air of the right hemidiaphragm again  noted.      Impression:       No acute cardiopulmonary findings identified.     This report was finalized on 10/18/2024 11:45 AM by Dr. Anthony Pastor MD.        Labs:   Latest Reference Range & Units 10/18/24 11:35 10/19/24 01:01   WBC 3.40 - 10.80 10*3/mm3 10.81 (H) 12.27 (H)   RBC 4.14 - 5.80 10*6/mm3 4.87 4.62   Hemoglobin 13.0 - 17.7 g/dL 13.1 12.7 (L)   Hematocrit 37.5 - 51.0 % 42.8 40.1   Platelets 140 - 450 10*3/mm3 372 360   RDW 12.3 - 15.4 % 19.9 (H) 19.8 (H)   MCV 79.0 - 97.0 fL 87.9 86.8   MCH 26.6 - 33.0 pg 26.9 27.5   MCHC 31.5 - 35.7 g/dL 30.6 (L) 31.7   MPV 6.0 - 12.0 fL 9.9 10.0   RDW-SD 37.0 - 54.0 fl 63.8 (H) 62.6 (H)   (H): Data is abnormally high  (L): Data is abnormally low    Diet Orders (active) (From admission, onward)       Start     Ordered    10/19/24 0850  Diet: Regular/House; Feeding Assistance - Nursing; Texture: Pureed (NDD 1); Fluid Consistency: Thin (IDDSI 0)  Diet Effective Now        Comments: Meds crushed.   Direct 1:1 assistance for feeding.    10/19/24 0851                  Mr Pastor was observed on room air w/o complications across this assessment.     He was positioned upright and centered in bed to accept multiple po presentations of ice chips, solid cracker, puree, and thin liquids via spoon, cup, and straw.  He was unable to self provide po trials.     Facial/oral structures were symmetrical upon observation. Lingual protrusion revealed no deviation. Oral mucosa were moist, pink, and clean. Secretions were clear, thin, and well controlled. OROM/LOUIE was mild to  moderately weak overall to imitate oral postures. He is edentulous. Gag is not assessed. Volitional cough was intact w/ adequate intensity, clear in quality, non-productive. Voice was adequate in intensity, clear in quality w/ intelligible speech of primarily yes/no responses.    Upon po presentations, adequate bolus anticipation and acceptance w/ weak labial seal for bolus clearance via spoon bowl and cup rim stability w/ incomplete clearance of spoon bowl and anterior loss from cup rim. He was able to achieve suction via straw. Bolus preparation was prolonged and noted w/ lingual pumping of ice chips, puree textures, and solid consistencies w/ primarily phasic mastication pattern. A-p transit was delayed w/o significant oral residue appreciated w/ liquids or puree textures. No overt s/s aspiration before the swallow.      Pharyngeal swallow was slightly weak w/ slightly weak hyolaryngeal elevation per palpation. No overt s/s aspiration evidenced across this evaluation.     Visit Dx:     ICD-10-CM ICD-9-CM   1. Fever, unspecified fever cause  R50.9 780.60   2. Seizure-like activity  R56.9 780.39   3. Lactic acid acidosis  E87.20 276.2     Patient Active Problem List   Diagnosis    Esophageal stricture    Hypertension    Chronic back pain    Weight loss    Dementia    Elevated LFTs    Dysphagia    Colitis    Abnormal CT of the abdomen    Malnutrition    Iron deficiency anemia    Anemia due to vitamin B12 deficiency    Hypokalemia    Late onset 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    Anemia    Closed fracture of neck of right femur    Chronic HFrEF (heart failure with reduced ejection fraction)    Seizure     Past Medical History:   Diagnosis Date    Abnormal CT of the abdomen 6/19/2016    Alzheimer's disease with late onset     Anxiety     Atelectasis     Atherosclerotic heart disease     Cardiomyopathy     Colitis 6/19/2016    CVA (cerebral vascular accident)     Diverticulosis     Dysphagia     GERD  (gastroesophageal reflux disease)     Hemiplegia     History of transfusion     Hyperlipidemia     Hypertension     Hypertension     Hypokalemia     Inguinal hernia     Intracardiac thrombosis, not elsewhere classified     Iron deficiency anemia     Kidney stone     Malnutrition     Muscle weakness     Neuromuscular dysfunction of bladder     Pancreatitis      Past Surgical History:   Procedure Laterality Date    COLONOSCOPY N/A 11/9/2016    Procedure: COLONOSCOPY CPT CODE: 38412;  Surgeon: Lilliam Wilson DO;  Location: [facility] OR;  Service:     ENDOSCOPY N/A 6/17/2016    Procedure: ESOPHAGOGASTRODUODENOSCOPY;  Surgeon: Alex Robles III, MD;  Location: [facility] OR;  Service:     ENDOSCOPY N/A 11/9/2016    Procedure: ESOPHAGOGASTRODUODENOSCOPY WITH BIOPSY CPT CODE: 28919;  Surgeon: Lilliam Wilson DO;  Location: [facility] OR;  Service:     ESOPHAGEAL DILATATION      HIP BIPOLAR REPLACEMENT Right 9/12/2020    Procedure: HIP BIPOLAR REPLACEMENT;  Surgeon: Gustavo Araiza MD;  Location: [facility] OR;  Service: Orthopedics;  Laterality: Right;     Impression:     Mr Pastor presented w/ a moderate to moderately-severe oral dysphagia w/ weak labial seal, lingual pumping, prolonged bolus preparation, phasic mastication of puree and solid textures, and delayed a-p transit. He additionally presented w/ a generally weak pharyngeal swallow. No overt s/s aspiration were demonstrated across this assessment, however silent aspiration is unable to be ruled out via this subjective assessment.     Given Mr Pastor's baseline po diet at current residence includes thin liquids, no overt s/s aspiration evidenced across this assessment, afebrile status, and no need for supplemental O2 at this time, he is felt to most benefit from po diet initiation of puree textures and thin liquids w/ medications crushed in puree. He will require direct 1:1 assistance w/ feeding for all po intake.     SLP Recommendation and Plan     1. Puree  textures, thin liquids.    2. Medications crushed in puree/thins.   3. Upright and centered for all po intake  4. BRISA precautions.  5. Oral care protocol.  6. Direct 1:1 assistance w/ all po intake.   7. Universal aspiration precautions.   8. SLP to f/u for diet safety/tolerance and re-assessment    SLP to f/u.     D/w patient results and recommendations w/ verbal agreement.    D/w RN results and recommendations w/ verbal agreement.    Thank you for allowing me to participate in the care of your patient-  Jazmin Razo M.S., CCC-SLP        EDUCATION  The patient has been educated in the following areas:   Dysphagia (Swallowing Impairment) Oral Care/Hydration Modified Diet Instruction.        Time Calculation:       Jazmin Razo MS CCC-SLP  10/19/2024

## 2024-10-19 NOTE — PROGRESS NOTES
[facility] HOSPITALIST PROGRESS NOTE     Patient Identification:  Name:  Demarcus Pastor  Age:  89 y.o.  Sex:  male  :  1933  MRN:  [id number]  Visit Number:  [id number]  ROOM: [address] [place] [address]     Primary Care Provider:  Umm Butt MD    Length of stay in inpatient status:  1    Subjective     Chief Compliant:    Chief Complaint   Patient presents with    Tremors       History of Presenting Illness:    Patient seen in follow-up for seizure-like activity and pneumonia.  This morning patient is awake, alert and answers short replies appropriately to questions.  Able to nod his head yes and no.  At mental baseline.  Afebrile overnight.  No reports of seizure-like activity.  No adverse events reported.    Objective     Current Hospital Meds:cefTRIAXone, 1,000 mg, Intravenous, Q24H  doxycycline, 100 mg, Intravenous, Q12H  enoxaparin, 30 mg, Subcutaneous, Nightly  guaiFENesin-dextromethorphan, 5 mL, Oral, TID  iopamidol, 100 mL, Intravenous, Once in imaging  ipratropium-albuterol, 3 mL, Nebulization, 4x Daily - RT  levETIRAcetam, 750 mg, Intravenous, Q12H  methylPREDNISolone sodium succinate, 20 mg, Intravenous, Q12H  sodium chloride, 10 mL, Intravenous, Q12H         Current Antimicrobial Therapy:  Anti-Infectives (From admission, onward)      Ordered     Dose/Rate Route Frequency Start Stop    10/18/24 2055  cefTRIAXone (ROCEPHIN) 1,000 mg in sodium chloride 0.9 % 100 mL IVPB-VTB        Ordering Provider: Shaji Soria DO    1,000 mg  200 mL/hr over 30 Minutes Intravenous Every 24 Hours 10/19/24 1200 10/26/24 1159    10/18/24 2055  doxycycline (VIBRAMYCIN) 100 mg in sodium chloride 0.9 % 100 mL IVPB-VTB        Ordering Provider: Shaji Soria DO    100 mg  over 60 Minutes Intravenous Every 12 Hours 10/18/24 2200 10/23/24 2159    10/18/24 1658  azithromycin (ZITHROMAX) 500 mg in sodium chloride 0.9 % 250 mL IVPB-VTB        Ordering Provider: Willie Caban   mg  over 60 Minutes Intravenous Once 10/18/24 1714 10/18/24 1809    10/18/24 1108  cefTRIAXone (ROCEPHIN) 1,000 mg in sodium chloride 0.9 % 100 mL IVPB-VTB        Ordering Provider: Willie Caban DO    1,000 mg  200 mL/hr over 30 Minutes Intravenous Once 10/18/24 1124 10/18/24 1256          Current Diuretic Therapy:  Diuretics (From admission, onward)      None          ----------------------------------------------------------------------------------------------------------------------  Vital Signs:  Temp:  [97.7 °F (36.5 °C)-97.9 °F (36.6 °C)] 97.8 °F (36.6 °C)  Heart Rate:  [50-70] 58  Resp:  [18-22] 18  BP: ()/(38-75) 100/52  SpO2:  [92 %-98 %] 96 %  on   ;   Device (Oxygen Therapy): room air  Body mass index is 19.76 kg/m².    Wt Readings from Last 3 Encounters:   10/19/24 62.5 kg (137 lb 11.2 oz)   07/25/24 55.3 kg (122 lb)   04/25/24 55.6 kg (122 lb 9.6 oz)     Intake & Output (last 3 days)         10/16 0701  10/17 0700 10/17 0701  10/18 0700 10/18 0701  10/19 0700 10/19 0701  10/20 0700    P.O.    240    IV Piggyback   2350     Total Intake(mL/kg)   2350 (37.6) 240 (3.8)    Urine (mL/kg/hr)   500 950 (1.4)    Stool   0 0    Total Output   500 950    Net   +1850 -710            Urine Unmeasured Occurrence   1 x     Stool Unmeasured Occurrence   1 x 1 x          Diet: Regular/House; Feeding Assistance - Nursing; Texture: Pureed (NDD 1); Fluid Consistency: Thin (IDDSI 0)  ----------------------------------------------------------------------------------------------------------------------  Physical exam:  Constitutional: Elderly male, chronically ill-appearing, tremulous, NAD.      HENT:  Head: Normocephalic and atraumatic.  Mouth:  Moist mucous membranes.    Eyes:  Conjunctivae and EOM are normal.  No scleral icterus.  Neck:  Neck supple.  No JVD present.    Cardiovascular:  Normal rate, regular rhythm and normal heart sounds with no murmur.  Pulmonary/Chest:  No respiratory distress, no wheezes, no  "crackles, with normal breath sounds and good air movement.  Abdominal:  Soft.  Bowel sounds are normal.  No distension and no tenderness.   Musculoskeletal:  No tenderness, and no deformity.  No red or swollen joints anywhere.    Neurological: Alert, at baseline, short answer replys appropriately, nods yes and no  Skin:  Skin is warm and dry.  No rash noted.  No pallor.   Peripheral vascular:  No edema and pulses on all 4 extremities.     ----------------------------------------------------------------------------------------------------------------------  Results from last 7 days   Lab Units 10/19/24  0101 10/18/24  1831 10/18/24  1504 10/18/24  1135   CRP mg/dL  --   --   --  <0.30   LACTATE mmol/L  --  1.3 2.3* 9.6*   WBC 10*3/mm3 12.27*  --   --  10.81*   HEMOGLOBIN g/dL 12.7*  --   --  13.1   HEMATOCRIT % 40.1  --   --  42.8   MCV fL 86.8  --   --  87.9   MCHC g/dL 31.7  --   --  30.6*   PLATELETS 10*3/mm3 360  --   --  372         Results from last 7 days   Lab Units 10/19/24  0101 10/18/24  1135   SODIUM mmol/L 136 135*   POTASSIUM mmol/L 4.5 4.0   CHLORIDE mmol/L 108* 101   CO2 mmol/L 16.9* 12.3*   BUN mg/dL 14 14   CREATININE mg/dL 1.09 1.36*   CALCIUM mg/dL 8.4 8.8   GLUCOSE mg/dL 90 121*   ALBUMIN g/dL  --  3.4*   BILIRUBIN mg/dL  --  0.9   ALK PHOS U/L  --  76   AST (SGOT) U/L  --  15   ALT (SGPT) U/L  --  9   Estimated Creatinine Clearance: 39 mL/min (by C-G formula based on SCr of 1.09 mg/dL).  No results found for: \"AMMONIA\"  Results from last 7 days   Lab Units 10/18/24  1135   CK TOTAL U/L 74             No results found for: \"HGBA1C\", \"POCGLU\"  Lab Results   Component Value Date    TSH 5.230 (H) 11/10/2019    FREET4 0.98 11/11/2019     No results found for: \"PREGTESTUR\", \"PREGSERUM\", \"HCG\", \"HCGQUANT\"  Pain Management Panel          Latest Ref Rng & Units 1/13/2014   Pain Management Panel   Amphetamine, Urine Qual NEGATIVE Negative    Barbiturates Screen, Urine NEGATIVE Negative    Benzodiazepine " "Screen, Urine NEGATIVE Negative    Cocaine Screen, Urine NEGATIVE Negative    Methadone Screen , Urine NEGATIVE Negative       Details                 Brief Urine Lab Results  (Last result in the past 365 days)        Color   Clarity   Blood   Leuk Est   Nitrite   Protein   CREAT   Urine HCG        10/18/24 1137 Yellow   Clear   Negative   Negative   Negative   Negative                 Blood Culture   Date Value Ref Range Status   10/18/2024 No growth at 24 hours  Preliminary   10/18/2024 No growth at 24 hours  Preliminary     No results found for: \"URINECX\"  No results found for: \"WOUNDCX\"  No results found for: \"STOOLCX\"  No results found for: \"RESPCX\"  No results found for: \"AFBCX\"  Results from last 7 days   Lab Units 10/18/24  1831 10/18/24  1504 10/18/24  1135   PROCALCITONIN ng/mL  --   --  0.06   LACTATE mmol/L 1.3 2.3* 9.6*   CRP mg/dL  --   --  <0.30       I have personally looked at the labs and they are summarized above.  ----------------------------------------------------------------------------------------------------------------------  Detailed radiology reports for the last 24 hours:  Imaging Results (Last 24 Hours)       ** No results found for the last 24 hours. **          Assessment & Plan      Patient is a 89-year-old male with history significant for prior ischemic stroke with residual speech deficits/hemiplegia, hypertension, advanced dementia who presented from the nursing home with possible seizure-like activity.      #Seizure-like activity  #Chronic encephalomalacia from prior stroke  #Sepsis due to community-acquired pneumonia, bacterial, unspecified  #Prior ischemic stroke with residual hemiplegia/speech deficits  #Advanced dementia without behavioral disturbances  #Essential hypertension  #Chronic HFrEF not in acute exacerbation  #Lactic acidosis, likely due to the above  #CKD stage IIIb  #Elevated anion gap metabolic acidosis due to lactic acidosis     --Patient presented w/ " seizure-like activity from the nursing home, difficult to tell if he was postictal due to baseline dementia.  Some credibility to potential seizure given significant lactic acidosis which clinically improved without much intervention  --Admission labs showed initial lactic 9.6, repeat 2.3, Tmax 100.9  --Blood cultures NGTD, urinalysis unremarkable, respiratory panel negative  --CT head noted encephalomalacia  --CT chest right upper lobe pneumonia  --CT abdomen pelvis hiatal hernia, left inguinal hernia, nonincarcerated  --Decrease Solu-Medrol to 20 mg twice daily, DuoNebs, mucolytic's  --Continue Rocephin/Doxy  --Continue Keppra switch to p.o.  --Continue to monitor on telemetry, possibly back to nursing facility tomorrow     Copied portions of this report have been reviewed and are accurate as of 10/19/24     Checklist:  Antibiotics: Rocephin/Doxy  Steroids: Solu-Medrol  DVT ppx: lovenox  GI ppx: ppi  Diet: Modified  Code: No CPR, limited  Risk/dispo: Patient is adequately improving, has been afebrile with no further concerns for seizures.  Possibly discharge back to nursing facility tomorrow.    Shaji Soria,   [facility] Hospitalist  10/19/24  18:02 EDT

## 2024-10-19 NOTE — PLAN OF CARE
Pt was admitted this shift and has been resting in bed. No s/s of acute distress noted. Plan of care is ongoing at this time.

## 2024-10-20 VITALS
RESPIRATION RATE: 18 BRPM | HEART RATE: 68 BPM | TEMPERATURE: 98.5 F | WEIGHT: 136.4 LBS | OXYGEN SATURATION: 94 % | BODY MASS INDEX: 19.53 KG/M2 | HEIGHT: 70 IN | SYSTOLIC BLOOD PRESSURE: 109 MMHG | DIASTOLIC BLOOD PRESSURE: 59 MMHG

## 2024-10-20 LAB
ANION GAP SERPL CALCULATED.3IONS-SCNC: 11.9 MMOL/L (ref 5–15)
BASOPHILS # BLD AUTO: 0.02 10*3/MM3 (ref 0–0.2)
BASOPHILS NFR BLD AUTO: 0.1 % (ref 0–1.5)
BUN SERPL-MCNC: 25 MG/DL (ref 8–23)
BUN/CREAT SERPL: 20.8 (ref 7–25)
CALCIUM SPEC-SCNC: 8.5 MG/DL (ref 8.2–9.6)
CHLORIDE SERPL-SCNC: 109 MMOL/L (ref 98–107)
CO2 SERPL-SCNC: 16.1 MMOL/L (ref 22–29)
CREAT SERPL-MCNC: 1.2 MG/DL (ref 0.76–1.27)
DEPRECATED RDW RBC AUTO: 63.7 FL (ref 37–54)
EGFRCR SERPLBLD CKD-EPI 2021: 57.1 ML/MIN/1.73
EOSINOPHIL # BLD AUTO: 0 10*3/MM3 (ref 0–0.4)
EOSINOPHIL NFR BLD AUTO: 0 % (ref 0.3–6.2)
ERYTHROCYTE [DISTWIDTH] IN BLOOD BY AUTOMATED COUNT: 20.6 % (ref 12.3–15.4)
GLUCOSE SERPL-MCNC: 111 MG/DL (ref 65–99)
HCT VFR BLD AUTO: 41.1 % (ref 37.5–51)
HGB BLD-MCNC: 13.1 G/DL (ref 13–17.7)
IMM GRANULOCYTES # BLD AUTO: 0.17 10*3/MM3 (ref 0–0.05)
IMM GRANULOCYTES NFR BLD AUTO: 0.9 % (ref 0–0.5)
LYMPHOCYTES # BLD AUTO: 0.94 10*3/MM3 (ref 0.7–3.1)
LYMPHOCYTES NFR BLD AUTO: 5.1 % (ref 19.6–45.3)
MCH RBC QN AUTO: 27.6 PG (ref 26.6–33)
MCHC RBC AUTO-ENTMCNC: 31.9 G/DL (ref 31.5–35.7)
MCV RBC AUTO: 86.5 FL (ref 79–97)
MONOCYTES # BLD AUTO: 0.52 10*3/MM3 (ref 0.1–0.9)
MONOCYTES NFR BLD AUTO: 2.8 % (ref 5–12)
NEUTROPHILS NFR BLD AUTO: 16.68 10*3/MM3 (ref 1.7–7)
NEUTROPHILS NFR BLD AUTO: 91.1 % (ref 42.7–76)
NRBC BLD AUTO-RTO: 0 /100 WBC (ref 0–0.2)
PLATELET # BLD AUTO: 376 10*3/MM3 (ref 140–450)
PMV BLD AUTO: 10.2 FL (ref 6–12)
POTASSIUM SERPL-SCNC: 4.6 MMOL/L (ref 3.5–5.2)
RBC # BLD AUTO: 4.75 10*6/MM3 (ref 4.14–5.8)
SODIUM SERPL-SCNC: 137 MMOL/L (ref 136–145)
WBC NRBC COR # BLD AUTO: 18.33 10*3/MM3 (ref 3.4–10.8)

## 2024-10-20 PROCEDURE — 94664 DEMO&/EVAL PT USE INHALER: CPT

## 2024-10-20 PROCEDURE — 85025 COMPLETE CBC W/AUTO DIFF WBC: CPT | Performed by: STUDENT IN AN ORGANIZED HEALTH CARE EDUCATION/TRAINING PROGRAM

## 2024-10-20 PROCEDURE — 94799 UNLISTED PULMONARY SVC/PX: CPT

## 2024-10-20 PROCEDURE — 99239 HOSP IP/OBS DSCHRG MGMT >30: CPT | Performed by: STUDENT IN AN ORGANIZED HEALTH CARE EDUCATION/TRAINING PROGRAM

## 2024-10-20 PROCEDURE — 25010000002 CEFTRIAXONE PER 250 MG: Performed by: STUDENT IN AN ORGANIZED HEALTH CARE EDUCATION/TRAINING PROGRAM

## 2024-10-20 PROCEDURE — 63710000001 PREDNISONE PER 1 MG: Performed by: STUDENT IN AN ORGANIZED HEALTH CARE EDUCATION/TRAINING PROGRAM

## 2024-10-20 PROCEDURE — 80048 BASIC METABOLIC PNL TOTAL CA: CPT | Performed by: STUDENT IN AN ORGANIZED HEALTH CARE EDUCATION/TRAINING PROGRAM

## 2024-10-20 RX ORDER — IPRATROPIUM BROMIDE AND ALBUTEROL SULFATE 2.5; .5 MG/3ML; MG/3ML
3 SOLUTION RESPIRATORY (INHALATION) 4 TIMES DAILY PRN
Qty: 360 ML | Refills: 0 | Status: SHIPPED | OUTPATIENT
Start: 2024-10-20

## 2024-10-20 RX ORDER — LEVETIRACETAM 500 MG/1
500 TABLET ORAL 2 TIMES DAILY
Qty: 60 TABLET | Refills: 0 | Status: SHIPPED | OUTPATIENT
Start: 2024-10-20 | End: 2024-11-19

## 2024-10-20 RX ORDER — CEFDINIR 300 MG/1
300 CAPSULE ORAL 2 TIMES DAILY
Qty: 14 CAPSULE | Refills: 0 | Status: SHIPPED | OUTPATIENT
Start: 2024-10-20 | End: 2024-10-27

## 2024-10-20 RX ORDER — DOXYCYCLINE 100 MG/1
100 CAPSULE ORAL 2 TIMES DAILY
Qty: 10 CAPSULE | Refills: 0 | Status: SHIPPED | OUTPATIENT
Start: 2024-10-20 | End: 2024-10-25

## 2024-10-20 RX ORDER — PREDNISONE 20 MG/1
40 TABLET ORAL
Status: DISCONTINUED | OUTPATIENT
Start: 2024-10-20 | End: 2024-10-20 | Stop reason: HOSPADM

## 2024-10-20 RX ORDER — CEFDINIR 300 MG/1
300 CAPSULE ORAL ONCE
Status: DISCONTINUED | OUTPATIENT
Start: 2024-10-20 | End: 2024-10-20 | Stop reason: HOSPADM

## 2024-10-20 RX ORDER — PREDNISONE 20 MG/1
20 TABLET ORAL
Qty: 4 TABLET | Refills: 0 | Status: SHIPPED | OUTPATIENT
Start: 2024-10-21 | End: 2024-10-25

## 2024-10-20 RX ORDER — DOXYCYCLINE 100 MG/1
100 CAPSULE ORAL ONCE
Status: DISCONTINUED | OUTPATIENT
Start: 2024-10-20 | End: 2024-10-20 | Stop reason: HOSPADM

## 2024-10-20 RX ADMIN — PREDNISONE 40 MG: 20 TABLET ORAL at 08:51

## 2024-10-20 RX ADMIN — IPRATROPIUM BROMIDE AND ALBUTEROL SULFATE 3 ML: 2.5; .5 SOLUTION RESPIRATORY (INHALATION) at 12:07

## 2024-10-20 RX ADMIN — FINASTERIDE 5 MG: 5 TABLET, FILM COATED ORAL at 08:51

## 2024-10-20 RX ADMIN — IPRATROPIUM BROMIDE AND ALBUTEROL SULFATE 3 ML: 2.5; .5 SOLUTION RESPIRATORY (INHALATION) at 06:24

## 2024-10-20 RX ADMIN — ASPIRIN 81 MG: 81 TABLET, CHEWABLE ORAL at 08:49

## 2024-10-20 RX ADMIN — BUDESONIDE AND FORMOTEROL FUMARATE DIHYDRATE 2 PUFF: 160; 4.5 AEROSOL RESPIRATORY (INHALATION) at 06:40

## 2024-10-20 RX ADMIN — CLOPIDOGREL BISULFATE 75 MG: 75 TABLET ORAL at 08:51

## 2024-10-20 RX ADMIN — DOXYCYCLINE 100 MG: 100 INJECTION, POWDER, LYOPHILIZED, FOR SOLUTION INTRAVENOUS at 09:00

## 2024-10-20 RX ADMIN — CEFTRIAXONE 1000 MG: 1 INJECTION, POWDER, FOR SOLUTION INTRAMUSCULAR; INTRAVENOUS at 11:12

## 2024-10-20 RX ADMIN — LEVETIRACETAM 750 MG: 250 TABLET, FILM COATED ORAL at 08:48

## 2024-10-20 RX ADMIN — Medication 10 ML: at 08:51

## 2024-10-20 RX ADMIN — LEVOTHYROXINE SODIUM 88 MCG: 88 TABLET ORAL at 08:50

## 2024-10-20 RX ADMIN — GUAIFENESIN AND DEXTROMETHORPHAN 5 ML: 100; 10 SYRUP ORAL at 08:50

## 2024-10-20 RX ADMIN — PANTOPRAZOLE SODIUM 40 MG: 40 TABLET, DELAYED RELEASE ORAL at 08:50

## 2024-10-20 RX ADMIN — TIOTROPIUM BROMIDE INHALATION SPRAY 2 PUFF: 3.12 SPRAY, METERED RESPIRATORY (INHALATION) at 06:39

## 2024-10-20 NOTE — DISCHARGE SUMMARY
[facility] HOSPITALISTS DISCHARGE SUMMARY    Patient Identification:  Name:  Demarcus Pastor  Age:  89 y.o.  Sex:  male  :  1933  MRN:  [id number]  Visit Number:  [id number]    Date of Admission: 10/18/2024  Date of Discharge:  10/20/2024     PCP: Umm Butt MD    DISCHARGE DIAGNOSIS  #Seizure-like activity  #Chronic encephalomalacia from prior stroke  #Sepsis due to community-acquired pneumonia, bacterial, unspecified  #Prior ischemic stroke with residual hemiplegia/speech deficits  #Advanced dementia without behavioral disturbances  #Essential hypertension  #Chronic HFrEF not in acute exacerbation  #Lactic acidosis, likely due to the above  #CKD stage IIIb  #Elevated anion gap metabolic acidosis due to lactic acidosis    CONSULTS   None    PROCEDURES PERFORMED  None    HOSPITAL COURSE  Patient is a 89 y.o. male presented to [facility] from nursing facility with possible seizure-like activity.  Please see the admitting history and physical for further details.  Patient did not have a prior diagnosis of seizures but had chronic large area of encephalomalacia from prior stroke which was felt to be his greatest risk factor for legitimate seizure disorder.  On initial presentation, he was febrile to 100.9 and had a lactic acid of 9.6 which did give acute ability to potential seizure.  Workup noted right upper lobe pneumonia and he was started on empiric antibiotics.  Patient was started on Keppra on admission and had no issues with the medication throughout the hospital course.    On the day of discharge, patient was at his mental baseline.  He had been afebrile and had no concerns for seizure-like activity throughout the hospital course.  I discussed with his POA on the day of discharge, all questions answered, no concerns voiced and patient was discharged back to the nursing facility with instructions to follow-up with PCP and subspecialties as noted below.    VITAL  SIGNS:  Temp:  [97.8 °F (36.6 °C)-98.7 °F (37.1 °C)] 98.3 °F (36.8 °C)  Heart Rate:  [53-77] 77  Resp:  [16-20] 16  BP: ()/(49-64) 103/53  SpO2:  [92 %-98 %] 96 %  on   ;   Device (Oxygen Therapy): room air    Body mass index is 19.57 kg/m².  Wt Readings from Last 3 Encounters:   10/20/24 61.9 kg (136 lb 6.4 oz)   07/25/24 55.3 kg (122 lb)   04/25/24 55.6 kg (122 lb 9.6 oz)       PHYSICAL EXAM:  Constitutional: Elderly male, chronically ill-appearing, resting in bed comfortably, NAD.      HENT:  Head: Normocephalic and atraumatic.  Mouth:  Moist mucous membranes.    Eyes:  Conjunctivae and EOM are normal.  No scleral icterus.  Neck:  Neck supple.  No JVD present.    Cardiovascular:  Normal rate, regular rhythm and normal heart sounds with no murmur.  Pulmonary/Chest:  No respiratory distress, no wheezes, no crackles, with normal breath sounds and good air movement.  Abdominal:  Soft.  Bowel sounds are normal.  No distension and no tenderness.   Musculoskeletal:  No tenderness, and no deformity.  No red or swollen joints anywhere.    Neurological: Alert, at baseline, short answer replys appropriately, nods yes and no  Skin:  Skin is warm and dry.  No rash noted.  No pallor.   Peripheral vascular:  No edema and pulses on all 4 extremities.    DISCHARGE DISPOSITION   Stable    DISCHARGE MEDICATIONS:     Discharge Medications        New Medications        Instructions Start Date   cefdinir 300 MG capsule  Commonly known as: OMNICEF   300 mg, Oral, 2 Times Daily      doxycycline 100 MG capsule  Commonly known as: VIBRAMYCIN   100 mg, Oral, 2 Times Daily      ipratropium-albuterol 0.5-2.5 mg/3 ml nebulizer  Commonly known as: DUO-NEB   3 mL, Nebulization, 4 Times Daily PRN      levETIRAcetam 500 MG tablet  Commonly known as: KEPPRA   500 mg, Oral, 2 Times Daily      predniSONE 20 MG tablet  Commonly known as: DELTASONE   20 mg, Oral, Daily With Breakfast   Start Date: October 21, 2024            Continue These  Medications        Instructions Start Date   acetaminophen 500 MG tablet  Commonly known as: TYLENOL   500 mg, Oral, Every 6 Hours PRN      aspirin 81 MG chewable tablet   81 mg, Oral, Daily      atorvastatin 80 MG tablet  Commonly known as: LIPITOR   80 mg, Oral, Every Evening      cetaphil cream   1 Application, Topical, Daily      clopidogrel 75 MG tablet  Commonly known as: PLAVIX   75 mg, Daily      cyanocobalamin 1000 MCG/ML injection   1,000 mcg, Every 30 Days      finasteride 5 MG tablet  Commonly known as: PROSCAR   5 mg, Daily      lactulose 10 GM/15ML solution  Commonly known as: CHRONULAC   20 g, Oral, 2 Times Daily PRN      levothyroxine 88 MCG tablet  Commonly known as: SYNTHROID, LEVOTHROID   88 mcg, Oral, Daily      ondansetron 4 MG tablet  Commonly known as: ZOFRAN   4 mg, Oral, Every 6 Hours PRN      pantoprazole 40 MG EC tablet  Commonly known as: PROTONIX   40 mg, 2 Times Daily      Trelegy Ellipta 200-62.5-25 MCG/ACT inhaler  Generic drug: Fluticasone-Umeclidin-Vilant   1 puff, Inhalation, Daily - RT      triamcinolone 0.1 % cream  Commonly known as: KENALOG   1 Application, Topical, Daily             Stop These Medications      sacubitril-valsartan  MG tablet  Commonly known as: ENTRESTO     spironolactone 25 MG tablet  Commonly known as: ALDACTONE                Additional Instructions for the Follow-ups that You Need to Schedule       Discharge Follow-up with PCP   As directed       Currently Documented PCP:    Umm Butt MD    PCP Phone Number:    [phone]     [facility]        Discharge Follow-up with Specialty: neuro clinic; 1 Month   As directed      Specialty: neuro clinic   Follow Up: 1 Month   Follow Up Details: new onset seizure- prior stroke w/ sig encephalomalacia               Follow-up Information       Umm Butt MD .    Specialty: Geriatric Medicine  [facility]  Contact information:  [address]  [id number]  [phone]                              TEST  RESULTS PENDING AT DISCHARGE  Pending Labs       Order Current Status    Blood Culture - Blood, Arm, Left Preliminary result    Blood Culture - Blood, Arm, Right Preliminary result             CODE STATUS  Code Status and Medical Interventions: No CPR (Do Not Attempt to Resuscitate); Limited Support; No intubation (DNI), No cardioversion, No dialysis; son via phone   Ordered at: 10/18/24 1801     Medical Intervention Limits:    No intubation (DNI)    No cardioversion    No dialysis     Level Of Support Discussed With:    Next of Kin (If No Surrogate)     Code Status (Patient has no pulse and is not breathing):    No CPR (Do Not Attempt to Resuscitate)     Medical Interventions (Patient has pulse or is breathing):    Limited Support     Comments:    son via phone       Shaji Soria DO  [facility]  10/20/24  10:20 EDT    Please note that this discharge summary required more than 30 minutes to complete.

## 2024-10-20 NOTE — DISCHARGE INSTR - APPOINTMENTS
Pt  discharged  back  to  [facility]  and  rehab    pt  has  an  apointment  with  prieto newsome  for  oct 28  at  1 :45   Detail Level: Zone

## 2024-10-20 NOTE — PLAN OF CARE
Pt has been resting in bed this shift. No s/s of acute distress noted. Plan of care is ongoing at this time.

## 2024-10-20 NOTE — PLAN OF CARE
Goal Outcome Evaluation:    Pt is being discharged back to [facility]. Called and spoke to [facility] and report has been accepted. DO Soria spoke with son Vj and made him aware that pt would be returning to nursing home today. Pt ready for transport.

## 2024-10-23 LAB
BACTERIA SPEC AEROBE CULT: NORMAL
BACTERIA SPEC AEROBE CULT: NORMAL

## 2024-10-28 ENCOUNTER — OFFICE VISIT (OUTPATIENT)
Dept: CARDIOLOGY | Facility: CLINIC | Age: 89
End: 2024-10-28
Payer: MEDICARE

## 2024-10-28 VITALS
HEIGHT: 70 IN | DIASTOLIC BLOOD PRESSURE: 77 MMHG | WEIGHT: 126.6 LBS | OXYGEN SATURATION: 90 % | HEART RATE: 74 BPM | SYSTOLIC BLOOD PRESSURE: 118 MMHG | BODY MASS INDEX: 18.12 KG/M2

## 2024-10-28 DIAGNOSIS — F02.80 LATE ONSET ALZHEIMER'S DISEASE WITHOUT BEHAVIORAL DISTURBANCE: ICD-10-CM

## 2024-10-28 DIAGNOSIS — I10 PRIMARY HYPERTENSION: Chronic | ICD-10-CM

## 2024-10-28 DIAGNOSIS — I50.22 CHRONIC HFREF (HEART FAILURE WITH REDUCED EJECTION FRACTION): Primary | Chronic | ICD-10-CM

## 2024-10-28 DIAGNOSIS — G30.1 LATE ONSET ALZHEIMER'S DISEASE WITHOUT BEHAVIORAL DISTURBANCE: ICD-10-CM

## 2024-10-28 NOTE — PROGRESS NOTES
"Chief Complaint  Follow-up (Nursing [facility])    Subjective          Demarcus Pastor presents to [facility] CARDIOLOGY for follow up.    History of Present Illness  History of Present Illness  The patient presents for a follow-up visit. He is accompanied by a caregiver from his skilled nursing facility he was not familiar with his plan of care.    He reports feeling well overall.  He denies experiencing any chest pain, shortness of breath, or other troublesome symptom.. His son is responsible for making his medical decisions.       Tobacco Use: Low Risk  (10/30/2024)    Patient History     Smoking Tobacco Use: Never     Smokeless Tobacco Use: Never     Passive Exposure: Not on file       Objective     Vital Signs:   /77 (BP Location: Left arm, Patient Position: Sitting, Cuff Size: Adult)   Pulse 74   Ht 177.8 cm (70\")   Wt 57.4 kg (126 lb 9.6 oz)   SpO2 90%   BMI 18.17 kg/m²       Physical Exam  Vitals and nursing note reviewed. Exam conducted with a chaperone present (SNF staff).   Constitutional:       General: He is not in acute distress.     Appearance: He is cachectic. He is ill-appearing.   HENT:      Head: Normocephalic and atraumatic.   Eyes:      Conjunctiva/sclera: Conjunctivae normal.   Cardiovascular:      Rate and Rhythm: Normal rate and regular rhythm.   Pulmonary:      Effort: Pulmonary effort is normal.      Breath sounds: Normal breath sounds.   Musculoskeletal:      Comments: In a wheelchair   Skin:     General: Skin is warm and dry.      Coloration: Skin is pale.   Neurological:      Mental Status: Mental status is at baseline.   Psychiatric:         Mood and Affect: Mood normal.             Result Review :   The following data was reviewed by: THAO Shook on 10/28/2024:  Common labs          10/18/2024    11:35 10/19/2024    01:01 10/20/2024    01:03   Common Labs   Glucose 121  90  111    BUN 14  14  25    Creatinine 1.36  1.09  1.20    Sodium 135  136  137 "    Potassium 4.0  4.5  4.6    Chloride 101  108  109    Calcium 8.8  8.4  8.5    Albumin 3.4      Total Bilirubin 0.9      Alkaline Phosphatase 76      AST (SGOT) 15      ALT (SGPT) 9      WBC 10.81  12.27  18.33    Hemoglobin 13.1  12.7  13.1    Hematocrit 42.8  40.1  41.1    Platelets 372  360  376        Data reviewed : Cardiology studies as detailed below      Last Cardiac Cath      Last Stress test       Last Echo  Results for orders placed during the hospital encounter of 09/11/20    Adult Transthoracic Echo Complete W/ Cont if Necessary Per Protocol    Interpretation Summary  · Ejection fraction appears to be 36 - 40%. Left ventricular systolic function is moderately decreased. Left ventricular diastolic function is consistent with (grade I) impaired relaxation and age.  · The following left ventricular wall segments are hypokinetic: apical anterior, apical septal, apex hypokinetic and mid anteroseptal.  · There is a trivial pericardial effusion  · No significant changes to previous study             Current Outpatient Medications   Medication Sig Dispense Refill    acetaminophen (TYLENOL) 500 MG tablet Take 1 tablet by mouth Every 6 (Six) Hours As Needed for Mild Pain.      aspirin 81 MG chewable tablet Chew 1 tablet Daily.      atorvastatin (LIPITOR) 80 MG tablet Take 1 tablet by mouth Every Evening.      clopidogrel (PLAVIX) 75 MG tablet Take 1 tablet by mouth Daily.      cyanocobalamin 1000 MCG/ML injection Inject 1 mL into the appropriate muscle as directed by prescriber Every 30 (Thirty) Days.      Emollient (cetaphil) cream Apply 1 Application topically to the appropriate area as directed Daily.      finasteride (PROSCAR) 5 MG tablet Take 1 tablet by mouth Daily.      Fluticasone-Umeclidin-Vilant (Trelegy Ellipta) 200-62.5-25 MCG/ACT inhaler Inhale 1 puff Daily.      ipratropium-albuterol (DUO-NEB) 0.5-2.5 mg/3 ml nebulizer Take 3 mL by nebulization 4 (Four) Times a Day As Needed for Wheezing or  Shortness of Air. 360 mL 0    lactulose (CHRONULAC) 10 GM/15ML solution Take 30 mL by mouth 2 (Two) Times a Day As Needed.      levothyroxine (SYNTHROID, LEVOTHROID) 88 MCG tablet Take 1 tablet by mouth Daily.      ondansetron (ZOFRAN) 4 MG tablet Take 1 tablet by mouth Every 6 (Six) Hours As Needed for Nausea or Vomiting.      pantoprazole (PROTONIX) 40 MG EC tablet Take 1 tablet by mouth 2 (Two) Times a Day.      triamcinolone (KENALOG) 0.1 % cream Apply 1 Application topically to the appropriate area as directed Daily.      levETIRAcetam (KEPPRA) 500 MG tablet Take 1 tablet by mouth 2 (Two) Times a Day for 30 days. 60 tablet 0     No current facility-administered medications for this visit.            Assessment and Plan    Problem List Items Addressed This Visit       Hypertension (Chronic)    Overview     Goal of care: Maintain systolic blood pressure less than 140 and diastolic blood pressure less than 85.         Current Assessment & Plan     Hypertension is stable and controlled  Continue current treatment regimen.  Blood pressure will be reassessed in 6 months.         Late onset 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    Chronic HFrEF (heart failure with reduced ejection fraction) - Primary (Chronic)    Overview     Goal of care: Discussed with POA, goals are now comfort based rather than directed toward improving EF or following GDMT.  09/11/2020 - LVEF 36-40%, Grade 1 diastolic dysfunction, apical hypokinesis           Current Assessment & Plan     Will not repeat echocardiogram or add medications unless he becomes symptomatic.          Diagnoses and all orders for this visit:    1. Chronic HFrEF (heart failure with reduced ejection fraction) (Primary)  Assessment & Plan:  Will not repeat echocardiogram or add medications unless he becomes symptomatic.      2. Primary hypertension  Assessment & Plan:  Hypertension is stable and controlled  Continue current treatment regimen.  Blood pressure  will be reassessed in 6 months.      3. Late onset 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      I have assumed longitudinal care for complex medical condition(s) that require long-term management involving monitoring and adjustments to medications.  Goals of care, history of important events, and historical results can be found with each problem.  Episodic plan of care can be found in the order section.     I called the patient's power of -Vj Pastor-patient's son to discuss plan of care for CHF.  The last time an echocardiogram was evaluated was in 2020.  He is not on current GDMT.  However he does not show overt signs of CHF.  There is no edema or increased work of breathing.  After lengthy discussion of the risks versus benefits of adding GDMT, we reach a collaborative decision to not pursue additional medications due to his age and current activity level.  Should he develop symptoms of clinical CHF, we will reevaluate at that time and potentially change the plan of care.    Follow Up     No follow-ups on file.    Patient was given instructions and counseling regarding his condition or for health maintenance advice. Please see specific information pulled into the AVS if appropriate.       Patient or patient representative verbalized consent for the use of Ambient Listening during the visit with  THAO Shook for chart documentation. 10/30/2024  09:21 EDT     Electronically signed by THAO Shook, 10/30/24, 9:21 AM EDT.      Dictated Utilizing Dragon Dictation: Part of this note may be an electronic transcription/translation of spoken language to printed text using the Dragon Dictation System

## 2024-10-29 ENCOUNTER — LAB REQUISITION (OUTPATIENT)
Dept: LAB | Facility: HOSPITAL | Age: 89
End: 2024-10-29
Payer: MEDICARE

## 2024-10-29 DIAGNOSIS — E87.20 ACIDOSIS, UNSPECIFIED: ICD-10-CM

## 2024-10-29 LAB — D-LACTATE SERPL-SCNC: 2 MMOL/L (ref 0.5–2)

## 2024-10-29 PROCEDURE — 83605 ASSAY OF LACTIC ACID: CPT | Performed by: INTERNAL MEDICINE

## 2025-02-07 NOTE — OUTREACH NOTE
SNF Follow-up Note      Responses   Acute Facility Discharged From  [place]   Acute Discharge Date  11/13/19   Name of the Skilled Nursing Facility?  [facility]   Tier Level of the Skilled Nursing Facility  2   Purpose of SNF Admission  LTC, PT, OT   Estimated length of stay for the patient?  LTC   Who is the insurance provider or payor of patient stay?  Medicaid   Progression of Patient?  RN-TRUE spoke with Lien to check payor status,  patient has now switched to Medicaid for LTC   Where was the patient discharged to?  LTC          Gina Live RN  Ambulatory     1/30/2020, 11:55 AM   No

## 2025-03-05 ENCOUNTER — LAB REQUISITION (OUTPATIENT)
Dept: LAB | Facility: HOSPITAL | Age: OVER 89
End: 2025-03-05
Payer: MEDICARE

## 2025-03-05 DIAGNOSIS — R05.8 OTHER SPECIFIED COUGH: ICD-10-CM

## 2025-03-05 LAB
B PARAPERT DNA SPEC QL NAA+PROBE: NOT DETECTED
B PERT DNA SPEC QL NAA+PROBE: NOT DETECTED
C PNEUM DNA NPH QL NAA+NON-PROBE: NOT DETECTED
FLUAV SUBTYP SPEC NAA+PROBE: NOT DETECTED
FLUBV RNA ISLT QL NAA+PROBE: NOT DETECTED
HADV DNA SPEC NAA+PROBE: NOT DETECTED
HCOV 229E RNA SPEC QL NAA+PROBE: NOT DETECTED
HCOV HKU1 RNA SPEC QL NAA+PROBE: NOT DETECTED
HCOV NL63 RNA SPEC QL NAA+PROBE: NOT DETECTED
HCOV OC43 RNA SPEC QL NAA+PROBE: NOT DETECTED
HMPV RNA NPH QL NAA+NON-PROBE: NOT DETECTED
HPIV1 RNA ISLT QL NAA+PROBE: NOT DETECTED
HPIV2 RNA SPEC QL NAA+PROBE: NOT DETECTED
HPIV3 RNA NPH QL NAA+PROBE: NOT DETECTED
HPIV4 P GENE NPH QL NAA+PROBE: NOT DETECTED
M PNEUMO IGG SER IA-ACNC: NOT DETECTED
RHINOVIRUS RNA SPEC NAA+PROBE: NOT DETECTED
RSV RNA NPH QL NAA+NON-PROBE: NOT DETECTED
SARS-COV-2 RNA RESP QL NAA+PROBE: NOT DETECTED

## 2025-03-05 PROCEDURE — 0202U NFCT DS 22 TRGT SARS-COV-2: CPT | Performed by: NURSE PRACTITIONER

## 2025-05-30 ENCOUNTER — LAB REQUISITION (OUTPATIENT)
Dept: LAB | Facility: HOSPITAL | Age: OVER 89
End: 2025-05-30
Payer: MEDICARE

## 2025-05-30 DIAGNOSIS — R05.9 COUGH, UNSPECIFIED: ICD-10-CM

## 2025-05-30 LAB
B PARAPERT DNA SPEC QL NAA+PROBE: NOT DETECTED
B PERT DNA SPEC QL NAA+PROBE: NOT DETECTED
C PNEUM DNA NPH QL NAA+NON-PROBE: NOT DETECTED
FLUAV SUBTYP SPEC NAA+PROBE: NOT DETECTED
FLUBV RNA ISLT QL NAA+PROBE: NOT DETECTED
HADV DNA SPEC NAA+PROBE: NOT DETECTED
HCOV 229E RNA SPEC QL NAA+PROBE: NOT DETECTED
HCOV HKU1 RNA SPEC QL NAA+PROBE: NOT DETECTED
HCOV NL63 RNA SPEC QL NAA+PROBE: NOT DETECTED
HCOV OC43 RNA SPEC QL NAA+PROBE: NOT DETECTED
HMPV RNA NPH QL NAA+NON-PROBE: NOT DETECTED
HPIV1 RNA ISLT QL NAA+PROBE: NOT DETECTED
HPIV2 RNA SPEC QL NAA+PROBE: NOT DETECTED
HPIV3 RNA NPH QL NAA+PROBE: NOT DETECTED
HPIV4 P GENE NPH QL NAA+PROBE: NOT DETECTED
M PNEUMO IGG SER IA-ACNC: NOT DETECTED
RHINOVIRUS RNA SPEC NAA+PROBE: DETECTED
RSV RNA NPH QL NAA+NON-PROBE: NOT DETECTED
SARS-COV-2 RNA RESP QL NAA+PROBE: NOT DETECTED

## 2025-05-30 PROCEDURE — 0202U NFCT DS 22 TRGT SARS-COV-2: CPT | Performed by: NURSE PRACTITIONER

## (undated) DEVICE — PK PRSTH HIP 70

## (undated) DEVICE — SUT VIC 0 CP1 27IN J267H

## (undated) DEVICE — SYR LL TP 10ML STRL

## (undated) DEVICE — SUT VIC 2/0 TIES 18IN J111T

## (undated) DEVICE — PROXIMATE RH ROTATING HEAD SKIN STAPLERS (35 WIDE) CONTAINS 35 STAINLESS STEEL STAPLES: Brand: PROXIMATE

## (undated) DEVICE — SUT PDS 1 CT1 36IN Z347H

## (undated) DEVICE — SPNG GZ WOVN 4X4IN 12PLY 10/BX STRL

## (undated) DEVICE — DRSNG TELFA PAD NONADH STR 1S 3X8IN

## (undated) DEVICE — HOLDER: Brand: DEROYAL

## (undated) DEVICE — HANDPIECE SET WITH COAXIAL HIGH FLOW TIP AND SUCTION TUBE: Brand: INTERPULSE

## (undated) DEVICE — 2108 SERIES SAGITTAL BLADE (18.6 X 0.8 X 73.8MM)

## (undated) DEVICE — NDL MAYO CATGUT 1/2 CIR 18244D PK/2

## (undated) DEVICE — PILLW ABD MD

## (undated) DEVICE — GLV SURG SENSICARE PI ORTHO SZ7.5 LF STRL

## (undated) DEVICE — 3M™ IOBAN™ 2 ANTIMICROBIAL INCISE DRAPE 6650EZ: Brand: IOBAN™ 2

## (undated) DEVICE — PILLW ABD SM

## (undated) DEVICE — TOTAL TRAY, 16FR 10ML SIL FOLEY, URN: Brand: MEDLINE

## (undated) DEVICE — DRSNG PAD ABD 8X10IN STRL

## (undated) DEVICE — 4-PORT MANIFOLD: Brand: NEPTUNE 2

## (undated) DEVICE — BNDG ELAS CO-FLEX SLF ADHR 4IN5YD LF STRL